# Patient Record
Sex: FEMALE | Race: BLACK OR AFRICAN AMERICAN | NOT HISPANIC OR LATINO | Employment: UNEMPLOYED | URBAN - METROPOLITAN AREA
[De-identification: names, ages, dates, MRNs, and addresses within clinical notes are randomized per-mention and may not be internally consistent; named-entity substitution may affect disease eponyms.]

---

## 2023-09-11 ENCOUNTER — TELEPHONE (OUTPATIENT)
Dept: OBGYN CLINIC | Facility: CLINIC | Age: 23
End: 2023-09-11

## 2023-09-13 ENCOUNTER — OFFICE VISIT (OUTPATIENT)
Dept: OBGYN CLINIC | Facility: CLINIC | Age: 23
End: 2023-09-13

## 2023-09-13 VITALS
HEIGHT: 62 IN | WEIGHT: 137.4 LBS | BODY MASS INDEX: 25.28 KG/M2 | HEART RATE: 86 BPM | SYSTOLIC BLOOD PRESSURE: 116 MMHG | DIASTOLIC BLOOD PRESSURE: 75 MMHG

## 2023-09-13 DIAGNOSIS — R39.9 UTI SYMPTOMS: Primary | ICD-10-CM

## 2023-09-13 LAB
SL AMB  POCT GLUCOSE, UA: NORMAL
SL AMB LEUKOCYTE ESTERASE,UA: NORMAL
SL AMB POCT BILIRUBIN,UA: NORMAL
SL AMB POCT BLOOD,UA: NORMAL
SL AMB POCT CLARITY,UA: CLEAR
SL AMB POCT COLOR,UA: YELLOW
SL AMB POCT KETONES,UA: NORMAL
SL AMB POCT NITRITE,UA: NORMAL
SL AMB POCT PH,UA: 6
SL AMB POCT SPECIFIC GRAVITY,UA: 1.02
SL AMB POCT URINE PROTEIN: NORMAL
SL AMB POCT UROBILINOGEN: 0.2

## 2023-09-13 PROCEDURE — 81002 URINALYSIS NONAUTO W/O SCOPE: CPT | Performed by: NURSE PRACTITIONER

## 2023-09-13 PROCEDURE — 99213 OFFICE O/P EST LOW 20 MIN: CPT | Performed by: NURSE PRACTITIONER

## 2023-09-13 NOTE — PROGRESS NOTES
PROBLEM GYNECOLOGICAL VISIT    Johnny Eavns is a 23 y.o. female who presents today with complaint of UTI symptoms. Her general medical history has been reviewed and she reports it as follows:    Past Medical History:   Diagnosis Date   • Asthma    • Depression      Past Surgical History:   Procedure Laterality Date   • [place]  DELIVERY ONLY N/A 2021    Procedure:  SECTION (); Surgeon: Isaias Borja MD;  Location: [facility];  Service: Obstetrics     OB History        1    Para   1    Term   0       1    AB   0    Living   1       SAB   0    IAB   0    Ectopic   0    Multiple   0    Live Births   1               Social History     Tobacco Use   • Smoking status: Never   • Smokeless tobacco: Never   Vaping Use   • Vaping Use: Never used   Substance Use Topics   • Alcohol use: Not Currently   • Drug use: Yes     Types: Marijuana     Social History     Substance and Sexual Activity   Sexual Activity Yes   • Partners: Male   • Birth control/protection: I.U.D. No current outpatient medications    History of Present Illness:   Reports she was having symptoms of urinary frequency and discomfort with urination last week but symptoms seem to have resolved spontaneously over the past 2-3 days. Review of Systems:  Review of Systems   Constitutional: Negative. Gastrointestinal: Negative. Genitourinary: Negative. Physical Exam:  /75   Pulse 86   Ht 5' 2" (1.575 m)   Wt 62.3 kg (137 lb 6.4 oz)   LMP 2023 (Exact Date)   BMI 25.13 kg/m²   Physical Exam  Constitutional:       General: She is not in acute distress. Neurological:      Mental Status: She is alert. Skin:     General: Skin is warm and dry. Vitals reviewed. Point of Care Testing:   -urine dipstick: neg leuks, neg nitrites, neg blood    Assessment:   1. Normal urinalysis. Plan:   1. Return to office as previously scheduled.

## 2023-09-13 NOTE — PATIENT INSTRUCTIONS
Thank you for your confidence in our team.   We appreciate you and welcome your feedback. If you receive a survey from us, please take a few moments to let us know how we are doing.    Sincerely,  Barron Banerjee

## 2023-09-17 ENCOUNTER — HOSPITAL ENCOUNTER (EMERGENCY)
Facility: HOSPITAL | Age: 23
Discharge: HOME | End: 2023-09-17
Attending: EMERGENCY MEDICINE
Payer: COMMERCIAL

## 2023-09-17 VITALS
DIASTOLIC BLOOD PRESSURE: 55 MMHG | TEMPERATURE: 97.9 F | SYSTOLIC BLOOD PRESSURE: 111 MMHG | HEART RATE: 81 BPM | BODY MASS INDEX: 24.68 KG/M2 | OXYGEN SATURATION: 100 % | WEIGHT: 134.92 LBS | RESPIRATION RATE: 16 BRPM

## 2023-09-17 DIAGNOSIS — N39.0 UTI (URINARY TRACT INFECTION): Primary | ICD-10-CM

## 2023-09-17 LAB
BACTERIA UR QL AUTO: ABNORMAL /HPF
BILIRUB UR QL STRIP: NEGATIVE
CLARITY UR: CLEAR
COLOR UR: YELLOW
EXT PREGNANCY TEST URINE: NEGATIVE
EXT. CONTROL: NORMAL
GLUCOSE UR STRIP-MCNC: NEGATIVE MG/DL
HGB UR QL STRIP.AUTO: ABNORMAL
KETONES UR STRIP-MCNC: NEGATIVE MG/DL
LEUKOCYTE ESTERASE UR QL STRIP: ABNORMAL
NITRITE UR QL STRIP: NEGATIVE
NON-SQ EPI CELLS URNS QL MICRO: ABNORMAL /HPF
PH UR STRIP.AUTO: 5 [PH] (ref 4.5–8)
PROT UR STRIP-MCNC: ABNORMAL MG/DL
RBC #/AREA URNS AUTO: ABNORMAL /HPF
SP GR UR STRIP.AUTO: >=1.03 (ref 1–1.03)
TRANS CELLS #/AREA URNS HPF: PRESENT /[HPF]
UROBILINOGEN UR QL STRIP.AUTO: 0.2 E.U./DL
WBC #/AREA URNS AUTO: ABNORMAL /HPF

## 2023-09-17 PROCEDURE — 81001 URINALYSIS AUTO W/SCOPE: CPT

## 2023-09-17 PROCEDURE — 87086 URINE CULTURE/COLONY COUNT: CPT

## 2023-09-17 PROCEDURE — 87591 N.GONORRHOEAE DNA AMP PROB: CPT | Performed by: PHYSICIAN ASSISTANT

## 2023-09-17 PROCEDURE — 87491 CHLMYD TRACH DNA AMP PROBE: CPT | Performed by: PHYSICIAN ASSISTANT

## 2023-09-17 PROCEDURE — 81025 URINE PREGNANCY TEST: CPT | Performed by: PHYSICIAN ASSISTANT

## 2023-09-17 PROCEDURE — 99284 EMERGENCY DEPT VISIT MOD MDM: CPT | Performed by: PHYSICIAN ASSISTANT

## 2023-09-17 PROCEDURE — 99283 EMERGENCY DEPT VISIT LOW MDM: CPT

## 2023-09-17 PROCEDURE — 87480 CANDIDA DNA DIR PROBE: CPT | Performed by: PHYSICIAN ASSISTANT

## 2023-09-17 PROCEDURE — 87510 GARDNER VAG DNA DIR PROBE: CPT | Performed by: PHYSICIAN ASSISTANT

## 2023-09-17 PROCEDURE — 87660 TRICHOMONAS VAGIN DIR PROBE: CPT | Performed by: PHYSICIAN ASSISTANT

## 2023-09-17 RX ORDER — CEPHALEXIN 500 MG/1
500 CAPSULE ORAL EVERY 12 HOURS SCHEDULED
Qty: 14 CAPSULE | Refills: 0 | Status: SHIPPED | OUTPATIENT
Start: 2023-09-17 | End: 2023-09-24

## 2023-09-17 NOTE — ED PROVIDER NOTES
History  Chief Complaint   Patient presents with   • Possible UTI     Pt reporting increased frequency, burning and pressure. Pt reports symptoms began last evening. Hx UTI. Reports was tested for UTI earlier in month and was negative but symptoms have persisted intermittently      Patient is a 22 y/o female with a PMH of asthma and depression who presents for evaluation of urinary symptoms. She states that for the last 3 weeks she's been having intermittent urinary complaints of dysuria, urgency, frequent small amounts of urination. She states that she had 1-2 episodes of a small amount of hematuria. No current hematuria. She states she will have symptoms for 3-4 days and then they will improve for a short time and then come back again. She has some suprapubic pain as well. She tried OTC azo at one point which provided some relief for a short time. Had some spotting/pink discharge once during ovulation but no other abnormal vaginal discharge/bleeding. She states she was seen by her OBGYN last week (23) and they checked her urine but she didn't have a UTI. She states they did not perform a pelvic exam or have any STD testing done at that time. She states she believes she's having symptoms because she recently became sexually active again about 3-4 weeks ago. She states her  had been incarcerated for 10 months and then got out in Aug 2023. She states no new sexual partners. No hx of kidney stones or infections. Denies fever, chills, nausea, vomiting, diarrhea, CP, SOB, back pain. LMP 23. None       Past Medical History:   Diagnosis Date   • Asthma    • Depression        Past Surgical History:   Procedure Laterality Date   • [place]  DELIVERY ONLY N/A 2021    Procedure:  SECTION ();   Surgeon: José Luis Mcfarland MD;  [facility]: [facility];  Service: Obstetrics       Family History   Problem Relation Age of Onset   • Diabetes Mother    • Fibromyalgia Mother    • Hypertension Mother    • Anemia Mother    • Hypertension Father    • Asthma Sister    • Schizophrenia Brother    • Cancer Maternal Grandmother    • Breast cancer Neg Hx    • Colon cancer Neg Hx    • Ovarian cancer Neg Hx      I have reviewed and agree with the history as documented. E-Cigarette/Vaping   • E-Cigarette Use Never User      E-Cigarette/Vaping Substances   • THC  2020     Social History     Tobacco Use   • Smoking status: Never   • Smokeless tobacco: Never   Vaping Use   • Vaping Use: Never used   Substance Use Topics   • Alcohol use: Not Currently   • Drug use: Not Currently     Types: Marijuana       Review of Systems   Constitutional: Negative for chills and fever. HENT: Negative for ear pain and sore throat. Eyes: Negative for visual disturbance. Respiratory: Negative for cough and shortness of breath. Cardiovascular: Negative for chest pain and palpitations. Gastrointestinal: Positive for abdominal pain. Negative for diarrhea, nausea and vomiting. Genitourinary: Positive for dysuria, frequency, hematuria, urgency, vaginal bleeding and vaginal pain. Negative for decreased urine volume and flank pain. Musculoskeletal: Negative for arthralgias and back pain. Skin: Negative for color change and rash. Neurological: Negative for syncope, weakness, numbness and headaches. All other systems reviewed and are negative. Physical Exam  Physical Exam  Vitals and nursing note reviewed. Exam conducted with a chaperone present. Constitutional:       General: She is not in acute distress. Appearance: Normal appearance. She is well-developed. She is not ill-appearing, toxic-appearing or diaphoretic. HENT:      Head: Normocephalic and atraumatic. Right Ear: External ear normal.      Left Ear: External ear normal.      Nose: Nose normal.   Eyes:      Conjunctiva/sclera: Conjunctivae normal.   Cardiovascular:      Rate and Rhythm: Normal rate and regular rhythm.       Heart sounds: Normal heart sounds. No murmur heard. Pulmonary:      Effort: Pulmonary effort is normal. No respiratory distress. Breath sounds: Normal breath sounds. No stridor. No wheezing, rhonchi or rales. Abdominal:      General: Abdomen is flat. Bowel sounds are normal. There is no distension. Palpations: Abdomen is soft. Tenderness: There is abdominal tenderness in the suprapubic area. There is no right CVA tenderness, left CVA tenderness or guarding. Genitourinary:     Comments: External genitalia normal without rash or lesions. Speculum exam- cervix visualized without lesions or friability. No significant vaginal discharge. No bleeding. No CMT   Musculoskeletal:         General: No swelling. Normal range of motion. Cervical back: Normal range of motion. Skin:     General: Skin is warm and dry. Capillary Refill: Capillary refill takes less than 2 seconds. Neurological:      Mental Status: She is alert. Psychiatric:         Mood and Affect: Mood normal.         Vital Signs  ED Triage Vitals [09/17/23 1509]   Temperature Pulse Respirations Blood Pressure SpO2   97.9 °F (36.6 °C) 81 16 111/55 100 %      Temp Source Heart Rate Source Patient Position - Orthostatic VS BP Location FiO2 (%)   Oral Monitor Sitting Right arm --      Pain Score       --           Vitals:    09/17/23 1509   BP: 111/55   Pulse: 81   Patient Position - Orthostatic VS: Sitting         Visual Acuity      ED Medications  Medications - No data to display    Diagnostic Studies  Results Reviewed     Procedure Component Value Units Date/Time    VAGINOSIS DNA PROBE (AFFIRM) [[id number]] Collected: 09/17/23 1541    Lab Status:  In process Specimen: Genital from Vaginal Updated: 09/17/23 1600    Urine Microscopic [[id number]]  (Abnormal) Collected: 09/17/23 1524    Lab Status: Final result Specimen: Urine, Clean Catch Updated: 09/17/23 1553     RBC, UA 20-30 /hpf      WBC, UA Innumerable /hpf      Epithelial Cells Occasional /hpf      Bacteria, UA Occasional /hpf      Transitional Epithelial Cells Present    Urine culture [[id number]] Collected: 09/17/23 1524    Lab Status: In process Specimen: Urine, Clean Catch Updated: 09/17/23 1553    Chlamydia/GC amplified DNA by PCR [[id number]] Collected: 09/17/23 1541    Lab Status: In process Specimen: Cervix Updated: 09/17/23 1548    POCT pregnancy, urine [[id number]]  (Normal) Resulted: 09/17/23 1526    Lab Status: Final result Updated: 09/17/23 1526     EXT Preg Test, Ur Negative     Control Valid    Urine Macroscopic, POC [[id number]]  (Abnormal) Collected: 09/17/23 1524    Lab Status: Final result Specimen: Urine Updated: 09/17/23 1525     Color, UA Yellow     Clarity, UA Clear     pH, UA 5.0     Leukocytes, UA Small     Nitrite, UA Negative     Protein, UA Trace mg/dl      Glucose, UA Negative mg/dl      Ketones, UA Negative mg/dl      Urobilinogen, UA 0.2 E.U./dl      Bilirubin, UA Negative     Occult Blood, UA Trace     Specific Gravity, UA >=1.030    Narrative:      CLINITEK RESULT                 No orders to display              Procedures  Procedures         ED Course         CRAFFT    Flowsheet Row Most Recent Value   CRAFFT Initial Screen: During the past 12 months, did you:    1. Drink any alcohol (more than a few sips)? No Filed at: 09/17/2023 1527   2. Smoke any marijuana or hashish No Filed at: 09/17/2023 1527   3. Use anything else to get high? ("anything else" includes illegal drugs, over the counter and prescription drugs, and things that you sniff or 'gifford')? No Filed at: 09/17/2023 1527                                          Medical Decision Making  Differential diagnosis including but not limited to: UTI, urethritis, STD, vulvovaginal candidiasis, urinary retention; doubt acute intraabdominal surgical process. Plan- will send urine preg, UA, GC/Chlamydia, and vaginosis probe  Urine preg negative. PVR only 14. No sign of retention.  No flank or CVA tenderness, no fever/chills, nausea, vomiting to indicate kidney stone or pyelonephritis. Pelvic exam unremarkable- Explained that STD testing (GC/chlamydia and vaginosis probe) will take 3-4 days and she will be contacted with positive results. If positive results, will need further treatment. UA with leukocytes, blood and protein. Micro with innumerable WBC, 20-30 rbc. Consistent with UTI. Will treat for UTI with Keflex. Discussed follow up with PCP and OBGYN in 1-2 days. Will also give contact information for urology to set up outpatient appointment if needed. Discussed strict return precautions if symptoms worsen or new symptoms arise. Patient states understanding and agrees with plan. She is well appearing, non toxic and in NAD at time of discharge. UTI (urinary tract infection): acute illness or injury  Amount and/or Complexity of Data Reviewed  Labs: ordered. Decision-making details documented in ED Course. Risk  Prescription drug management. Disposition  Final diagnoses:   UTI (urinary tract infection)     Time reflects when diagnosis was documented in both MDM as applicable and the Disposition within this note     Time User Action Codes Description Comment    9/17/2023  4:43 PM John Reyes Add [N39.0] UTI (urinary tract infection)       ED Disposition     ED Disposition   Discharge    Condition   Stable    Date/Time   Sun Sep 17, 2023  4:43 PM    Comment   Svetlana Bloodgood discharge to home/self care.                Follow-up Information     Follow up With Specialties Details Why Contact Info Additional Information    [facility] Schedule an appointment as soon as possible for a visit in 1 day  Binh Figueroa  [address]  [phone]       Follow up with your OBGYN in 1-2 days         [facility] Schedule an appointment as soon as possible for a visit  As needed Summer Hernandez [address] [place] [phone] [address] [facility], [address] [facility], [place], [place], [address]   [address] [facility] Emergency [facility] Emergency Medicine  If symptoms worsen [address] [phone]  [address] [facility] Emergency Department, [address] [facility], [place], [place], [address]          Discharge Medication List as of 9/17/2023  4:43 PM      START taking these medications    Details   cephalexin (KEFLEX) 500 mg capsule Take 1 capsule (500 mg total) by mouth every 12 (twelve) hours for 7 days, Starting Sun 9/17/2023, Until Sun 9/24/2023, Print             No discharge procedures on file.     PDMP Review     None          ED Provider  Electronically Signed by           Pooja Alvarez PA-C  09/17/23 2946

## 2023-09-18 LAB
C TRACH DNA SPEC QL NAA+PROBE: NEGATIVE
N GONORRHOEA DNA SPEC QL NAA+PROBE: NEGATIVE

## 2023-09-19 LAB — BACTERIA UR CULT: NORMAL

## 2023-09-20 LAB
CANDIDA RRNA VAG QL PROBE: NEGATIVE
G VAGINALIS RRNA GENITAL QL PROBE: NEGATIVE
T VAGINALIS RRNA GENITAL QL PROBE: NEGATIVE

## 2023-10-11 ENCOUNTER — TELEPHONE (OUTPATIENT)
Dept: FAMILY MEDICINE CLINIC | Facility: CLINIC | Age: 23
End: 2023-10-11

## 2023-10-11 NOTE — TELEPHONE ENCOUNTER
Spoke with pt who confirmed that she is no longer a pt at this practice. Pt stated that she moved out of the area and recently moved back and she has not yet met her new PCP.

## 2023-11-02 ENCOUNTER — TELEPHONE (OUTPATIENT)
Dept: PSYCHIATRY | Facility: CLINIC | Age: 23
End: 2023-11-02

## 2023-11-02 NOTE — TELEPHONE ENCOUNTER
Behavioral Health Outpatient Intake Questions    Referred By   : Self     Please advise interviewee that they need to answer all questions truthfully to allow for best care, and any misrepresentations of information may affect their ability to be seen at this clinic   => Was this discussed? Yes     If Minor Child (under age 25)    Who is/are the legal guardian(s) of the child? Is there a custody agreement? No     If "YES"- Custody orders must be obtained prior to scheduling the first appointment  In addition, Consent to Treatment must be signed by all legal guardians prior to scheduling the first appointment    If "NO"- Consent to Treatment must be signed by all legal guardians prior to scheduling the first appointment    Behavioral Health Outpatient Intake History -     Presenting Problem (in patient's own words): Having marital problems looking to get coping skills, trying to prevent marriage to fall. Are there any communication barriers for this patient? No                                               If yes, please describe barriers:   If there is a unique situation, please refer to [address] for final determination. Are you taking any psychiatric medications? No     If "YES" -What are they     If "YES" -Who prescribes? Has the Patient previously received outpatient Talk Therapy or Medication Management from [facility]  No        If "YES"- When, Where and with Whom? Rubina 11/9/23 at 2:00 pm        If "NO" -Has Patient received these services elsewhere? If "YES" -When, Where, and with Whom? Has the Patient abused alcohol or other substances in the last 6 months ? No  No concerns of substance abuse are reported. If "YES" -What substance, How much, How often? If illegal substance: Refer to [facility] (for [facility]) or [facility].    If Alcohol in excess of 10 drinks per week:  Refer to [facility] (for [facility]) or [facility] History-     Is this treatment court ordered? No   If "yes "send to : Talk Therapy : Send to [address] [place] [address] for final determination   Med Management: Send to Dr Kandy Howard for final determination     Has the Patient been convicted of a felony? No   If "Yes" send to -When, What? Talk Therapy : Send to [address] [place] [address] for final determination   Med Management: Send to Dr Kandy Howard for final determination     ACCEPTED as a patient No  If "Yes" Appointment Date:     Referred Elsewhere? No  If “Yes” - (Where? Ex: [facility], [facility], [address] [place] [address], etc.)       Name of [address] [place] [address] ID# [id number]  Insurance Phone #  If ins is primary or secondary? Primary  If patient is a minor, parents information such as Name, D. O.B of guarantor.

## 2023-11-09 ENCOUNTER — SOCIAL WORK (OUTPATIENT)
Dept: BEHAVIORAL/MENTAL HEALTH CLINIC | Facility: CLINIC | Age: 23
End: 2023-11-09
Payer: COMMERCIAL

## 2023-11-09 DIAGNOSIS — F33.40 RECURRENT MAJOR DEPRESSIVE DISORDER, IN REMISSION (HCC): ICD-10-CM

## 2023-11-09 DIAGNOSIS — F93.8: Primary | ICD-10-CM

## 2023-11-09 PROCEDURE — 90791 PSYCH DIAGNOSTIC EVALUATION: CPT | Performed by: COUNSELOR
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
## 2023-11-21 ENCOUNTER — SOCIAL WORK (OUTPATIENT)
Dept: BEHAVIORAL/MENTAL HEALTH CLINIC | Facility: CLINIC | Age: 23
End: 2023-11-21
Payer: COMMERCIAL

## 2023-11-21 DIAGNOSIS — F33.40 RECURRENT MAJOR DEPRESSIVE DISORDER, IN REMISSION (HCC): ICD-10-CM

## 2023-11-21 DIAGNOSIS — F93.8: Primary | ICD-10-CM

## 2023-11-21 PROCEDURE — 90847 FAMILY PSYTX W/PT 50 MIN: CPT | Performed by: COUNSELOR

## 2023-11-21 NOTE — BH TREATMENT PLAN
Outpatient Behavioral Health Psychotherapy Treatment Plan    Jigna Nogueira  2000     Date of Initial Psychotherapy Assessment: 11/21/23   Date of Current Treatment Plan: 11/21/23  Treatment Plan Target Date: 05/21/24  Treatment Plan Expiration Date: TBD    Diagnosis:   1. Relationship problems specific to childhood and adolescence        2. Recurrent major depressive disorder, in remission ([address] )              Area(s) of Need: They report to experience misunderstandings, lack of respect and difficulties with communication. Long Term Goal 1 (in the client's own words): Justine Ford state, "We want to be better individually and have a good marriage."    Stage of Change: Preparation    Target Date for completion: 05/21/24     Anticipated therapeutic modalities: Engagement Strategies, Client-centered Therapy, Cognitive Behavioral Therapy, Cognitive Processing Therapy, Family Therapy, Mindfulness-based Strategies, Motivational Interviewing, Music Therapy Techniques, Solution-Focused Therapy, Supportive Psychotherapy, Psycho-Education, and Couples Counseling. People identified to complete this goal: Jigna Nogueira, Kristina Johnson and Azeem Leavitt, MS, LPC, NCC      Objective 1: (identify the means of measuring success in meeting the objective): Explore relationship issues and identifying origins      Objective 2: (identify the means of measuring success in meeting the objective): Be able to express anger without yelling and using foul language    Objective 3: (identify the means of measuring success in meeting the objective): Learning and applying active listening skill       We am currently under the care of a [facility] psychiatric provider: no    My [facility] psychiatric provider is: N/A    We am currently taking psychiatric medications: No    we feel that we will be ready for discharge from mental health care when we reach the following (measurable goal/objective):  Aristeo Molina states, "When we are taking action to our goals. "    For children and adults who have a legal guardian:   Has there been any change to custody orders and/or guardianship status? NA. If yes, attach updated documentation. I have created my Crisis Plan and have been offered a copy of this plan    [address]: Diagnosis and Treatment Plan explained to Rui Olmedo acknowledges an understanding of their diagnosis. Trujillo Flavia agrees to this treatment plan.     I have been offered a copy of this Treatment Plan. yes

## 2023-11-21 NOTE — PSYCH
Behavioral Health Psychotherapy Progress Note    Psychotherapy Provided: Individual Psychotherapy     1. Relationship problems specific to childhood and adolescence        2. Recurrent major depressive disorder, in remission [facility]            Goals addressed in session: Goal 1     DATA: Nicole Oliveira and Bethel reports to have been experiencing intense arguments since we last met. Clinician and the couple identified their goals and objectives and created their treatment plan. We discussed time frames, as well as the purpose for the treatment plan. We were also able to identify areas in which they've felt their relationship needed work in. They were able to identify the lack of communication, Nicole Oliveira reports that they as a couple are able to verbalize their emotions when needed, Criselda Montano was able to verbalize however, the need to be able to listen in order to  better understand each other. We identified active listening as a goal.    During this session, this clinician used the following therapeutic modalities: Engagement Strategies, Client-centered Therapy, Cognitive Behavioral Therapy, Cognitive Processing Therapy, Family Therapy, Mindfulness-based Strategies, Motivational Interviewing, Music Therapy Techniques, Solution-Focused Therapy, Supportive Psychotherapy, and Couples Counseling. Substance Abuse was not addressed during this session. If the client is diagnosed with a co-occurring substance use disorder, please indicate any changes in the frequency or amount of use: Nicole Oliveira reports to smoke cannabis on occasion. Bethel denies substance abuse. Stage of change for addressing substance use diagnoses: Contemplation    ASSESSMENT:  Karen Guerin presents with a Euthymic/ normal mood. her affect is Normal range and intensity, which is congruent, with her mood and the content of the session. The client has made progress on their goals.     Jerome Gordon denies suicidal/homicidal ideations or self-injurious behaviors. Jorge Barbosa presents with a none risk of suicide, none risk of self-harm, and none risk of harm to others. For any risk assessment that surpasses a "low" rating, a safety plan must be developed. A safety plan was indicated: no  If yes, describe in detail safety plan not indicated. PLAN: Between sessions, Jorge Barbosa will review and discuss treatment plan. At the next session, the therapist will use Engagement Strategies, Client-centered Therapy, Cognitive Behavioral Therapy, Cognitive Processing Therapy, Mindfulness-based Strategies, Motivational Interviewing, Solution-Focused Therapy, and Supportive Psychotherapy to address their difficulties with communicating. Behavioral Health Treatment Plan and Discharge Planning: Jorge Barbosa is aware of and agrees to continue to work on their treatment plan. They have identified and are working toward their discharge goals.  yes    Visit start and stop times:    11/21/23  Start Time: 1400  Stop Time: 1453  Total Visit Time: 53 minutes

## 2023-12-06 ENCOUNTER — SOCIAL WORK (OUTPATIENT)
Dept: BEHAVIORAL/MENTAL HEALTH CLINIC | Facility: CLINIC | Age: 23
End: 2023-12-06
Payer: COMMERCIAL

## 2023-12-06 DIAGNOSIS — F33.40 RECURRENT MAJOR DEPRESSIVE DISORDER, IN REMISSION (HCC): ICD-10-CM

## 2023-12-06 DIAGNOSIS — F93.8: Primary | ICD-10-CM

## 2023-12-06 PROCEDURE — 90834 PSYTX W PT 45 MINUTES: CPT | Performed by: COUNSELOR

## 2023-12-06 NOTE — PSYCH
Behavioral Health Psychotherapy Progress Note    Psychotherapy Provided: Individual Psychotherapy     1. Relationship problems specific to childhood and adolescence        2. Recurrent major depressive disorder, in remission [facility]            Goals addressed in session: Goal 1     DATA: Today clinician met with Carin Stockton on her own. Clinician took this moment to have an individual session to further discuss the difficulties she's had within the relationship. Carin Stockton shared she and her partner had just gotten into an argument and he didn't want to attend the session. Clinician discussed how this recent interaction could have been a great opportunity to further discussed and identify their barriers, as per perspective. We discussed communication skills; as she reports they lack empathy for each other at times. She shares feeling unheard and misunderstood. Carin Stockton indicates of feeling numb, due to how much her partner feels he needs to win the argument without understanding her. We explored pointers that will help with improving her communication with her partner. We discussed utilizing "I" statements, utilizing respect, and learning when to approach. Clinician and Carin Stockton also completed her Crisis Plan. During this session, this clinician used the following therapeutic modalities: Engagement Strategies, Mindfulness-based Strategies, Motivational Interviewing, Solution-Focused Therapy, and Supportive Psychotherapy    Substance Abuse was not addressed during this session. If the client is diagnosed with a co-occurring substance use disorder, please indicate any changes in the frequency or amount of use: Carin Stockton denies substance abuse. Stage of change for addressing substance use diagnoses: No substance use/Not applicable    ASSESSMENT:  Yesy Clark presents with a Euthymic/ normal mood. her affect is Normal range and intensity, which is congruent, with her mood and the content of the session.  The client has not made progress on their goals. Nicole Oliveira denies suicidal/homicidal ideations or self-injurious behaviors. Karen Guerin presents with a none risk of suicide, none risk of self-harm, and none risk of harm to others. For any risk assessment that surpasses a "low" rating, a safety plan must be developed. A safety plan was indicated: no  If yes, describe in detail safety plan not indicated. PLAN: Between sessions, Karen Guerin will review the "Soft Start" communication skills handout. At the next session, the therapist will use Engagement Strategies, Client-centered Therapy, Cognitive Behavioral Therapy, Cognitive Processing Therapy, Mindfulness-based Strategies, Motivational Interviewing, Solution-Focused Therapy, Supportive Psychotherapy, and couples counseling. to address marital issues. Behavioral Health Treatment Plan and Discharge Planning: Karen Guerin is aware of and agrees to continue to work on their treatment plan. They have identified and are working toward their discharge goals.  yes    Visit start and stop times:    12/06/23  Start Time: 1400  Stop Time: 1452  Total Visit Time: 52 minutes

## 2023-12-06 NOTE — BH CRISIS PLAN
Client Name: Vani Pro       Client YOB: 2000  : 2000    Treatment Team (include name and contact information):     Psychotherapist: Reginaldo Branham MS, Eliezer PANDEY    Psychiatrist: N/A   Release of information completed: no    : N/A   Release of information completed: no    Other (Specify Role): N/A    Release of information completed: no    Other (Specify Role): N/A   Release of information completed: no    Healthcare Provider  No primary care provider on file. No primary provider on file. Type of Plan   * Child plans (children 15 yo and younger) must be completed and signed by the child's legal guardian   * Plans for all individuals 15 yo and above must be signed by the client. Plan Type: adolescent/adult (14 and over) Initial      My Personal Strengths are (in the client's own words): Rebecca Balbuena states, "I am very caring and I try to understands others perspectives; even when I'm wrong. I am a problem solver, I will always try to find a solution. I am a hard worker. "    The stressors and triggers that may put me at risk are:  My  can be a stressor at time, family conflict, family issues, family problems, financial problems, job stress, relationship problems, and stress at work    Coping skills I can use to keep myself calm and safe: Take a shower, Listen to music, Call a friend or family member, Rena Stalling and [place]/meditate    Coping skills/supports I can use to maintain abstinence from substance use:   Not Applicable    The people that provide me with help and support: (Include name, contact, and how they can help)   Support person #1:Jasmeet - Dad    * Phone number: in cell phone    * How can they help me? To talk about random things    Support person #2: Tali Amaya - Mother-in-Law    * Phone number: in cell phone    * How can they help me? Just talk about things at times.      Support person #3: Mila Morton - Sister in [facility]    * Phone number: in cell phone    * How can they help me? She's a great distraction to talk about other things in life. In the past, the following has helped me in times of crisis:    Being in a quiet space, Being with other people, Calling a friend, Calling a family member, Praying or meditating, Listening to music, Watching television or a movie, and Other: cooking      If it is an emergency and you need immediate help, call     If there is a possibility of danger to yourself or others, call the following crisis hotline resources:     Adult Crisis Numbers  Suicide Prevention Hotline - Dial   [facility]: [address]: [address]: [address]: [address]: [address]: [address]: [address]: [phone] (daytime). [phone] (after hours, weekends, holidays)     Child/Adolescent Crisis Numbers   [facility]: [address]: [phone]   [facility]: [phone]   [facility]: [phone]    Please note: Some [facility] do not have a separate number for Child/Adolescent specific crisis. If your county is not listed under Child/Adolescent, please call the adult number for your county     National Talk to Text Line   All Ages - [phone]    In the event your feelings become unmanageable, and you cannot reach your support system, you will call 911 immediately or go to the nearest hospital emergency room.

## 2023-12-28 ENCOUNTER — SOCIAL WORK (OUTPATIENT)
Dept: BEHAVIORAL/MENTAL HEALTH CLINIC | Facility: CLINIC | Age: 23
End: 2023-12-28
Payer: COMMERCIAL

## 2023-12-28 DIAGNOSIS — F33.40 RECURRENT MAJOR DEPRESSIVE DISORDER, IN REMISSION (HCC): ICD-10-CM

## 2023-12-28 DIAGNOSIS — F93.8: Primary | ICD-10-CM

## 2023-12-28 PROCEDURE — 90834 PSYTX W PT 45 MINUTES: CPT | Performed by: COUNSELOR

## 2023-12-28 NOTE — PSYCH
Behavioral Health Psychotherapy Progress Note    Psychotherapy Provided: Individual Psychotherapy     1. Relationship problems specific to childhood and adolescence        2. Recurrent major depressive disorder, in remission ([facility])            Goals addressed in session: Goal 1     DATA: Today we discussed how Xavier have been experiencing a cycle of arguments. Clinician provided them with time and space to verbalize how they've been going through their cycle. Clinician validated both sides, and highlighted possible alternative to their perspectives. Clinician explored and identified certain barriers they've had in their engagement, and discussed two  forms in which each can invest in their marriage. We explored what love was after they quoted the bible. Clinician read the verse that corresponded to what they mentioned and we further explored what that would look like within their marriage.   During this session, this clinician used the following therapeutic modalities: Engagement Strategies, Client-centered Therapy, Cognitive Behavioral Therapy, Cognitive Processing Therapy, Mindfulness-based Strategies, Motivational Interviewing, Solution-Focused Therapy, Supportive Psychotherapy, and Biblical Counseling    Substance Abuse was not addressed during this session. If the client is diagnosed with a co-occurring substance use disorder, please indicate any changes in the frequency or amount of use: Kamilah denies substance abuse. Stage of change for addressing substance use diagnoses: No substance use/Not applicable     ASSESSMENT:  Kamilah Ortega presents with a Euthymic/ normal mood.      her affect is Normal range and intensity, which is congruent, with her mood and the content of the session. The client has not made progress on their goals.     Kamilah denies suicidal/homicidal ideations or self-injurious behaviors.  Kamilah Ortega presents with a none risk of suicide, none risk of self-harm, and none  "risk of harm to others.     For any risk assessment that surpasses a \"low\" rating, a safety plan must be developed.     A safety plan was indicated: no  If yes, describe in detail safety plan not indicated.    PLAN: Between sessions, Kamilah Ortega and Bethel Ortega will search for what the bible says about marriage. At the next session, the therapist will use Engagement Strategies, Client-centered Therapy, Cognitive Behavioral Therapy, Cognitive Processing Therapy, Mindfulness-based Strategies, Motivational Interviewing, Solution-Focused Therapy, Supportive Psychotherapy, and Biblical Counseling  to address their anger.    Behavioral Health Treatment Plan and Discharge Planning: Kamilah Jordan is aware of and agrees to continue to work on their treatment plan. They have identified and are working toward their discharge goals. yes    Visit start and stop times:    12/28/23  Start Time: 1401  Stop Time: 1453  Total Visit Time: 52 minutes  "

## 2024-01-02 ENCOUNTER — HOSPITAL ENCOUNTER (EMERGENCY)
Facility: HOSPITAL | Age: 24
Discharge: HOME | End: 2024-01-02
Attending: EMERGENCY MEDICINE
Payer: COMMERCIAL

## 2024-01-02 ENCOUNTER — APPOINTMENT (EMERGENCY)
Dept: ULTRASOUND IMAGING | Facility: HOSPITAL | Age: 24
End: 2024-01-02
Payer: COMMERCIAL

## 2024-01-02 VITALS
WEIGHT: 133.16 LBS | BODY MASS INDEX: 24.35 KG/M2 | OXYGEN SATURATION: 100 % | RESPIRATION RATE: 16 BRPM | DIASTOLIC BLOOD PRESSURE: 55 MMHG | SYSTOLIC BLOOD PRESSURE: 127 MMHG | HEART RATE: 88 BPM | TEMPERATURE: 97.5 F

## 2024-01-02 DIAGNOSIS — S16.1XXA NECK STRAIN, INITIAL ENCOUNTER: ICD-10-CM

## 2024-01-02 DIAGNOSIS — O21.9 NAUSEA AND VOMITING DURING PREGNANCY: ICD-10-CM

## 2024-01-02 DIAGNOSIS — Z3A.01 5 WEEKS GESTATION OF PREGNANCY: Primary | ICD-10-CM

## 2024-01-02 LAB
ABO GROUP BLD: NORMAL
ALBUMIN SERPL BCP-MCNC: 4.1 G/DL (ref 3.5–5)
ALP SERPL-CCNC: 35 U/L (ref 34–104)
ALT SERPL W P-5'-P-CCNC: 11 U/L (ref 7–52)
ANION GAP SERPL CALCULATED.3IONS-SCNC: 4 MMOL/L
AST SERPL W P-5'-P-CCNC: 13 U/L (ref 13–39)
B-HCG SERPL-ACNC: ABNORMAL MIU/ML (ref 0–5)
BACTERIA UR QL AUTO: ABNORMAL /HPF
BASOPHILS # BLD AUTO: 0.02 THOUSANDS/ÂΜL (ref 0–0.1)
BASOPHILS NFR BLD AUTO: 0 % (ref 0–1)
BILIRUB SERPL-MCNC: 1.21 MG/DL (ref 0.2–1)
BILIRUB UR QL STRIP: NEGATIVE
BUN SERPL-MCNC: 11 MG/DL (ref 5–25)
CALCIUM SERPL-MCNC: 9 MG/DL (ref 8.4–10.2)
CHLORIDE SERPL-SCNC: 105 MMOL/L (ref 96–108)
CLARITY UR: CLEAR
CO2 SERPL-SCNC: 24 MMOL/L (ref 21–32)
COLOR UR: YELLOW
CREAT SERPL-MCNC: 0.69 MG/DL (ref 0.6–1.3)
EOSINOPHIL # BLD AUTO: 0.32 THOUSAND/ÂΜL (ref 0–0.61)
EOSINOPHIL NFR BLD AUTO: 7 % (ref 0–6)
ERYTHROCYTE [DISTWIDTH] IN BLOOD BY AUTOMATED COUNT: 13.1 % (ref 11.6–15.1)
EXT PREGNANCY TEST URINE: POSITIVE
EXT. CONTROL: ABNORMAL
FLUAV RNA RESP QL NAA+PROBE: NEGATIVE
FLUBV RNA RESP QL NAA+PROBE: NEGATIVE
GFR SERPL CREATININE-BSD FRML MDRD: 124 ML/MIN/1.73SQ M
GLUCOSE SERPL-MCNC: 78 MG/DL (ref 65–140)
GLUCOSE UR STRIP-MCNC: NEGATIVE MG/DL
HCT VFR BLD AUTO: 35.8 % (ref 34.8–46.1)
HGB BLD-MCNC: 11.4 G/DL (ref 11.5–15.4)
HGB UR QL STRIP.AUTO: NEGATIVE
IMM GRANULOCYTES # BLD AUTO: 0.01 THOUSAND/UL (ref 0–0.2)
IMM GRANULOCYTES NFR BLD AUTO: 0 % (ref 0–2)
KETONES UR STRIP-MCNC: NEGATIVE MG/DL
LEUKOCYTE ESTERASE UR QL STRIP: NEGATIVE
LYMPHOCYTES # BLD AUTO: 1.71 THOUSANDS/ÂΜL (ref 0.6–4.47)
LYMPHOCYTES NFR BLD AUTO: 35 % (ref 14–44)
MCH RBC QN AUTO: 27.3 PG (ref 26.8–34.3)
MCHC RBC AUTO-ENTMCNC: 31.8 G/DL (ref 31.4–37.4)
MCV RBC AUTO: 86 FL (ref 82–98)
MONOCYTES # BLD AUTO: 0.4 THOUSAND/ÂΜL (ref 0.17–1.22)
MONOCYTES NFR BLD AUTO: 8 % (ref 4–12)
MUCOUS THREADS UR QL AUTO: ABNORMAL
NEUTROPHILS # BLD AUTO: 2.47 THOUSANDS/ÂΜL (ref 1.85–7.62)
NEUTS SEG NFR BLD AUTO: 50 % (ref 43–75)
NITRITE UR QL STRIP: NEGATIVE
NON-SQ EPI CELLS URNS QL MICRO: ABNORMAL /HPF
NRBC BLD AUTO-RTO: 0 /100 WBCS
PH UR STRIP.AUTO: 7.5 [PH] (ref 4.5–8)
PLATELET # BLD AUTO: 167 THOUSANDS/UL (ref 149–390)
PMV BLD AUTO: 11.1 FL (ref 8.9–12.7)
POTASSIUM SERPL-SCNC: 3.7 MMOL/L (ref 3.5–5.3)
PROT SERPL-MCNC: 7 G/DL (ref 6.4–8.4)
PROT UR STRIP-MCNC: ABNORMAL MG/DL
RBC # BLD AUTO: 4.17 MILLION/UL (ref 3.81–5.12)
RBC #/AREA URNS AUTO: ABNORMAL /HPF
RH BLD: POSITIVE
RSV RNA RESP QL NAA+PROBE: NEGATIVE
SARS-COV-2 RNA RESP QL NAA+PROBE: NEGATIVE
SODIUM SERPL-SCNC: 133 MMOL/L (ref 135–147)
SP GR UR STRIP.AUTO: 1.02 (ref 1–1.03)
UROBILINOGEN UR QL STRIP.AUTO: 0.2 E.U./DL
WBC # BLD AUTO: 4.93 THOUSAND/UL (ref 4.31–10.16)
WBC #/AREA URNS AUTO: ABNORMAL /HPF

## 2024-01-02 PROCEDURE — 85025 COMPLETE CBC W/AUTO DIFF WBC: CPT | Performed by: EMERGENCY MEDICINE

## 2024-01-02 PROCEDURE — 96361 HYDRATE IV INFUSION ADD-ON: CPT

## 2024-01-02 PROCEDURE — 0241U HB NFCT DS VIR RESP RNA 4 TRGT: CPT

## 2024-01-02 PROCEDURE — 84702 CHORIONIC GONADOTROPIN TEST: CPT | Performed by: EMERGENCY MEDICINE

## 2024-01-02 PROCEDURE — 81001 URINALYSIS AUTO W/SCOPE: CPT

## 2024-01-02 PROCEDURE — 81025 URINE PREGNANCY TEST: CPT | Performed by: EMERGENCY MEDICINE

## 2024-01-02 PROCEDURE — 99284 EMERGENCY DEPT VISIT MOD MDM: CPT

## 2024-01-02 PROCEDURE — 86901 BLOOD TYPING SEROLOGIC RH(D): CPT | Performed by: EMERGENCY MEDICINE

## 2024-01-02 PROCEDURE — 36415 COLL VENOUS BLD VENIPUNCTURE: CPT

## 2024-01-02 PROCEDURE — 76801 OB US < 14 WKS SINGLE FETUS: CPT

## 2024-01-02 PROCEDURE — 99285 EMERGENCY DEPT VISIT HI MDM: CPT | Performed by: EMERGENCY MEDICINE

## 2024-01-02 PROCEDURE — 96374 THER/PROPH/DIAG INJ IV PUSH: CPT

## 2024-01-02 PROCEDURE — 80053 COMPREHEN METABOLIC PANEL: CPT | Performed by: EMERGENCY MEDICINE

## 2024-01-02 PROCEDURE — 86900 BLOOD TYPING SEROLOGIC ABO: CPT | Performed by: EMERGENCY MEDICINE

## 2024-01-02 RX ORDER — ONDANSETRON 4 MG/1
4 TABLET, FILM COATED ORAL EVERY 6 HOURS
Qty: 60 TABLET | Refills: 0 | Status: SHIPPED | OUTPATIENT
Start: 2024-01-02 | End: 2024-01-15

## 2024-01-02 RX ORDER — ONDANSETRON 2 MG/ML
4 INJECTION INTRAMUSCULAR; INTRAVENOUS ONCE
Status: COMPLETED | OUTPATIENT
Start: 2024-01-02 | End: 2024-01-02

## 2024-01-02 RX ORDER — LIDOCAINE 50 MG/G
1 PATCH TOPICAL DAILY
Qty: 7 PATCH | Refills: 0 | Status: SHIPPED | OUTPATIENT
Start: 2024-01-02 | End: 2024-01-15

## 2024-01-02 RX ORDER — LIDOCAINE 50 MG/G
1 PATCH TOPICAL ONCE
Status: DISCONTINUED | OUTPATIENT
Start: 2024-01-02 | End: 2024-01-02 | Stop reason: HOSPADM

## 2024-01-02 RX ADMIN — LIDOCAINE 1 PATCH: 700 PATCH TOPICAL at 08:57

## 2024-01-02 RX ADMIN — ONDANSETRON 4 MG: 2 INJECTION INTRAMUSCULAR; INTRAVENOUS at 08:56

## 2024-01-02 RX ADMIN — SODIUM CHLORIDE 1000 ML: 0.9 INJECTION, SOLUTION INTRAVENOUS at 07:31

## 2024-01-02 NOTE — DISCHARGE INSTRUCTIONS
Try to remain hydrated as best as possible. Electrolyte supplementation is also important if solid intake is reduced - liquid IV and pedialyte are great options.  Use tylenol, heat application, massage, stretching, lidoderm patches for neck pain relief as needed.  Use zofran as prescribed as needed for nausea.  Follow-up as scheduled with OB 1/15.  Return to ER if increased bleeding, lightheadedness, loss of consciousness, inability to drink liquids, intractable nausea/vomiting.

## 2024-01-02 NOTE — Clinical Note
Kamilah Ortega was seen and treated in our emergency department on 1/2/2024.    No restrictions            Diagnosis: normal pregnancy with nausea/vomiting, neck strain    Kamilah  may return to work on return date.    She may return on this date: 01/03/2024         If you have any questions or concerns, please don't hesitate to call.      Melanie Powers PA-C    ______________________________           _______________          _______________  Hospital Representative                              Date                                Time

## 2024-01-02 NOTE — ED PROVIDER NOTES
"History  Chief Complaint   Patient presents with    Fall     Pt reports a fall 4 days ago down a flight of steps, hit posterior head. Denies thinners. Pt reports neck pain since the fall.    Vaginal Bleeding - Pregnant     Pt reports currently 5 weeks pregnant, first OB appt 1/15. Pt had one episode yesterday where she wiped and noticed a streak of \"black blood that looked like mucous.\" Pt also concerned because when she has morning sickness, she feels lightheaded and has \"white specks\" in her vision.      Kamilah is a 23-year-old  female 5 weeks pregnant with past medical history of asthma presenting to the emergency department today with complaint of vaginal bleeding and neck pain 2 days status post fall down 4 steps.  The patient reports a mechanical fall down 4 steps 2 days ago involving a head strike, and denies loss of consciousness and use of blood thinners.  Patient has been taking Tylenol with no relief of the neck pain.  Patient additionally reports noticing \"black mucus\" vaginal discharge yesterday, and has had intermittent pelvic cramping during the past couple weeks.  Patient is scheduled to have her first OB appointment on January 15, and reports not yet having an ultrasound to confirm an intrauterine pregnancy.  Patient reports having significant nausea and vomiting, associated with some lightheadedness and white spots in her vision after vomiting episodes and significant physical activity.  Patient denies chest pain, shortness of breath, palpitations, syncopal episodes, bloody emesis, dysuria, unilateral weakness, unilateral leg swelling.  Patient had an uncomplicated  delivery with her first child, who is healthy and well.  Patient does not know her blood type.  Last menstrual period was 5 weeks ago.  Patient has no known drug allergies, which she is to food products including fish soy and peanut.  Patient denies alcohol consumption, tobacco use, vaping, and drug " "use.      Fall  Associated symptoms: abdominal pain (Pelvic cramping, intermittent), headaches, nausea, neck pain and vomiting    Associated symptoms: no back pain and no chest pain        None       Past Medical History:   Diagnosis Date    Asthma     Depression        Past Surgical History:   Procedure Laterality Date    DE  DELIVERY ONLY N/A 2021    Procedure:  SECTION ();  Surgeon: Sari SAPP MD;  Location: [facility];  Service: Obstetrics       Family History   Problem Relation Age of Onset    Diabetes Mother     Fibromyalgia Mother     Hypertension Mother     Anemia Mother     Hypertension Father     Asthma Sister     Schizophrenia Brother     Cancer Maternal Grandmother     Breast cancer Neg Hx     Colon cancer Neg Hx     Ovarian cancer Neg Hx      I have reviewed and agree with the history as documented.    E-Cigarette/Vaping    E-Cigarette Use Never User      E-Cigarette/Vaping Substances    Nicotine No     THC No 2020    CBD No     Flavoring No     Other No     Unknown No      Social History     Tobacco Use    Smoking status: Never    Smokeless tobacco: Never   Vaping Use    Vaping status: Never Used   Substance Use Topics    Alcohol use: Not Currently    Drug use: Not Currently     Types: Marijuana       Review of Systems   Constitutional:  Positive for fatigue. Negative for activity change, appetite change, chills, diaphoresis and fever.   HENT:  Positive for congestion and rhinorrhea. Negative for trouble swallowing.    Eyes:  Positive for visual disturbance (\"White spots in my vision after I vomit\").   Respiratory:  Negative for chest tightness and shortness of breath.    Cardiovascular:  Negative for chest pain and palpitations.   Gastrointestinal:  Positive for abdominal pain (Pelvic cramping, intermittent), nausea and vomiting. Negative for blood in stool, constipation and diarrhea.   Genitourinary:  Positive for frequency and vaginal bleeding. Negative for " difficulty urinating, dysuria, urgency and vaginal pain.   Musculoskeletal:  Positive for neck pain and neck stiffness. Negative for back pain.   Neurological:  Positive for light-headedness and headaches. Negative for dizziness, syncope, speech difficulty, weakness and numbness.   Psychiatric/Behavioral:  Negative for confusion.        Physical Exam  Physical Exam  Constitutional:       General: She is not in acute distress.     Appearance: Normal appearance. She is normal weight. She is not ill-appearing, toxic-appearing or diaphoretic.   HENT:      Head: Normocephalic and atraumatic.      Nose: Nose normal.      Mouth/Throat:      Mouth: Mucous membranes are moist.   Eyes:      Extraocular Movements: Extraocular movements intact.      Conjunctiva/sclera: Conjunctivae normal.      Pupils: Pupils are equal, round, and reactive to light.   Cardiovascular:      Rate and Rhythm: Normal rate and regular rhythm.      Pulses: Normal pulses.      Heart sounds: Normal heart sounds. No murmur heard.     No friction rub. No gallop.   Pulmonary:      Effort: Pulmonary effort is normal.      Breath sounds: Normal breath sounds. No wheezing, rhonchi or rales.   Abdominal:      General: Abdomen is flat. Bowel sounds are normal.      Palpations: Abdomen is soft.      Tenderness: There is no abdominal tenderness. There is no guarding or rebound.   Musculoskeletal:         General: No swelling or tenderness.      Cervical back: Muscular tenderness (Bilateral trapezius) present. Normal range of motion.      Right lower leg: No edema.      Left lower leg: No edema.      Comments: No midline tenderness down cervical thoracic lumbar sacral spine.  Tenderness of the trapezius muscles bilaterally.     Skin:     General: Skin is warm and dry.      Coloration: Skin is not pale.   Neurological:      General: No focal deficit present.      Mental Status: She is alert and oriented to person, place, and time.      Cranial Nerves: No cranial  nerve deficit (CN II-XII intact).      Sensory: No sensory deficit.      Motor: No weakness.      Comments: Strength 5/5 upper and lower extremities bilaterally.  Sensation to light touch intact upper and lower extremities bilaterally.         Vital Signs  ED Triage Vitals [01/02/24 0556]   Temperature Pulse Respirations Blood Pressure SpO2   97.5 °F (36.4 °C) 88 16 127/55 100 %      Temp Source Heart Rate Source Patient Position - Orthostatic VS BP Location FiO2 (%)   Oral Monitor Sitting Right arm --      Pain Score       --           Vitals:    01/02/24 0556   BP: 127/55   Pulse: 88   Patient Position - Orthostatic VS: Sitting         Visual Acuity  Visual Acuity      Flowsheet Row Most Recent Value   L Pupil Size (mm) 3   R Pupil Size (mm) 3            ED Medications  Medications   lidocaine (LIDODERM) 5 % patch 1 patch (1 patch Topical Medication Applied 1/2/24 0857)   sodium chloride 0.9 % bolus 1,000 mL (0 mL Intravenous Stopped 1/2/24 0856)   ondansetron (ZOFRAN) injection 4 mg (4 mg Intravenous Given 1/2/24 0856)       Diagnostic Studies  Results Reviewed       Procedure Component Value Units Date/Time    hCG, quantitative [[id number]]  (Abnormal) Collected: 01/02/24 0731    Lab Status: Final result Specimen: Blood from Arm, Right Updated: 01/02/24 1026     HCG, Quant 13,137 mIU/mL     Narrative:       Expected Ranges:    HCG results between 5 and 25 mIU/mL may be indicative of early pregnancy but should be interpreted in light of the total clinical presentation.    HCG can rise to detectable levels in jj and post menopausal women (0-11.6 mIU/mL).     Approximate               Approximate HCG  Gestation age          Concentration ( mIU/mL)  _____________          ______________________   Weeks                      HCG values  0.2-1                       5-50  1-2                           2-3                         100-5000  3-4                         500-93773  4-5                          1000-43413  5-6                         [id number]  6-8                         [id number]  8-12                        [id number]      FLU/RSV/COVID - if FLU/RSV clinically relevant [[id number]]  (Normal) Collected: 01/02/24 0854    Lab Status: Final result Specimen: Nares from Nasopharyngeal Swab Updated: 01/02/24 0945     SARS-CoV-2 Negative     INFLUENZA A PCR Negative     INFLUENZA B PCR Negative     RSV PCR Negative    Narrative:      FOR PEDIATRIC PATIENTS - copy/paste COVID Guidelines URL to browser: [email or web address]    SARS-CoV-2 assay is a Nucleic Acid Amplification assay intended for the  qualitative detection of nucleic acid from SARS-CoV-2 in nasopharyngeal  swabs. Results are for the presumptive identification of SARS-CoV-2 RNA.    Positive results are indicative of infection with SARS-CoV-2, the virus  causing COVID-19, but do not rule out bacterial infection or co-infection  with other viruses. Laboratories within the [place] and its  territories are required to report all positive results to the appropriate  public health authorities. Negative results do not preclude SARS-CoV-2  infection and should not be used as the sole basis for treatment or other  patient management decisions. Negative results must be combined with  clinical observations, patient history, and epidemiological information.  This test has not been FDA cleared or approved.    This test has been authorized by FDA under an Emergency Use Authorization  (EUA). This test is only authorized for the duration of time the  declaration that circumstances exist justifying the authorization of the  emergency use of an in vitro diagnostic tests for detection of SARS-CoV-2  virus and/or diagnosis of COVID-19 infection under section 564(b)(1) of  the Act, 21 U.S.C. 360bbb-3(b)(1), unless the authorization is terminated  or revoked sooner. The test has been validated but  independent review by FDA  and CLIA is pending.    Test performed using [employer] GeneXpert: This RT-PCR assay targets N2,  a region unique to SARS-CoV-2. A conserved region in the E-gene was chosen  for pan-Sarbecovirus detection which includes SARS-CoV-2.    According to CMS-2020-01-R, this platform meets the definition of high-throughput technology.    Urine Microscopic [[id number]]  (Abnormal) Collected: 01/02/24 0846    Lab Status: Final result Specimen: Urine, Clean Catch Updated: 01/02/24 0908     RBC, UA 1-2 /hpf      WBC, UA 1-2 /hpf      Epithelial Cells Occasional /hpf      Bacteria, UA Occasional /hpf      MUCUS THREADS Moderate    POCT pregnancy, urine [[id number]]  (Abnormal) Resulted: 01/02/24 0859    Lab Status: Final result Specimen: Urine Updated: 01/02/24 0859     EXT Preg Test, Ur Positive     Control Valid    Urine Macroscopic, POC [[id number]]  (Abnormal) Collected: 01/02/24 0846    Lab Status: Final result Specimen: Urine Updated: 01/02/24 0847     Color, UA Yellow     Clarity, UA Clear     pH, UA 7.5     Leukocytes, UA Negative     Nitrite, UA Negative     Protein, UA 30 (1+) mg/dl      Glucose, UA Negative mg/dl      Ketones, UA Negative mg/dl      Urobilinogen, UA 0.2 E.U./dl      Bilirubin, UA Negative     Occult Blood, UA Negative     Specific Gravity, UA 1.025    Narrative:      CLINITEK RESULT    Comprehensive metabolic panel [[id number]]  (Abnormal) Collected: 01/02/24 0731    Lab Status: Final result Specimen: Blood from Arm, Right Updated: 01/02/24 0801     Sodium 133 mmol/L      Potassium 3.7 mmol/L      Chloride 105 mmol/L      CO2 24 mmol/L      ANION GAP 4 mmol/L      BUN 11 mg/dL      Creatinine 0.69 mg/dL      Glucose 78 mg/dL      Calcium 9.0 mg/dL      AST 13 U/L      ALT 11 U/L      Alkaline Phosphatase 35 U/L      Total Protein 7.0 g/dL      Albumin 4.1 g/dL      Total Bilirubin 1.21 mg/dL      eGFR 124 ml/min/1.73sq m     Narrative:      National Kidney Disease Foundation  guidelines for Chronic Kidney Disease (CKD):     Stage 1 with normal or high GFR (GFR > 90 mL/min/1.73 square meters)    Stage 2 Mild CKD (GFR = 60-89 mL/min/1.73 square meters)    Stage 3A Moderate CKD (GFR = 45-59 mL/min/1.73 square meters)    Stage 3B Moderate CKD (GFR = 30-44 mL/min/1.73 square meters)    Stage 4 Severe CKD (GFR = 15-29 mL/min/1.73 square meters)    Stage 5 End Stage CKD (GFR <15 mL/min/1.73 square meters)  Note: GFR calculation is accurate only with a steady state creatinine    CBC and differential [[id number]]  (Abnormal) Collected: 01/02/24 0731    Lab Status: Final result Specimen: Blood from Arm, Right Updated: 01/02/24 0746     WBC 4.93 Thousand/uL      RBC 4.17 Million/uL      Hemoglobin 11.4 g/dL      Hematocrit 35.8 %      MCV 86 fL      MCH 27.3 pg      MCHC 31.8 g/dL      RDW 13.1 %      MPV 11.1 fL      Platelets 167 Thousands/uL      nRBC 0 /100 WBCs      Neutrophils Relative 50 %      Immat GRANS % 0 %      Lymphocytes Relative 35 %      Monocytes Relative 8 %      Eosinophils Relative 7 %      Basophils Relative 0 %      Neutrophils Absolute 2.47 Thousands/µL      Immature Grans Absolute 0.01 Thousand/uL      Lymphocytes Absolute 1.71 Thousands/µL      Monocytes Absolute 0.40 Thousand/µL      Eosinophils Absolute 0.32 Thousand/µL      Basophils Absolute 0.02 Thousands/µL                    US OB < 14 weeks with transvaginal   Final Result by Juraez Laws MD (01/02 1036)      Intrauterine gestational sac corresponding to a 5-week 1 day pregnancy with yolk sac present. Fetal pole not currently identified. Follow-up recommended.      Workstation performed: [id number]                  ED Course  ED Course as of 01/02/24 1112   Tue Jan 02, 2024   0845 ABO/Rh   1019 Patient is O+. Will not need to provide rhogam.   1044 IUP confirmed on US, estimated 5 weeks 1 day, cervix closed, no free fluid present.        SBIRT 20yo+      Flowsheet Row Most Recent Value   Initial Alcohol  Screen: US AUDIT-C     1. How often do you have a drink containing alcohol? 0 Filed at: 2024   2. How many drinks containing alcohol do you have on a typical day you are drinking?  0 Filed at: 2024   3b. FEMALE Any Age, or MALE 65+: How often do you have 4 or more drinks on one occassion? 0 Filed at: 2024   Audit-C Score 0 Filed at: 2024   TIFFANIE: How many times in the past year have you...    Used an illegal drug or used a prescription medication for non-medical reasons? Never Filed at: 2024            Medical Decision Making  INITIAL EVALUATION: Patient presents alert and coherent, NAD, VS normal, 2 days status post fall down 4 steps.  + Head strike, - loss of consciousness, - blood thinners.  Neuroexam normal. No midline spine tenderness. No physical exam findings suggestive of significant trauma.  Bilateral trapezius muscle tightness and tenderness.  Additional complaints of vaginal bleeding and significant nausea/vomiting, 5 weeks since last menstrual period, no ultrasound yet at this point, unknown blood type.  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  Nursing placed orders for urinalysis dip, CBC, BMP, and IV NS 1000cc bolus.  Differential diagnosis includes ectopic pregnancy, molar pregnancy, normal pregnancy nausea/vomiting, Rh- status, spontaneous . Will proceed with workup to include quantitative hCG, transvaginal ultrasound, ABO Rh, COVID/flu/RSV.  Will provide a Lidoderm patch and Zofran for symptomatic relief.    CLINICAL SUMMARY: Ultrasound confirmed intrauterine pregnancy, cervix closed, no free fluid in pelvis, estimated gestational age 5 weeks and 1 day. Confirmed patient blood type O+, RhoGAM not indicated.  Patient felt significant nausea relief after Zofran administration.  BMP and CBC unremarkable.  Viral panel negative.  Beta hCG level normal for gestational age.  Patient has been hemodynamically stable during ER  visit.  Patient safe to discharge home with OB follow-up as scheduled on January 15.  Patient discharged home in stable condition, with prescriptions for Zofran and Lidoderm patch.  An informed of indications to return to the emergency department.    Amount and/or Complexity of Data Reviewed  Labs: ordered. Decision-making details documented in ED Course.  Radiology: ordered.    Risk  Prescription drug management.           Disposition  Final diagnoses:   5 weeks gestation of pregnancy   Neck strain, initial encounter   Nausea and vomiting during pregnancy     Time reflects when diagnosis was documented in both MDM as applicable and the Disposition within this note       Time User Action Codes Description Comment    1/2/2024 10:50 AM Melanie Powers [Z3A.01] 5 weeks gestation of pregnancy     1/2/2024 10:56 AM Melanie Powers [S16.1XXA] Neck strain, initial encounter     1/2/2024 10:59 AM Melanie Powers [O21.9] Nausea and vomiting during pregnancy           ED Disposition       ED Disposition   Discharge    Condition   Stable    Date/Time   Tue Jan 2, 2024 1050    Comment   Kamilah Ortega discharge to home/self care.                   Follow-up Information    None         Patient's Medications   Discharge Prescriptions    LIDOCAINE (LIDODERM) 5 %    Apply 1 patch topically over 12 hours daily for 7 days Remove & Discard patch within 12 hours or as directed by MD       Start Date: 1/2/2024  End Date: 1/9/2024       Order Dose: 1 patch       Quantity: 7 patch    Refills: 0    ONDANSETRON (ZOFRAN) 4 MG TABLET    Take 1 tablet (4 mg total) by mouth every 6 (six) hours for 15 days       Start Date: 1/2/2024  End Date: 1/17/2024       Order Dose: 4 mg       Quantity: 60 tablet    Refills: 0       No discharge procedures on file.    PDMP Review       None            ED Provider  Electronically Signed by             Melanie Powers PA-C  01/02/24 7762

## 2024-01-02 NOTE — ED ATTENDING ATTESTATION
"1/2/2024  I, Lexie Tyler DO, saw and evaluated the patient. I have discussed the patient with the resident/non-physician practitioner and agree with the resident's/non-physician practitioner's findings, Plan of Care, and MDM as documented in the resident's/non-physician practitioner's note, except where noted. All available labs and Radiology studies were reviewed.  I was present for key portions of any procedure(s) performed by the resident/non-physician practitioner and I was immediately available to provide assistance.       At this point I agree with the current assessment done in the Emergency Department.  I have conducted an independent evaluation of this patient a history and physical is as follows:23y F, approx 5wk gestation here w/ mult c/o.  Mechanical fall down 4 steps, landed on her back/hit her head, no LOC.  C/o diffuse neck pain, HAs.  Additionally reporting n/v assoc w/ her pregnancy along w/ some \"bleeding\" described as dark/black mucus when she wipes.  Reports intermittent cramping. Exam WNWD nad, mmm, neck no midline tenderness, diffuse paraspinal muscle/trap spasm, tenderness,  supple, resp non-labored, cv regular rate, abd nd, ext no cce, skin dry, neuro non-focal, normal mood.      ED Course     Labs Reviewed   CBC AND DIFFERENTIAL - Abnormal       Result Value Ref Range Status    WBC 4.93  4.31 - 10.16 Thousand/uL Final    RBC 4.17  3.81 - 5.12 Million/uL Final    Hemoglobin 11.4 (*) 11.5 - 15.4 g/dL Final    Hematocrit 35.8  34.8 - 46.1 % Final    MCV 86  82 - 98 fL Final    MCH 27.3  26.8 - 34.3 pg Final    MCHC 31.8  31.4 - 37.4 g/dL Final    RDW 13.1  11.6 - 15.1 % Final    MPV 11.1  8.9 - 12.7 fL Final    Platelets 167  149 - 390 Thousands/uL Final    nRBC 0  /100 WBCs Final    Neutrophils Relative 50  43 - 75 % Final    Immat GRANS % 0  0 - 2 % Final    Lymphocytes Relative 35  14 - 44 % Final    Monocytes Relative 8  4 - 12 % Final    Eosinophils Relative 7 (*) 0 - 6 % Final    " Basophils Relative 0  0 - 1 % Final    Neutrophils Absolute 2.47  1.85 - 7.62 Thousands/µL Final    Immature Grans Absolute 0.01  0.00 - 0.20 Thousand/uL Final    Lymphocytes Absolute 1.71  0.60 - 4.47 Thousands/µL Final    Monocytes Absolute 0.40  0.17 - 1.22 Thousand/µL Final    Eosinophils Absolute 0.32  0.00 - 0.61 Thousand/µL Final    Basophils Absolute 0.02  0.00 - 0.10 Thousands/µL Final   COMPREHENSIVE METABOLIC PANEL - Abnormal    Sodium 133 (*) 135 - 147 mmol/L Final    Potassium 3.7  3.5 - 5.3 mmol/L Final    Chloride 105  96 - 108 mmol/L Final    CO2 24  21 - 32 mmol/L Final    ANION GAP 4  mmol/L Final    BUN 11  5 - 25 mg/dL Final    Creatinine 0.69  0.60 - 1.30 mg/dL Final    Comment: Standardized to IDMS reference method    Glucose 78  65 - 140 mg/dL Final    Comment: If the patient is fasting, the ADA then defines impaired fasting glucose as > 100 mg/dL and diabetes as > or equal to 123 mg/dL.    Calcium 9.0  8.4 - 10.2 mg/dL Final    AST 13  13 - 39 U/L Final    ALT 11  7 - 52 U/L Final    Comment: Specimen collection should occur prior to Sulfasalazine administration due to the potential for falsely depressed results.     Alkaline Phosphatase 35  34 - 104 U/L Final    Total Protein 7.0  6.4 - 8.4 g/dL Final    Albumin 4.1  3.5 - 5.0 g/dL Final    Total Bilirubin 1.21 (*) 0.20 - 1.00 mg/dL Final    Comment: Use of this assay is not recommended for patients undergoing treatment with eltrombopag due to the potential for falsely elevated results.  N-acetyl-p-benzoquinone imine (metabolite of Acetaminophen) will generate erroneously low results in samples for patients that have taken an overdose of Acetaminophen.    eGFR 124  ml/min/1.73sq m Final    Narrative:     National Kidney Disease Foundation guidelines for Chronic Kidney Disease (CKD):     Stage 1 with normal or high GFR (GFR > 90 mL/min/1.73 square meters)    Stage 2 Mild CKD (GFR = 60-89 mL/min/1.73 square meters)    Stage 3A Moderate CKD  (GFR = 45-59 mL/min/1.73 square meters)    Stage 3B Moderate CKD (GFR = 30-44 mL/min/1.73 square meters)    Stage 4 Severe CKD (GFR = 15-29 mL/min/1.73 square meters)    Stage 5 End Stage CKD (GFR <15 mL/min/1.73 square meters)  Note: GFR calculation is accurate only with a steady state creatinine   HCG, QUANTITATIVE - Abnormal    HCG, Quant 13,137 (*) 0 - 5 mIU/mL Final    Narrative:      Expected Ranges:    HCG results between 5 and 25 mIU/mL may be indicative of early pregnancy but should be interpreted in light of the total clinical presentation.    HCG can rise to detectable levels in jj and post menopausal women (0-11.6 mIU/mL).     Approximate               Approximate HCG  Gestation age          Concentration ( mIU/mL)  _____________          ______________________   Weeks                      HCG values  0.2-1                       5-50  1-2                           2-3                         100-5000  3-4                         500-69325  4-5                         1000-70181  5-6                         [id number]  6-8                         [id number]  8-12                        [id number]     URINE MICROSCOPIC - Abnormal    RBC, UA 1-2  None Seen, 1-2 /hpf Final    WBC, UA 1-2  None Seen, 1-2 /hpf Final    Epithelial Cells Occasional  None Seen, Occasional /hpf Final    Bacteria, UA Occasional  None Seen, Occasional /hpf Final    MUCUS THREADS Moderate (*) None Seen Final   POCT PREGNANCY, URINE - Abnormal    EXT Preg Test, Ur Positive (*)  Final    Control Valid   Final   URINE MACROSCOPIC, POC - Abnormal    Color, UA Yellow   Final    Clarity, UA Clear   Final    pH, UA 7.5  4.5 - 8.0 Final    Leukocytes, UA Negative  Negative Final    Nitrite, UA Negative  Negative Final    Protein, UA 30 (1+) (*) Negative mg/dl Final    Glucose, UA Negative  Negative mg/dl Final    Ketones, UA Negative  Negative mg/dl Final    Urobilinogen, UA 0.2  0.2, 1.0 E.U./dl E.U./dl Final    Bilirubin, UA  Negative  Negative Final    Occult Blood, UA Negative  Negative Final    Specific Gravity, UA 1.025  1.003 - 1.030 Final    Narrative:     CLINITEK RESULT   COVID19, INFLUENZA A/B, RSV PCR, [facility] - Normal    SARS-CoV-2 Negative  Negative Final    Comment:      INFLUENZA A PCR Negative  Negative Final    Comment:      INFLUENZA B PCR Negative  Negative Final    Comment:      RSV PCR Negative  Negative Final    Comment:      Narrative:     FOR PEDIATRIC PATIENTS - copy/paste COVID Guidelines URL to browser: [email or web address]    SARS-CoV-2 assay is a Nucleic Acid Amplification assay intended for the  qualitative detection of nucleic acid from SARS-CoV-2 in nasopharyngeal  swabs. Results are for the presumptive identification of SARS-CoV-2 RNA.    Positive results are indicative of infection with SARS-CoV-2, the virus  causing COVID-19, but do not rule out bacterial infection or co-infection  with other viruses. Laboratories within the [place] and its  territories are required to report all positive results to the appropriate  public health authorities. Negative results do not preclude SARS-CoV-2  infection and should not be used as the sole basis for treatment or other  patient management decisions. Negative results must be combined with  clinical observations, patient history, and epidemiological information.  This test has not been FDA cleared or approved.    This test has been authorized by FDA under an Emergency Use Authorization  (EUA). This test is only authorized for the duration of time the  declaration that circumstances exist justifying the authorization of the  emergency use of an in vitro diagnostic tests for detection of SARS-CoV-2  virus and/or diagnosis of COVID-19 infection under section 564(b)(1) of  the Act, 21 U.S.C. 360bbb-3(b)(1), unless the authorization is terminated  or revoked sooner. The test has been validated but independent  review by FDA  and CLIA is pending.    Test performed using [employer] GeneXpert: This RT-PCR assay targets N2,  a region unique to SARS-CoV-2. A conserved region in the E-gene was chosen  for pan-Sarbecovirus detection which includes SARS-CoV-2.    According to CMS-2020-01-R, this platform meets the definition of high-throughput technology.   ABO/RH    ABO Grouping O   Final    Rh Factor Positive   Final     US OB < 14 weeks with transvaginal   Final Result      Intrauterine gestational sac corresponding to a 5-week 1 day pregnancy with yolk sac present. Fetal pole not currently identified. Follow-up recommended.      Workstation performed: [id number]               Critical Care Time  Procedures    1. 5 weeks gestation of pregnancy  ondansetron (ZOFRAN) 4 mg tablet      2. Neck strain, initial encounter  lidocaine (Lidoderm) 5 %      3. Nausea and vomiting during pregnancy          Time reflects when diagnosis was documented in both MDM as applicable and the Disposition within this note       Time User Action Codes Description Comment    1/2/2024 10:50 AM Melanie Powers [Z3A.01] 5 weeks gestation of pregnancy     1/2/2024 10:56 AM Melanie Powers [S16.1XXA] Neck strain, initial encounter     1/2/2024 10:59 AM Melanie Powers [O21.9] Nausea and vomiting during pregnancy           ED Disposition       ED Disposition   Discharge    Condition   Stable    Date/Time   Tue Jan 2, 2024 10:50 AM    Comment   Kamilah Ortega discharge to home/self care.                   Follow-up Information    None

## 2024-01-15 ENCOUNTER — ULTRASOUND (OUTPATIENT)
Dept: OBGYN CLINIC | Facility: CLINIC | Age: 24
End: 2024-01-15

## 2024-01-15 VITALS
WEIGHT: 136 LBS | DIASTOLIC BLOOD PRESSURE: 68 MMHG | BODY MASS INDEX: 24.87 KG/M2 | SYSTOLIC BLOOD PRESSURE: 110 MMHG | HEART RATE: 83 BPM

## 2024-01-15 DIAGNOSIS — O21.9 NAUSEA AND VOMITING DURING PREGNANCY: ICD-10-CM

## 2024-01-15 DIAGNOSIS — Z32.01 POSITIVE PREGNANCY TEST: Primary | ICD-10-CM

## 2024-01-15 PROCEDURE — 99213 OFFICE O/P EST LOW 20 MIN: CPT | Performed by: NURSE PRACTITIONER

## 2024-01-15 PROCEDURE — 76817 TRANSVAGINAL US OBSTETRIC: CPT | Performed by: NURSE PRACTITIONER

## 2024-01-15 RX ORDER — ONDANSETRON 4 MG/1
4 TABLET, FILM COATED ORAL EVERY 8 HOURS PRN
Qty: 60 TABLET | Refills: 3 | Status: SHIPPED | OUTPATIENT
Start: 2024-01-15

## 2024-01-15 RX ORDER — VITAMIN A ACETATE, .BETA.-CAROTENE, ASCORBIC ACID, CHOLECALCIFEROL, .ALPHA.-TOCOPHEROL ACETATE, DL-, THIAMINE MONONITRATE, RIBOFLAVIN, NIACINAMIDE, PYRIDOXINE HYDROCHLORIDE, FOLIC ACID, CYANOCOBALAMIN, CALCIUM CARBONATE, FERROUS FUMARATE, ZINC OXIDE, CUPRIC OXIDE 9.9; 120; 920; 200; 400; 2; 12; 27; 1; 20; 10; 3; 1.84; 3080; 25 MG/1; MG/1; [IU]/1; MG/1; [IU]/1; MG/1; UG/1; MG/1; MG/1; MG/1; MG/1; MG/1; MG/1; [IU]/1; MG/1
1 TABLET, FILM COATED ORAL DAILY
Qty: 90 TABLET | Refills: 4 | Status: SHIPPED | OUTPATIENT
Start: 2024-01-15

## 2024-01-15 NOTE — PATIENT INSTRUCTIONS
Thank you for your confidence in our team.   We appreciate you and welcome your feedback.   If you receive a survey from us, please take a few moments to let us know how we are doing.   Sincerely,  AL Staples       WARNING SIGNS DURING PREGNANCY  Call our office at [phone] for any of the followin. Vaginal bleeding  2. Sharp abdominal pain that does not go away  3. Fever (more than 100.4 and is not relieved by Tylenol)  4. Persistent vomiting lasting greater than 24 hours  5. Chest pain   6. Pain or burning when you urinate  7. Severe headache that doesn't resolve with Tylenol  8. Blurred vision or seeing spots in your vision  9. Sudden swelling of your face or hands  10. Redness, swelling or pain in a leg  11. A sudden weight gain in just a few days  12. Decrease in your baby's movement (after 28 weeks or the 6th month of pregnancy)  13. A loss of watery fluid from your vagina - can be a gush, a trickle or continuous wetness  14. After 20 weeks of pregnancy, rhythmic cramping (greater than 4 per hour) or menstrual like low/pelvic pain

## 2024-01-15 NOTE — PROGRESS NOTES
Kamilah Ortega is a  who presents today for viability/dating ultrasound.  Patient's last menstrual period was 2023.  She reports positive pregnancy test at home on 2023 and then in the the ER on 2024.  She had a pelvic ultrasound performed in the ER on 2024 as well which noted IUP with gestational sac and yolk sac but no fetal pole identified.  She denies vaginal bleeding or abdominal/pelvic pain.  Reports n/v.    /68   Pulse 83   Wt 61.7 kg (136 lb)   LMP 2023 (Approximate) Comment: US estimates 5 weeks 1 day gestation  BMI 24.87 kg/m²   Abdomen soft and non-tender.    Transvaginal Pelvic Ultrasound:  # of fetuses: 1  Intrauterine: yes  Average CRL: 1.27cm (7w3d)  EDC based on CRL: 2024  Fetal cardiac activity: present  FHR: 161  Additional Findings: + yolk sac    The gestational age by LMP is </= 8w 6d and demonstrates 5 or fewer days difference from the gestational age by CRL, therefore the final NOAH will be based on the LMP.    Final NOAH: 2024 by LMP.    Return in 2 weeks for OB intake.  Rx prenatal vitamins sent to pharmacy.    Current Outpatient Medications   Medication Instructions    ondansetron (ZOFRAN) 4 mg, Oral, Every 8 hours PRN    Prenatal Vit-Fe Fumarate-FA (Prenatal Plus Vitamin/Mineral) 27-1 MG TABS 1 tablet, Oral, Daily       AL Staples  [facility] [address]  Dept: [phone]  Dept Fax: [phone]  Loc Appt: [phone]  Loc: [phone]  Loc Fax: [phone]

## 2024-01-25 ENCOUNTER — SOCIAL WORK (OUTPATIENT)
Dept: BEHAVIORAL/MENTAL HEALTH CLINIC | Facility: CLINIC | Age: 24
End: 2024-01-25
Payer: COMMERCIAL

## 2024-01-25 DIAGNOSIS — F33.40 RECURRENT MAJOR DEPRESSIVE DISORDER, IN REMISSION (HCC): ICD-10-CM

## 2024-01-25 DIAGNOSIS — F93.8: Primary | ICD-10-CM

## 2024-01-25 PROCEDURE — 90847 FAMILY PSYTX W/PT 50 MIN: CPT | Performed by: COUNSELOR

## 2024-01-25 NOTE — PSYCH
Behavioral Health Psychotherapy Progress Note    Psychotherapy Provided: Family Therapy    1. Relationship problems specific to childhood and adolescence        2. Recurrent major depressive disorder, in remission (HCC)            Goals addressed in session: Goal 1     DATA: Xavier reports to have been progressing as a couple. They disclosed they've been arguing less, and have been utilizing the checking in skills we discussed in a previous session. Clinician probed and we were able to identify the aspects that each couple is doing to improve their relationship. Clinician commended each one and encouraged them both to identify an activity they can partake in to celebrate their accomplishment. Today we discussed if they've been able to take time and explore what the bible says about marriage, they reported to not have  time together however Kamilah reported to have made an effort to find at least a verse. Clinician provided the couple with a couples wellness assessment and discussed how these interventions would help with identify certain areas within the marriage that needs working on and also identify their strengths. We discussed their Gnosticism beliefs, respecting individuality and identified their levels of commitment.    During this session, this clinician used the following therapeutic modalities: Engagement Strategies, Client-centered Therapy, Mindfulness-based Strategies, Motivational Interviewing, Solution-Focused Therapy, Supportive Psychotherapy, and couples counseling.    Substance Abuse was not addressed during this session. If the client is diagnosed with a co-occurring substance use disorder, please indicate any changes in the frequency or amount of use: Hillary denies substance abuse. Stage of change for addressing substance use diagnoses: No substance use/Not applicable    ASSESSMENT:  Kamilah Ortega presents with a Euthymic/ normal and Depressed mood.     her affect is  "Normal range and intensity and Constricted, which is congruent, with her mood and the content of the session. The client has made progress on their goals.    Xavier denies suicidal/homicidal ideations or self-injurious behaviors.  Kamilah Ortega presents with a none risk of suicide, none risk of self-harm, and none risk of harm to others.    For any risk assessment that surpasses a \"low\" rating, a safety plan must be developed.    A safety plan was indicated: no  If yes, describe in detail safety plan not indicated.    PLAN: Between sessions, Kamilah Ortega will celebrate as a couple the progress they've made in reducing their arguments. At the next session, the therapist will use Engagement Strategies, Client-centered Therapy, Cognitive Behavioral Therapy, Cognitive Processing Therapy, Mindfulness-based Strategies, Motivational Interviewing, Solution-Focused Therapy, Supportive Psychotherapy, and Couples Counseling  to address their differences as a couple.    Behavioral Health Treatment Plan and Discharge Planning: Kamilah Ortega is aware of and agrees to continue to work on their treatment plan. They have identified and are working toward their discharge goals. yes    Visit start and stop times:    01/25/24  Start Time: 1401  Stop Time: 1453  Total Visit Time: 52 minutes  "

## 2024-01-29 ENCOUNTER — INITIAL PRENATAL (OUTPATIENT)
Dept: OBGYN CLINIC | Facility: CLINIC | Age: 24
End: 2024-01-29

## 2024-01-29 ENCOUNTER — PATIENT OUTREACH (OUTPATIENT)
Dept: OBGYN CLINIC | Facility: CLINIC | Age: 24
End: 2024-01-29

## 2024-01-29 VITALS
SYSTOLIC BLOOD PRESSURE: 106 MMHG | DIASTOLIC BLOOD PRESSURE: 71 MMHG | BODY MASS INDEX: 25.37 KG/M2 | HEART RATE: 83 BPM | WEIGHT: 134.4 LBS | HEIGHT: 61 IN

## 2024-01-29 DIAGNOSIS — Z31.430 ENCOUNTER OF FEMALE FOR TESTING FOR GENETIC DISEASE CARRIER STATUS FOR PROCREATIVE MANAGEMENT: ICD-10-CM

## 2024-01-29 DIAGNOSIS — Z34.91 PRENATAL CARE IN FIRST TRIMESTER: Primary | ICD-10-CM

## 2024-01-29 DIAGNOSIS — Z3A.09 9 WEEKS GESTATION OF PREGNANCY: ICD-10-CM

## 2024-01-29 PROCEDURE — 99211 OFF/OP EST MAY X REQ PHY/QHP: CPT

## 2024-01-29 PROCEDURE — G9920 SCRNING PERF AND NEGATIVE: HCPCS

## 2024-01-29 NOTE — LETTER
1/29/2024      This letter is to confirm that Kamilah Ortega is pregnant and is under our care.  Her Estimated Date of Delivery: 8/31/24.    If you have any questions or concerns, please contact our office.  Thank you,    AL Remy

## 2024-01-29 NOTE — PROGRESS NOTES
"OBSTETRIC INTAKE VISIT    Kamilah Ortega presents today for initial OB visit and intake at 9w2d. LMP - 23. History obtained from patient and she reports it as follows:    Past Medical History:   Diagnosis Date    Asthma     Depression      Past Surgical History:   Procedure Laterality Date    MT  DELIVERY ONLY N/A 2021    Procedure:  SECTION ();  Surgeon: Sari SAPP MD;  [facility]: [facility];  Service: Obstetrics     OB History    Para Term  AB Living   2 1 0 1 0 1   SAB IAB Ectopic Multiple Live Births   0 0 0 0 1      # Outcome Date GA Lbr Aroldo/2nd Weight Sex Delivery Anes PTL Lv   2 Current            1  21 36w6d  3140 g (6 lb 14.8 oz) F CS-LTranv Spinal Y MARISA     Social History     Tobacco Use    Smoking status: Never    Smokeless tobacco: Never   Vaping Use    Vaping status: Never Used   Substance Use Topics    Alcohol use: Not Currently    Drug use: Not Currently     Types: Marijuana       Current Outpatient Medications   Medication Instructions    ondansetron (ZOFRAN) 4 mg, Oral, Every 8 hours PRN    Prenatal Vit-Fe Fumarate-FA (Prenatal Plus Vitamin/Mineral) 27-1 MG TABS 1 tablet, Oral, Daily       Allergies   Allergen Reactions    Fish-Derived Products - Food Allergy     Isoflavones (Soy)     Peanut Oil - Food Allergy        Vitals: /71 (BP Location: Left arm, Patient Position: Sitting, Cuff Size: Standard)   Pulse 83   Ht 5' 1\" (1.549 m)   Wt 61 kg (134 lb 6.4 oz)   LMP 2023 Comment: US estimates 5 weeks 1 day gestation  Breastfeeding No   BMI 25.39 kg/m²     Review of Systems:  Denies vaginal bleeding or leaking fluid.  Denies abdominal/pelvic pain or contractions.    Plan:  1. OB labwork ordered today to include Prenatal Panel, HCV antibody, Hgb fractionation cascade, Prenatal Carrier Screen Panel.  Other labs include Glucose 1H PG.  Advised patient to have drawn within the next few days.  2. Will help pt schedule " ultrasound with MFM.  3. Return 2/17/24 for H&P prenatal visit.  4. Referrals placed for WIC, , and [facility].  5. Given vaccines: Flu given.  6. Patient's depression screening was assessed with a PHQ-2 score of 0. Their PHQ-9 score was 0. Clinically patient does not have depression. No treatment is required.   in to see patient.  7. Reviewed the following educational topics with patient:   -routine prenatal visit/ultrasound/labwork schedule   -hospital for delivery and office phone/answering service contact information   -nutritional demands of pregnancy, healthy dietary habits   -listeria, toxoplasmosis, seafood precautions   -weight gain expectations (based on pre-pregnant BMI)   -exercise, rest, and sexual activity during pregnancy   -abstinence from alcohol, tobacco, and illegal drugs   -common discomforts of pregnancy and appropriate management   -OTC medications safe to use in pregnancy   -genetic screening options   -vaccines in pregnancy   -symptoms to report to OB provider    -signs of PTL and pre-eclampsia    -vaginal bleeding/leaking of fluid    -severe n/v-unable to tolerate ANY food/fluids for more than 24 hours

## 2024-01-29 NOTE — PATIENT INSTRUCTIONS
WARNING SIGNS DURING PREGNANCY  Call our office at [phone] for any of the followin. Vaginal bleeding  2. Sharp abdominal pain that does not go away  3. Fever (more than 100.4 and is not relieved by Tylenol)  4. Persistent vomiting lasting greater than 24 hours  5. Chest pain   6. Pain or burning when you urinate  7. Severe headache that doesn't resolve with Tylenol  8. Blurred vision or seeing spots in your vision  9. Sudden swelling of your face or hands  10. Redness, swelling or pain in a leg  11. A sudden weight gain in just a few days  12. Decrease in your baby's movement (after 28 weeks or the 6th month of pregnancy)  13. A loss of watery fluid from your vagina - can be a gush, a trickle or continuous wetness  14. After 20 weeks of pregnancy, rhythmic cramping (greater than 4 per hour) or menstrual like low/pelvic pain

## 2024-01-29 NOTE — LETTER
"    [facility] REFERRAL      Date: 1/29/2024    Patient name: Kamilah Ortega    YOB: 2000    Estimated Date of Delivery: 8/31/24      /71 (BP Location: Left arm, Patient Position: Sitting, Cuff Size: Standard)   Pulse 83   Ht 5' 1\" (1.549 m)   Wt 61 kg (134 lb 6.4 oz)   LMP 11/25/2023 Comment: US estimates 5 weeks 1 day gestation  Breastfeeding No   BMI 25.39 kg/m²         Thank you,  AL Remy            [facility] locations:   1. [facility]       [address] [place] [address]       Phone: [phone]       Fax: [phone]     2. [facility]       [address] [address]       Phone: [phone]       Fax: [phone]       "

## 2024-01-29 NOTE — PROGRESS NOTES
JOAQUIN BALDERRAMA was referred by AL Daley to see pt for a PN SW assessment in the first trimester. Pt is currently , her NOAH is 2024, she is 9w2dGA. The pt is english speaking, she is here today with her 1yo daughter and her  for her nurse intake.     Pt reports this pregnancy is planned and welcomed. She lives with her  and their 1yo daughter. 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 Pt has MA and plans to apply for WIC. Pt has her own vehicle for medical appointments. Pt has no concerns with obtaining baby supplies.     Pt denies any CYS, legal, IPV, MH or D&A history or concerns. JOAQUIN BALDERRAMA encouraged the pt to outreach as needed but will otherwise close this referral at this time.

## 2024-02-08 ENCOUNTER — SOCIAL WORK (OUTPATIENT)
Dept: BEHAVIORAL/MENTAL HEALTH CLINIC | Facility: CLINIC | Age: 24
End: 2024-02-08
Payer: COMMERCIAL

## 2024-02-08 ENCOUNTER — TELEPHONE (OUTPATIENT)
Dept: FAMILY MEDICINE CLINIC | Facility: CLINIC | Age: 24
End: 2024-02-08

## 2024-02-08 DIAGNOSIS — F93.8: Primary | ICD-10-CM

## 2024-02-08 DIAGNOSIS — O21.9 NAUSEA AND VOMITING DURING PREGNANCY: ICD-10-CM

## 2024-02-08 PROCEDURE — 90847 FAMILY PSYTX W/PT 50 MIN: CPT | Performed by: COUNSELOR

## 2024-02-08 NOTE — PSYCH
"Behavioral Health Psychotherapy Progress Note    Psychotherapy Provided: Individual Psychotherapy     1. Relationship problems specific to childhood and adolescence            Goals addressed in session: Goal 1     DATA: Shruthi reports how they were able to celebrate their accomplishments. Kamilah however highlighted how the celebration was short lived due to an argument they've gotten into. We explored and processed the experience and what has been the triggers. Clinician actively listened and challenged each couple to share their needs explore how they can go about communicating better. We discussed gender roles, and expectations as well. We also continue exploring their communication styles and specifically identified their opinion on how they share openly with each other.     During this session, this clinician used the following therapeutic modalities: Engagement Strategies, Client-centered Therapy, Cognitive Behavioral Therapy, Cognitive Processing Therapy, Mindfulness-based Strategies, Motivational Interviewing, Solution-Focused Therapy, Supportive Psychotherapy, and Couples Counseling.    Substance Abuse was not addressed during this session. If the client is diagnosed with a co-occurring substance use disorder, please indicate any changes in the frequency or amount of use: Xavier denies substance abuse. Stage of change for addressing substance use diagnoses: No substance use/Not applicable    ASSESSMENT:  Kamilah Ortega presents with a Euthymic/ normal mood.     her affect is Normal range and intensity, which is congruent, with her mood and the content of the session. The client has made progress on their goals.    Xavier denies suicidal/homicidal ideations or self-injurious behaviors.  Kamilah Ortega presents with a none risk of suicide, none risk of self-harm, and none risk of harm to others.    For any risk assessment that surpasses a \"low\" rating, a safety plan must " be developed.    A safety plan was indicated: no  If yes, describe in detail safety plan not indicated.    PLAN: Between sessions, Kamilah Ortega will continue being mindful of their love language. At the next session, the therapist will use Engagement Strategies, Client-centered Therapy, Cognitive Behavioral Therapy, Cognitive Processing Therapy, Mindfulness-based Strategies, Motivational Interviewing, Solution-Focused Therapy, and Supportive Psychotherapy to address the barriers within their relationship.    Behavioral Health Treatment Plan and Discharge Planning: Kamilah Ortega is aware of and agrees to continue to work on their treatment plan. They have identified and are working toward their discharge goals. yes    Visit start and stop times:    02/08/24  Start Time: 1413  Stop Time: 1458  Total Visit Time: 45 minutes

## 2024-02-12 ENCOUNTER — APPOINTMENT (OUTPATIENT)
Dept: LAB | Facility: HOSPITAL | Age: 24
End: 2024-02-12
Payer: COMMERCIAL

## 2024-02-12 DIAGNOSIS — Z34.91 PRENATAL CARE IN FIRST TRIMESTER: ICD-10-CM

## 2024-02-12 DIAGNOSIS — Z31.430 ENCOUNTER OF FEMALE FOR TESTING FOR GENETIC DISEASE CARRIER STATUS FOR PROCREATIVE MANAGEMENT: ICD-10-CM

## 2024-02-12 DIAGNOSIS — Z3A.09 9 WEEKS GESTATION OF PREGNANCY: ICD-10-CM

## 2024-02-12 LAB
ABO GROUP BLD: NORMAL
BASOPHILS # BLD AUTO: 0.01 THOUSANDS/ÂΜL (ref 0–0.1)
BASOPHILS NFR BLD AUTO: 0 % (ref 0–1)
BILIRUB UR QL STRIP: NEGATIVE
BLD GP AB SCN SERPL QL: NEGATIVE
CLARITY UR: CLEAR
COLOR UR: ABNORMAL
EOSINOPHIL # BLD AUTO: 0.35 THOUSAND/ÂΜL (ref 0–0.61)
EOSINOPHIL NFR BLD AUTO: 5 % (ref 0–6)
ERYTHROCYTE [DISTWIDTH] IN BLOOD BY AUTOMATED COUNT: 13.2 % (ref 11.6–15.1)
GLUCOSE 1H P 50 G GLC PO SERPL-MCNC: 74 MG/DL (ref 40–134)
GLUCOSE UR STRIP-MCNC: NEGATIVE MG/DL
HBV SURFACE AB SER-ACNC: 3.38 MIU/ML
HBV SURFACE AG SER QL: NORMAL
HCT VFR BLD AUTO: 35.4 % (ref 34.8–46.1)
HCV AB SER QL: NORMAL
HGB BLD-MCNC: 11.3 G/DL (ref 11.5–15.4)
HGB UR QL STRIP.AUTO: NEGATIVE
HIV 1+2 AB+HIV1 P24 AG SERPL QL IA: NORMAL
HIV 2 AB SERPL QL IA: NORMAL
HIV1 AB SERPL QL IA: NORMAL
HIV1 P24 AG SERPL QL IA: NORMAL
IMM GRANULOCYTES # BLD AUTO: 0.02 THOUSAND/UL (ref 0–0.2)
IMM GRANULOCYTES NFR BLD AUTO: 0 % (ref 0–2)
KETONES UR STRIP-MCNC: NEGATIVE MG/DL
LEUKOCYTE ESTERASE UR QL STRIP: NEGATIVE
LYMPHOCYTES # BLD AUTO: 1.72 THOUSANDS/ÂΜL (ref 0.6–4.47)
LYMPHOCYTES NFR BLD AUTO: 23 % (ref 14–44)
MCH RBC QN AUTO: 27.8 PG (ref 26.8–34.3)
MCHC RBC AUTO-ENTMCNC: 31.9 G/DL (ref 31.4–37.4)
MCV RBC AUTO: 87 FL (ref 82–98)
MONOCYTES # BLD AUTO: 0.35 THOUSAND/ÂΜL (ref 0.17–1.22)
MONOCYTES NFR BLD AUTO: 5 % (ref 4–12)
NEUTROPHILS # BLD AUTO: 4.97 THOUSANDS/ÂΜL (ref 1.85–7.62)
NEUTS SEG NFR BLD AUTO: 67 % (ref 43–75)
NITRITE UR QL STRIP: NEGATIVE
NRBC BLD AUTO-RTO: 0 /100 WBCS
PH UR STRIP.AUTO: 6 [PH]
PLATELET # BLD AUTO: 175 THOUSANDS/UL (ref 149–390)
PMV BLD AUTO: 10.8 FL (ref 8.9–12.7)
PROT UR STRIP-MCNC: NEGATIVE MG/DL
RBC # BLD AUTO: 4.07 MILLION/UL (ref 3.81–5.12)
RH BLD: POSITIVE
RUBV IGG SERPL IA-ACNC: 47.3 IU/ML
SP GR UR STRIP.AUTO: 1.02 (ref 1–1.04)
SPECIMEN EXPIRATION DATE: NORMAL
TREPONEMA PALLIDUM IGG+IGM AB [PRESENCE] IN SERUM OR PLASMA BY IMMUNOASSAY: NORMAL
UROBILINOGEN UA: NEGATIVE MG/DL
WBC # BLD AUTO: 7.42 THOUSAND/UL (ref 4.31–10.16)

## 2024-02-12 PROCEDURE — 86803 HEPATITIS C AB TEST: CPT

## 2024-02-12 PROCEDURE — 87389 HIV-1 AG W/HIV-1&-2 AB AG IA: CPT

## 2024-02-12 PROCEDURE — 85025 COMPLETE CBC W/AUTO DIFF WBC: CPT

## 2024-02-12 PROCEDURE — 82950 GLUCOSE TEST: CPT

## 2024-02-12 PROCEDURE — 87340 HEPATITIS B SURFACE AG IA: CPT

## 2024-02-12 PROCEDURE — 86706 HEP B SURFACE ANTIBODY: CPT

## 2024-02-12 PROCEDURE — 86762 RUBELLA ANTIBODY: CPT

## 2024-02-12 PROCEDURE — 36415 COLL VENOUS BLD VENIPUNCTURE: CPT

## 2024-02-12 PROCEDURE — 86780 TREPONEMA PALLIDUM: CPT

## 2024-02-12 PROCEDURE — 86900 BLOOD TYPING SEROLOGIC ABO: CPT

## 2024-02-12 PROCEDURE — 86901 BLOOD TYPING SEROLOGIC RH(D): CPT

## 2024-02-12 PROCEDURE — 87086 URINE CULTURE/COLONY COUNT: CPT

## 2024-02-12 PROCEDURE — 83020 HEMOGLOBIN ELECTROPHORESIS: CPT

## 2024-02-12 PROCEDURE — 86850 RBC ANTIBODY SCREEN: CPT

## 2024-02-13 DIAGNOSIS — O21.9 NAUSEA AND VOMITING DURING PREGNANCY: ICD-10-CM

## 2024-02-13 PROBLEM — Z78.9 NOT IMMUNE TO HEPATITIS B VIRUS: Status: ACTIVE | Noted: 2024-02-13

## 2024-02-13 RX ORDER — ONDANSETRON 4 MG/1
4 TABLET, FILM COATED ORAL EVERY 8 HOURS PRN
Qty: 60 TABLET | Refills: 0 | Status: CANCELLED | OUTPATIENT
Start: 2024-02-13

## 2024-02-14 ENCOUNTER — TELEPHONE (OUTPATIENT)
Dept: OBGYN CLINIC | Facility: CLINIC | Age: 24
End: 2024-02-14

## 2024-02-14 LAB — BACTERIA UR CULT: NORMAL

## 2024-02-14 NOTE — TELEPHONE ENCOUNTER
Patient called because she is experiencing vaginal Mucus and discharge when using the bathroom.patient describes it as milky white discharge and she says this began today. Spoke to rajan rogers and she says that this is normal because it may be because of her menses cycly that she no longer has because she is 11wks and 5 days ob. Told patient this information and told her that I will put this in her chart and to address this issue when she comes to her next visit, patient verbalized understanding.

## 2024-02-15 LAB
HGB A MFR BLD: 2.5 % (ref 1.8–3.2)
HGB A MFR BLD: 97.5 % (ref 96.4–98.8)
HGB F MFR BLD: 0 % (ref 0–2)
HGB FRACT BLD-IMP: NORMAL
HGB S MFR BLD: 0 %

## 2024-02-16 LAB
CITATION REF LAB TEST: NORMAL
CLINICAL INFO: NORMAL
ETHNIC BACKGROUND STATED: NORMAL
GENE DIS ANL CARRIER INTERP-IMP: NORMAL
GENE MUT TESTED BLD/T: NORMAL
LAB DIRECTOR NAME PROVIDER: NORMAL
REASON FOR REFERRAL (NARRATIVE): NORMAL
RECOMMENDATION PATIENT DOC-IMP: NORMAL
REF LAB TEST METHOD: NORMAL
SERVICE CMNT-IMP: NORMAL
SMN1 GENE MUT ANL BLD/T: NORMAL
SPECIMEN SOURCE: NORMAL

## 2024-02-17 ENCOUNTER — INITIAL PRENATAL (OUTPATIENT)
Dept: OBGYN CLINIC | Facility: CLINIC | Age: 24
End: 2024-02-17

## 2024-02-17 VITALS
HEIGHT: 61 IN | BODY MASS INDEX: 25.57 KG/M2 | WEIGHT: 135.4 LBS | SYSTOLIC BLOOD PRESSURE: 115 MMHG | DIASTOLIC BLOOD PRESSURE: 69 MMHG | HEART RATE: 96 BPM

## 2024-02-17 DIAGNOSIS — J45.909 ASTHMA DURING PREGNANCY: ICD-10-CM

## 2024-02-17 DIAGNOSIS — Z3A.12 12 WEEKS GESTATION OF PREGNANCY: ICD-10-CM

## 2024-02-17 DIAGNOSIS — O99.519 ASTHMA DURING PREGNANCY: ICD-10-CM

## 2024-02-17 DIAGNOSIS — Z34.91 PRENATAL CARE IN FIRST TRIMESTER: Primary | ICD-10-CM

## 2024-02-17 PROBLEM — O34.219 HISTORY OF CESAREAN DELIVERY, CURRENTLY PREGNANT: Status: ACTIVE | Noted: 2024-02-17

## 2024-02-17 PROBLEM — Z83.3 FAMILY HISTORY OF DIABETES MELLITUS IN FIRST DEGREE RELATIVE: Status: ACTIVE | Noted: 2024-02-17

## 2024-02-17 PROCEDURE — G0145 SCR C/V CYTO,THINLAYER,RESCR: HCPCS | Performed by: NURSE PRACTITIONER

## 2024-02-17 PROCEDURE — 87491 CHLMYD TRACH DNA AMP PROBE: CPT | Performed by: NURSE PRACTITIONER

## 2024-02-17 PROCEDURE — 99213 OFFICE O/P EST LOW 20 MIN: CPT | Performed by: NURSE PRACTITIONER

## 2024-02-17 PROCEDURE — 87591 N.GONORRHOEAE DNA AMP PROB: CPT | Performed by: NURSE PRACTITIONER

## 2024-02-17 RX ORDER — ALBUTEROL SULFATE 90 UG/1
2 AEROSOL, METERED RESPIRATORY (INHALATION) EVERY 6 HOURS PRN
Qty: 8 G | Refills: 3 | Status: SHIPPED | OUTPATIENT
Start: 2024-02-17

## 2024-02-17 NOTE — PROGRESS NOTES
Kamilah Ortega presents today for OB H&P visit at 12w0d.  GD=795/69  Wt=61.4 kg (135 lb 6.4 oz); Body mass index is 25.58 kg/m².; TWG=1.089 kg (2 lb 6.4 oz)  Fetal Heart Rate: 156  Abdomen: soft, non-tender.  She reports increased vaginal discharge but denies odor or itching/irritation.  On exam, normal vaginal discharge is noted.  No vaginal erythema.  Denies vaginal bleeding or leaking of fluid.  Pap/GC/CT collected today.  Scheduled for ultrasound 2/21/24.  Reviewed common discomforts of pregnancy in first trimester and warning signs.  Advised to continue medications and return in 4 weeks.      Current Outpatient Medications   Medication Instructions    albuterol (ProAir HFA) 90 mcg/act inhaler 2 puffs, Inhalation, Every 6 hours PRN    ondansetron (ZOFRAN) 4 mg, Oral, Every 8 hours PRN    Prenatal Vit-Fe Fumarate-FA (Prenatal Plus Vitamin/Mineral) 27-1 MG TABS 1 tablet, Oral, Daily       Laboratory workup: initial OB labs (done 2/12/24)    Genetic Screening: scheduled w/MFM on 2/21/24    Vaccinations: influenza (given 10/1/23); Tdap (will offer after 27 weeks); RSV (not indicated outside RSV season); COVID-19 (rec'd x2 doses in 2021 - will bring documentation)    Postpartum contraception: desires Mirena IUD    Fetal Ultrasounds:  1/15/24 (7w3d) EDC confirmed      G2 Problems (from 01/15/24 to present)       Problem Noted Resolved    History of c/s x1 2/17/2024 by AL Staples No    Overview Signed 2/17/2024 11:04 AM by AL Staples     2021 Op note documents LTV uterine incision; indication breech presentation  Desires TOLAC         Family hx DM 2/17/2024 by AL Staples No    Overview Signed 2/17/2024 11:05 AM by AL Staples     Pt's mother w/DM  Needs early GDM screen - done WNL         Asthma during pregnancy 2/17/2024 by AL Staples No    Overview Signed 2/17/2024 11:08 AM by AL Staples     Albuterol prn         Not immune to HBV  2024 by AL Staples No    Overview Addendum 2024 11:09 AM by AL Staples     Referred to PCP                 Past Medical History:   Diagnosis Date    Asthma     Depression      Past Surgical History:   Procedure Laterality Date    [place]  DELIVERY ONLY N/A 2021    Procedure:  SECTION ();  Surgeon: Sari SAPP MD;  Location: [facility];  Service: Obstetrics     OB History          2    Para   1    Term   0       1    AB   0    Living   1         SAB   0    IAB   0    Ectopic   0    Multiple   0    Live Births   1               Social History     Tobacco Use    Smoking status: Never    Smokeless tobacco: Never   Vaping Use    Vaping status: Never Used   Substance Use Topics    Alcohol use: Not Currently    Drug use: Not Currently     Types: Marijuana

## 2024-02-17 NOTE — PATIENT INSTRUCTIONS
Thank you for your confidence in our team.   We appreciate you and welcome your feedback.   If you receive a survey from us, please take a few moments to let us know how we are doing.   Sincerely,  AL Staples       WARNING SIGNS DURING PREGNANCY  Call our office at [phone] for any of the followin. Vaginal bleeding  2. Sharp abdominal pain that does not go away  3. Fever (more than 100.4 and is not relieved by Tylenol)  4. Persistent vomiting lasting greater than 24 hours  5. Chest pain   6. Pain or burning when you urinate  7. Severe headache that doesn't resolve with Tylenol  8. Blurred vision or seeing spots in your vision  9. Sudden swelling of your face or hands  10. Redness, swelling or pain in a leg  11. A sudden weight gain in just a few days  12. Decrease in your baby's movement (after 28 weeks or the 6th month of pregnancy)  13. A loss of watery fluid from your vagina - can be a gush, a trickle or continuous wetness  14. After 20 weeks of pregnancy, rhythmic cramping (greater than 4 per hour) or menstrual like low/pelvic pain

## 2024-02-20 LAB
C TRACH DNA SPEC QL NAA+PROBE: NEGATIVE
N GONORRHOEA DNA SPEC QL NAA+PROBE: NEGATIVE

## 2024-02-21 ENCOUNTER — ROUTINE PRENATAL (OUTPATIENT)
Age: 24
End: 2024-02-21
Payer: COMMERCIAL

## 2024-02-21 VITALS
BODY MASS INDEX: 25.75 KG/M2 | HEART RATE: 87 BPM | HEIGHT: 61 IN | DIASTOLIC BLOOD PRESSURE: 62 MMHG | WEIGHT: 136.4 LBS | SYSTOLIC BLOOD PRESSURE: 104 MMHG

## 2024-02-21 DIAGNOSIS — Z36.82 ENCOUNTER FOR (NT) NUCHAL TRANSLUCENCY SCAN: ICD-10-CM

## 2024-02-21 DIAGNOSIS — Z3A.12 12 WEEKS GESTATION OF PREGNANCY: ICD-10-CM

## 2024-02-21 DIAGNOSIS — J45.909 ASTHMA DURING PREGNANCY: ICD-10-CM

## 2024-02-21 DIAGNOSIS — O09.899 HISTORY OF PRETERM DELIVERY, CURRENTLY PREGNANT: Primary | ICD-10-CM

## 2024-02-21 DIAGNOSIS — O34.219 HISTORY OF CESAREAN DELIVERY, ANTEPARTUM: ICD-10-CM

## 2024-02-21 DIAGNOSIS — O99.519 ASTHMA DURING PREGNANCY: ICD-10-CM

## 2024-02-21 DIAGNOSIS — Z3A.09 9 WEEKS GESTATION OF PREGNANCY: ICD-10-CM

## 2024-02-21 DIAGNOSIS — Z33.1 PREGNANT STATE, INCIDENTAL: ICD-10-CM

## 2024-02-21 DIAGNOSIS — Z34.91 PRENATAL CARE IN FIRST TRIMESTER: ICD-10-CM

## 2024-02-21 DIAGNOSIS — Z36.0 ENCOUNTER FOR ANTENATAL SCREENING FOR CHROMOSOMAL ANOMALIES: ICD-10-CM

## 2024-02-21 PROCEDURE — 99244 OFF/OP CNSLTJ NEW/EST MOD 40: CPT | Performed by: OBSTETRICS & GYNECOLOGY

## 2024-02-21 PROCEDURE — 76813 OB US NUCHAL MEAS 1 GEST: CPT | Performed by: OBSTETRICS & GYNECOLOGY

## 2024-02-21 PROCEDURE — 36415 COLL VENOUS BLD VENIPUNCTURE: CPT | Performed by: OBSTETRICS & GYNECOLOGY

## 2024-02-21 PROCEDURE — 76801 OB US < 14 WKS SINGLE FETUS: CPT | Performed by: OBSTETRICS & GYNECOLOGY

## 2024-02-21 RX ORDER — ASPIRIN 81 MG/1
162 TABLET ORAL DAILY
Qty: 180 TABLET | Refills: 3 | Status: SHIPPED | OUTPATIENT
Start: 2024-02-21

## 2024-02-21 NOTE — LETTER
"2024    AL Staples  [address] [address]    Patient: Kamilah Ortega   YOB: 2000   Date of Visit: 2024   Gestational age 12w4d   Nature of this communication: Routine - normal NT scan       Dear Rhoda Mack,    This patient was seen recently in our  office.  The content of my evaluation today is in the ultrasound report under \"OB Procedures\" tab.     Please don't hesitate to contact our office with any concerns or questions.     Sincerely,      Kandi Cadena MD  Attending Physician, Maternal-Fetal Medicine  [facility]      "

## 2024-02-21 NOTE — PROGRESS NOTES
Patient chose to have [facility] [id number] Non-Invasive Prenatal Screen [id number] PLUS w/ SCA, WITH fetal sex.  Patient choose billed through insurance.     Patient given brochure and is aware [facility] will contact patient's insurance and coordinate coverage.  Provided [facility] contact information. General inquiries [phone], Cost estimates [phone] and [facility] Billing [phone]. Website [email or web address].     Blood collection tubes labeled with patient identifiers (name, medical record number, and date of birth).     Filled out [facility] order form. Patient chose to have blood drawn in our Maternal Fetal Medicine office. Blood work drawn by Ana Maria Blankenship   If patient had blood work in office: Blood drawn from right arm. Needle used with a butterfly needle.   If patient chose to have blood work drawn at a [facility] lab we requested patient notify MFM (via phone call or [email or web address] message) when blood collected so office can follow up on results.     Copy of lab order scanned to Epic media.     Maternal Fetal Medicine will have results in approximately 5-7 business days and will call patient or notify via [email or web address].  Patient aware viewing lab result online will reveal fetal sex if ordered.    Patient verbalized understanding of all instructions and no questions at this time.

## 2024-02-21 NOTE — PROGRESS NOTES
"[facility]: Kamilah Ortega was seen today for nuchal translucency ultrasound.  See ultrasound report under \"OB Procedures\" tab.   Please don't hesitate to contact our office with any concerns or questions.  -Kandi Cadena MD      "

## 2024-02-22 ENCOUNTER — SOCIAL WORK (OUTPATIENT)
Dept: BEHAVIORAL/MENTAL HEALTH CLINIC | Facility: CLINIC | Age: 24
End: 2024-02-22
Payer: COMMERCIAL

## 2024-02-22 DIAGNOSIS — F93.8: Primary | ICD-10-CM

## 2024-02-22 DIAGNOSIS — F33.40 RECURRENT MAJOR DEPRESSIVE DISORDER, IN REMISSION (HCC): ICD-10-CM

## 2024-02-22 LAB
LAB AP GYN PRIMARY INTERPRETATION: NORMAL
LAB AP LMP: NORMAL
Lab: NORMAL

## 2024-02-22 PROCEDURE — 90847 FAMILY PSYTX W/PT 50 MIN: CPT | Performed by: COUNSELOR

## 2024-02-22 NOTE — PSYCH
Behavioral Health Psychotherapy Progress Note    Psychotherapy Provided: Family Therapy    1. Relationship problems specific to childhood and adolescence        2. Recurrent major depressive disorder, in remission (HCC)            Goals addressed in session: Goal 1     DATA: Xavier shared the progress they've had within their relationship. Clinician probed and discussed their highs and lows. Kamilah shared a small argument they had, and how they were able to resolve the issue right after. Clinician commended Kamilah and Bethel for looking to resolve the issue and talk things out the same day. They shared they have not had an incident in which they've screamed at each other. Clinician commended their progress and their efforts in immediately looking to resolve. We continue discussed the communication barriers they've had. Today we focused on identifying their levels of conflict resolution and exploring if they feel their partner understands them. We explored forms in which they can both recognize their partner's communication styles and identify forms in which they can check in with each other without responding in a defensive form. We also explore the origins that may have caused for their response to constantly be self-defense    During this session, this clinician used the following therapeutic modalities: Engagement Strategies, Client-centered Therapy, Cognitive Behavioral Therapy, Cognitive Processing Therapy, Mindfulness-based Strategies, Motivational Interviewing, Solution-Focused Therapy, Supportive Psychotherapy, and Marriage Counseling    Substance Abuse was not addressed during this session. If the client is diagnosed with a co-occurring substance use disorder, please indicate any changes in the frequency or amount of use: Xavier denies substance abuse. Stage of change for addressing substance use diagnoses: No substance use/Not applicable    ASSESSMENT: Elana Ortega  "presents with a Euthymic/ normal mood.     her affect is Normal range and intensity, which is congruent, with her mood and the content of the session. The client has made progress on their goals.    Xavier both deny suicidal/homicidal ideations or self-injurious behaviors. Elana Ortega presents with a none risk of suicide, none risk of self-harm, and none risk of harm to others.    For any risk assessment that surpasses a \"low\" rating, a safety plan must be developed.    A safety plan was indicated: no  If yes, describe in detail safety plan not indicated.    PLAN: Between sessions, Elana Ortega will continue working on identifying moments in which they need to check in instead of react impulsively towards each other. At the next session, the therapist will use Engagement Strategies, Client-centered Therapy, Cognitive Behavioral Therapy, Cognitive Processing Therapy, Mindfulness-based Strategies, Motivational Interviewing, Solution-Focused Therapy, Supportive Psychotherapy, and Marriage Counseling  to address their progress with conflict resolution.    Behavioral Health Treatment Plan and Discharge Planning: Elana Ortega is aware of and agrees to continue to work on their treatment plan. They have identified and are working toward their discharge goals. yes    Visit start and stop times:    02/22/24  Start Time: 1408  Stop Time: 1458  Total Visit Time: 50 minutes  "

## 2024-02-27 LAB
CFDNA.FET/CFDNA.TOTAL SFR FETUS: NORMAL %
CFDNA.FET/CFDNA.TOTAL SFR FETUS: NORMAL %
CITATION REF LAB TEST: NORMAL
FET 13+18+21+X+Y ANEUP PLAS.CFDNA: NEGATIVE
FET CHR 21 TS PLAS.CFDNA QL: NEGATIVE
FET CHR 21 TS PLAS.CFDNA QL: NEGATIVE
FET MS X RISK WBC.DNA+CFDNA QL: NOT DETECTED
FET SEX PLAS.CFDNA DOSAGE CFDNA: NORMAL
FET TS 13 RISK PLAS.CFDNA QL: NEGATIVE
FET X + Y ANEUP RISK PLAS.CFDNA SEQ-IMP: NOT DETECTED
GESTATION: NORMAL
GESTATIONAL AGE > 9:: YES
LAB DIRECTOR NAME PROVIDER: NORMAL
LABORATORY COMMENT REPORT: NORMAL
LIMITATIONS OF THE TEST: NORMAL
NEGATIVE PREDICTIVE VALUE: NORMAL
PERFORMANCE CHARACTERISTICS: NORMAL
POSITIVE PREDICTIVE VALUE: NORMAL
REF LAB TEST METHOD: NORMAL
SL AMB NOTE:: NORMAL
TEST PERFORMANCE INFO SPEC: NORMAL

## 2024-02-28 LAB
CITATION REF LAB TEST: NORMAL
CLINICAL INFO: NORMAL
ETHNIC BACKGROUND STATED: NORMAL
FMR1 GENE CGG RPT BLD/T QL: NORMAL
GENE DIS ANL CARRIER INTERP-IMP: NORMAL
INDICATION: NORMAL
LAB DIRECTOR NAME PROVIDER: NORMAL
RECOMMENDATION PATIENT DOC-IMP: NORMAL
REF LAB TEST METHOD: NORMAL
SERVICE CMNT-IMP: NORMAL
SPECIMEN SOURCE: NORMAL

## 2024-03-07 ENCOUNTER — SOCIAL WORK (OUTPATIENT)
Dept: BEHAVIORAL/MENTAL HEALTH CLINIC | Facility: CLINIC | Age: 24
End: 2024-03-07

## 2024-03-07 DIAGNOSIS — F93.8: Primary | ICD-10-CM

## 2024-03-07 DIAGNOSIS — F33.40 RECURRENT MAJOR DEPRESSIVE DISORDER, IN REMISSION (HCC): ICD-10-CM

## 2024-03-07 NOTE — PSYCH
"Behavioral Health Psychotherapy Progress Note    Psychotherapy Provided: Individual Psychotherapy     1. Relationship problems specific to childhood and adolescence        2. Recurrent major depressive disorder, in remission (HCC)            Goals addressed in session: Goal 1     DATA: Kamilah came into the session on her own. Kamilah shared her husbands job has him working till 5pm. We discussed how she's been managing her pregnancy. She shared that overall she's doing well. We explored her primary trigger, she was able to identify how work has been her overall problem. Clinician actively listened to Kamilah and provided her with time and space to process her work experience. Clinician validated her experience and explored how she can improve how she manages conflicts. We identifies de-escalation and conflict resolutions skills. During this session, this clinician used the following therapeutic modalities: Engagement Strategies, Client-centered Therapy, Cognitive Behavioral Therapy, Cognitive Processing Therapy, Mindfulness-based Strategies, Motivational Interviewing, Solution-Focused Therapy, and Supportive Psychotherapy    Substance Abuse was not addressed during this session. If the client is diagnosed with a co-occurring substance use disorder, please indicate any changes in the frequency or amount of use: Kamilah denies substance abuse. Stage of change for addressing substance use diagnoses: No substance use/Not applicable    ASSESSMENT:  Kamilah Ortega presents with a Euthymic/ normal mood.     her affect is Normal range and intensity, which is congruent, with her mood and the content of the session. The client has made progress on their goals.    Kamilah denies suicidal/homicidal ideations or self-injurious behaviors.  Kamilah Ortega presents with a none risk of suicide, none risk of self-harm, and none risk of harm to others.    For any risk assessment that surpasses a \"low\" rating, a safety plan must be " developed.    A safety plan was indicated: no  If yes, describe in detail safety plan not indicated.    PLAN: Between sessions, Kamilah Ortega will utilize skills discussed to prepare herself for work, recite bible affirmations to help with the negativity. At the next session, the therapist will use Engagement Strategies, Client-centered Therapy, Cognitive Behavioral Therapy, Cognitive Processing Therapy, Mindfulness-based Strategies, Motivational Interviewing, Solution-Focused Therapy, and Supportive Psychotherapy to address her marriage and manage their triggers.    Behavioral Health Treatment Plan and Discharge Planning: Kamilah Ortega is aware of and agrees to continue to work on their treatment plan. They have identified and are working toward their discharge goals. yes    Visit start and stop times:    03/07/24  Start Time: 1400  Stop Time: 1455  Total Visit Time: 55 minutes

## 2024-03-15 LAB
CFTR FULL MUT ANL BLD/T SEQ: NORMAL
CITATION REF LAB TEST: NORMAL
CLINICAL INFO: NORMAL
ETHNIC BACKGROUND STATED: NORMAL
GENE DIS ANL CARRIER INTERP-IMP: NORMAL
INDICATION: NORMAL
LAB DIRECTOR NAME PROVIDER: NORMAL
RECOMMENDATION PATIENT DOC-IMP: NORMAL
REF LAB TEST METHOD: NORMAL
SERVICE CMNT-IMP: NORMAL
SPECIMEN SOURCE: NORMAL

## 2024-03-18 ENCOUNTER — ROUTINE PRENATAL (OUTPATIENT)
Dept: OBGYN CLINIC | Facility: CLINIC | Age: 24
End: 2024-03-18

## 2024-03-18 VITALS
WEIGHT: 136.6 LBS | SYSTOLIC BLOOD PRESSURE: 111 MMHG | HEIGHT: 61 IN | BODY MASS INDEX: 25.79 KG/M2 | DIASTOLIC BLOOD PRESSURE: 72 MMHG | HEART RATE: 86 BPM

## 2024-03-18 DIAGNOSIS — Z34.92 PRENATAL CARE IN SECOND TRIMESTER: Primary | ICD-10-CM

## 2024-03-18 DIAGNOSIS — Z3A.16 16 WEEKS GESTATION OF PREGNANCY: ICD-10-CM

## 2024-03-18 PROCEDURE — 99213 OFFICE O/P EST LOW 20 MIN: CPT | Performed by: NURSE PRACTITIONER

## 2024-03-18 NOTE — PATIENT INSTRUCTIONS
Thank you for your confidence in our team.   We appreciate you and welcome your feedback.   If you receive a survey from us, please take a few moments to let us know how we are doing.   Sincerely,  AL Staples       WARNING SIGNS DURING PREGNANCY  Call our office at [phone] for any of the followin. Vaginal bleeding  2. Sharp abdominal pain that does not go away  3. Fever (more than 100.4 and is not relieved by Tylenol)  4. Persistent vomiting lasting greater than 24 hours  5. Chest pain   6. Pain or burning when you urinate  7. Severe headache that doesn't resolve with Tylenol  8. Blurred vision or seeing spots in your vision  9. Sudden swelling of your face or hands  10. Redness, swelling or pain in a leg  11. A sudden weight gain in just a few days  12. Decrease in your baby's movement (after 28 weeks or the 6th month of pregnancy)  13. A loss of watery fluid from your vagina - can be a gush, a trickle or continuous wetness  14. After 20 weeks of pregnancy, rhythmic cramping (greater than 4 per hour) or menstrual like low/pelvic pain

## 2024-03-18 NOTE — PROGRESS NOTES
Kamilah Ortega presents today for routine OB visit at 16w2d.  Blood Pressure: 111/72  Wt=62 kg (136 lb 9.6 oz); Body mass index is 25.81 kg/m².; TWG=1.633 kg (3 lb 9.6 oz)  Fetal Heart Rate: 148  Abdomen: gravid, soft, non-tender.  She reports n/v mostly resolved now.  Denies uterine contractions or persistent cramping.  Denies vaginal bleeding or leaking of fluid.  Scheduled for ultrasound 4/5/24.  Reviewed common discomforts of pregnancy in second trimester and warning signs.  Advised to continue medications and return in 4 weeks.      Current Outpatient Medications   Medication Instructions    albuterol (ProAir HFA) 90 mcg/act inhaler 2 puffs, Inhalation, Every 6 hours PRN    aspirin (ECOTRIN LOW STRENGTH) 162 mg, Oral, Daily    ondansetron (ZOFRAN) 4 mg, Oral, Every 8 hours PRN    Prenatal Vit-Fe Fumarate-FA (Prenatal Plus Vitamin/Mineral) 27-1 MG TABS 1 tablet, Oral, Daily       Laboratory workup: initial OB labs (done 2/12/24)    Genetic Screening: NIPS negative, MSAFP ordered 3/18/24    Vaccinations: influenza (given 10/1/23); Tdap (will offer after 27 weeks); RSV (not indicated outside RSV season); COVID-19 (rec'd x2 doses in 2021 - will bring documentation)    Postpartum contraception: desires Mirena IUD    Fetal Ultrasounds:  1/15/24 (7w3d) EDC confirmed  2/21/24 (12w4d) NT WNL      G2 Problems (from 01/15/24 to present)       Problem Noted Resolved    Hx PTD @36w 2/21/2024 by AL Huffman No    Overview Signed 3/18/2024  4:46 PM by AL Staples     PROM @36w6d  Needs MFM consultation - done  Serial CL measurements         History of c/s x1 2/17/2024 by AL Staples No    Overview Signed 2/17/2024 11:04 AM by AL Staples     2021 Op note documents LTV uterine incision; indication breech presentation  Desires TOLAC         Family hx DM 2/17/2024 by AL Staples No    Overview Signed 2/17/2024 11:05 AM by AL Staples     Pt's mother  w/DM  Needs early GDM screen - done WNL         Asthma during pregnancy 2/17/2024 by AL Staples No    Overview Signed 2/17/2024 11:08 AM by AL Staples     Albuterol prn         Not immune to HBV 2/13/2024 by AL Staples No    Overview Addendum 2/17/2024 11:09 AM by AL Staples     Referred to PCP

## 2024-03-21 ENCOUNTER — TELEPHONE (OUTPATIENT)
Dept: PSYCHIATRY | Facility: CLINIC | Age: 24
End: 2024-03-21

## 2024-03-21 NOTE — TELEPHONE ENCOUNTER
Patient is calling regarding cancelling an appointment.    Date/Time: 3/21 at 2pm    Reason: No transportation    Patient was rescheduled: YES [x] NO []  If yes, when was Patient reschedule for: 5/7/24    Patient requesting call back to reschedule: YES [] NO [x]

## 2024-04-05 ENCOUNTER — ROUTINE PRENATAL (OUTPATIENT)
Dept: PERINATAL CARE | Facility: CLINIC | Age: 24
End: 2024-04-05
Payer: COMMERCIAL

## 2024-04-05 VITALS
SYSTOLIC BLOOD PRESSURE: 110 MMHG | DIASTOLIC BLOOD PRESSURE: 70 MMHG | WEIGHT: 141.2 LBS | HEART RATE: 90 BPM | HEIGHT: 61 IN | BODY MASS INDEX: 26.66 KG/M2

## 2024-04-05 DIAGNOSIS — O09.899 HISTORY OF PRETERM DELIVERY, CURRENTLY PREGNANT: ICD-10-CM

## 2024-04-05 DIAGNOSIS — Z3A.18 18 WEEKS GESTATION OF PREGNANCY: Primary | ICD-10-CM

## 2024-04-05 DIAGNOSIS — Z36.86 ENCOUNTER FOR ANTENATAL SCREENING FOR CERVICAL LENGTH: ICD-10-CM

## 2024-04-05 PROCEDURE — 76815 OB US LIMITED FETUS(S): CPT | Performed by: OBSTETRICS & GYNECOLOGY

## 2024-04-05 PROCEDURE — 76817 TRANSVAGINAL US OBSTETRIC: CPT | Performed by: OBSTETRICS & GYNECOLOGY

## 2024-04-05 PROCEDURE — 99213 OFFICE O/P EST LOW 20 MIN: CPT | Performed by: OBSTETRICS & GYNECOLOGY

## 2024-04-05 NOTE — PROGRESS NOTES
"[facility]: Kamilah Ortega was seen today for serial cervical length screening ultrasound.  See ultrasound report under \"OB Procedures\" tab.   The time spent on this established patient on the encounter date included 5 minutes previsit service time reviewing records and precharting, 12 minutes face-to-face service time counseling regarding results and coordinating care, and  8 minutes charting, totalling 25 minutes.  Please don't hesitate to contact our office with any concerns or questions.  -Kandi Cadena MD    "

## 2024-04-11 PROBLEM — Z3A.20 20 WEEKS GESTATION OF PREGNANCY: Status: ACTIVE | Noted: 2024-02-17

## 2024-04-15 NOTE — PROGRESS NOTES
OB/GYN Prenatal Visit    ASSESSMENT / PLAN:  1. 20 weeks gestation of pregnancy  Assessment & Plan:  Patient doing well with no OB complaints  Encouraged to obtain AFP, ordered previously  Planning for postplacental Mirena IUD  Patient will attend ultrasound appointment on 24  Return to clinic in 4 weeks      2. Prenatal care in second trimester    3. Not immune to HBV  Assessment & Plan:  Counseled patient that she may obtain 3 dose series at local pharmacy or PCP        SUBJECTIVE:  Kamilah Ortega is a 23 y.o.  at 20w4d here for prenatal visit.  She has no obstetric complaints and denies pelvic pain, cramping/contractions, vaginal bleeding, loss of fluid, and is feeling fetal movement    OBJECTIVE:  Vitals:    24 1425   BP: 108/64   Pulse: 90     FHT: 149 bpm  Fundal height: 21 cm    Physical Exam:    General: Well appearing, no distress  Respiratory: Unlabored breathing  Cardiovascular: Regular rate.  Abdomen: Soft, gravid, nontender  Fundal Height: Appropriate for gestational age.  Extremities: Warm and well perfused.  Non tender.      Pam Holloway MD  2024  2:37 PM

## 2024-04-15 NOTE — ASSESSMENT & PLAN NOTE
Patient doing well with no OB complaints  Encouraged to obtain AFP, ordered previously  Planning for postplacental Mirena IUD  Patient will attend ultrasound appointment on 4/23/24  Return to clinic in 4 weeks

## 2024-04-17 ENCOUNTER — ROUTINE PRENATAL (OUTPATIENT)
Dept: OBGYN CLINIC | Facility: CLINIC | Age: 24
End: 2024-04-17

## 2024-04-17 VITALS
BODY MASS INDEX: 26.98 KG/M2 | HEART RATE: 90 BPM | WEIGHT: 142.8 LBS | SYSTOLIC BLOOD PRESSURE: 108 MMHG | DIASTOLIC BLOOD PRESSURE: 64 MMHG

## 2024-04-17 DIAGNOSIS — Z3A.20 20 WEEKS GESTATION OF PREGNANCY: Primary | ICD-10-CM

## 2024-04-17 DIAGNOSIS — Z34.92 PRENATAL CARE IN SECOND TRIMESTER: ICD-10-CM

## 2024-04-17 DIAGNOSIS — Z78.9 NOT IMMUNE TO HEPATITIS B VIRUS: ICD-10-CM

## 2024-04-17 PROCEDURE — 99213 OFFICE O/P EST LOW 20 MIN: CPT | Performed by: OBSTETRICS & GYNECOLOGY

## 2024-04-23 ENCOUNTER — ROUTINE PRENATAL (OUTPATIENT)
Age: 24
End: 2024-04-23
Payer: COMMERCIAL

## 2024-04-23 VITALS
WEIGHT: 142.6 LBS | HEIGHT: 61 IN | BODY MASS INDEX: 26.92 KG/M2 | SYSTOLIC BLOOD PRESSURE: 108 MMHG | DIASTOLIC BLOOD PRESSURE: 60 MMHG | HEART RATE: 96 BPM

## 2024-04-23 DIAGNOSIS — Z36.3 ENCOUNTER FOR ANTENATAL SCREENING FOR MALFORMATIONS: ICD-10-CM

## 2024-04-23 DIAGNOSIS — O34.219 HISTORY OF CESAREAN DELIVERY, CURRENTLY PREGNANT: ICD-10-CM

## 2024-04-23 DIAGNOSIS — O09.899 HISTORY OF PRETERM DELIVERY, CURRENTLY PREGNANT: Primary | ICD-10-CM

## 2024-04-23 DIAGNOSIS — Z36.86 ENCOUNTER FOR ANTENATAL SCREENING FOR CERVICAL LENGTH: ICD-10-CM

## 2024-04-23 DIAGNOSIS — Z3A.21 21 WEEKS GESTATION OF PREGNANCY: ICD-10-CM

## 2024-04-23 PROCEDURE — 76805 OB US >/= 14 WKS SNGL FETUS: CPT | Performed by: OBSTETRICS & GYNECOLOGY

## 2024-04-23 PROCEDURE — 76817 TRANSVAGINAL US OBSTETRIC: CPT | Performed by: OBSTETRICS & GYNECOLOGY

## 2024-04-23 PROCEDURE — 99213 OFFICE O/P EST LOW 20 MIN: CPT | Performed by: OBSTETRICS & GYNECOLOGY

## 2024-04-23 NOTE — PROGRESS NOTES
Ultrasound Probe Disinfection    A transvaginal ultrasound was performed.   Prior to use, disinfection was performed with High Level Disinfection Process (Intiguaon).  Probe serial number S2: [id number] was used.      Gloria Wiley  04/23/24  2:33 PM

## 2024-04-23 NOTE — PROGRESS NOTES
"[facility]: Kamilah Ortega was seen today for anatomic survey and cervical length screening ultrasound.  See ultrasound report under \"OB Procedures\" tab.   The time spent on this established patient on the encounter date included 5 minutes previsit service time reviewing records and precharting, 5 minutes face-to-face service time counseling regarding results and coordinating care, and  5 minutes charting, totalling 15 minutes.  Please don't hesitate to contact our office with any concerns or questions.  -Kandi Cadena MD      "

## 2024-04-27 ENCOUNTER — NURSE TRIAGE (OUTPATIENT)
Dept: OTHER | Facility: OTHER | Age: 24
End: 2024-04-27

## 2024-04-27 NOTE — TELEPHONE ENCOUNTER
"Reason for Disposition  • Mild abdominal pain    Answer Assessment - Initial Assessment Questions  1. LOCATION: \"Where does it hurt?\"       Near belly button     2. RADIATION: \"Does the pain shoot anywhere else?\" (e.g., chest, back)      Denies    3. ONSET: \"When did the pain begin?\" (Minutes, hours or days ago)       This past Friday     4. ONSET: \"Gradual or sudden onset?\"      Gradual     5. PATTERN: \"Does the pain come and go, or has it been constant since it started?\"       Comes and goes    6. SEVERITY: \"How bad is the pain?\" \"What does it keep you from doing?\"  (e.g., Scale 1-10; mild, moderate, or severe)    - MILD (1-3): doesn't interfere with normal activities, abdomen soft and not tender to touch     - MODERATE (4-7): interferes with normal activities or awakens from sleep, tender to touch     - SEVERE (8-10): excruciating pain, doubled over, unable to do any normal activities      2/10    7. RECURRENT SYMPTOM: \"Have you ever had this type of stomach pain before?\" If Yes, ask: \"When was the last time?\" and \"What happened that time?\"         8. CAUSE: \"What do you think is causing the stomach pain?      Unknown     9. RELIEVING/AGGRAVATING FACTORS: \"What makes it better or worse?\" (e.g., antacids, bowel movement, movement)      Bending and moving makes the pain worse     10. FETAL MOVEMENT: \"Has the baby's movement decreased or changed significantly from normal?\"        Denies    11. OTHER SYMPTOMS: \"Has there been any vaginal bleeding, fever, vomiting, diarrhea, or urine problems?\"        Denies    12. NOAH: \"What date are you expecting to deliver?\"        8/3/24    Had bowel movement today. Patient has been active today moving and packing. Has not been drinking a lot of fluids.   Advised patient to rest, drink fluids and try tylenol. Continue with kick counts. If no improvement or symptoms worsen to call back. Patient verbalized understanding.    Protocols used: Pregnancy - Abdominal Pain Greater Than 20 " Weeks EGA-ADULT-AH

## 2024-04-27 NOTE — TELEPHONE ENCOUNTER
"Regardinw pregnant / pain near belly button  ----- Message from Smitha Trujillo sent at 2024  4:36 PM EDT -----  \" I am 22w pregnant and I am having small pinches of pain right where my belly button is. \"    "

## 2024-05-07 ENCOUNTER — SOCIAL WORK (OUTPATIENT)
Dept: BEHAVIORAL/MENTAL HEALTH CLINIC | Facility: CLINIC | Age: 24
End: 2024-05-07

## 2024-05-07 ENCOUNTER — ROUTINE PRENATAL (OUTPATIENT)
Age: 24
End: 2024-05-07
Payer: COMMERCIAL

## 2024-05-07 VITALS
WEIGHT: 145 LBS | BODY MASS INDEX: 27.38 KG/M2 | SYSTOLIC BLOOD PRESSURE: 116 MMHG | HEIGHT: 61 IN | HEART RATE: 103 BPM | DIASTOLIC BLOOD PRESSURE: 70 MMHG

## 2024-05-07 DIAGNOSIS — F93.8: Primary | ICD-10-CM

## 2024-05-07 DIAGNOSIS — F33.40 RECURRENT MAJOR DEPRESSIVE DISORDER, IN REMISSION (HCC): ICD-10-CM

## 2024-05-07 DIAGNOSIS — Z3A.23 23 WEEKS GESTATION OF PREGNANCY: ICD-10-CM

## 2024-05-07 DIAGNOSIS — O09.899 HISTORY OF PRETERM DELIVERY, CURRENTLY PREGNANT: Primary | ICD-10-CM

## 2024-05-07 DIAGNOSIS — O34.219 HISTORY OF CESAREAN DELIVERY, CURRENTLY PREGNANT: ICD-10-CM

## 2024-05-07 PROCEDURE — 99213 OFFICE O/P EST LOW 20 MIN: CPT | Performed by: OBSTETRICS & GYNECOLOGY

## 2024-05-07 PROCEDURE — 76817 TRANSVAGINAL US OBSTETRIC: CPT | Performed by: OBSTETRICS & GYNECOLOGY

## 2024-05-07 PROCEDURE — 76816 OB US FOLLOW-UP PER FETUS: CPT | Performed by: OBSTETRICS & GYNECOLOGY

## 2024-05-07 NOTE — PROGRESS NOTES
Ultrasound Probe Disinfection    A transvaginal ultrasound was performed.   Prior to use, disinfection was performed with High Level Disinfection Process (Tritonon).  Probe serial number S2: [id number] was used.      Jeanette Rubin  05/07/24  2:01 PM

## 2024-05-07 NOTE — LETTER
May 7, 2024     AL Staples  [address] [address]    Patient: Kamilah Ortega   YOB: 2000   Date of Visit: 2024       Dear Dr. Mack:    Thank you for referring Kamilah Ortega to me for evaluation. Below are my notes for this consultation.    If you have questions, please do not hesitate to call me. I look forward to following your patient along with you.         Sincerely,        Saskia Do MD        CC: No Recipients    Saskia Do MD  2024  2:54 PM  Sign when Signing Visit  Kamilah Ortega  is 23w3d.  She reports she was told that she had a hernia since when she was younger in the lower left groin area.  She reports that at times she is feeling a bulge but not currently.  At times when it is bulging it is more uncomfortable but not to the point where she feels she needs pain medication.  She does report that this pain sometimes radiates to her left hip. She reports fetal movements and does not report any vaginal bleeding or signs of labor.  She is here today for an ultrasound for cervical length and missed anatomy.    Problem list:  Prior 36 weeks 6-day  delivery  History of a  section indicated secondary to breech presentation and desires a TOLAC    Ultrasound findings:  The ultrasound today shows a normal cervical length and the prior missed anatomy was reviewed and appears normal.  This completes the anatomical survey.    Pregnancy ultrasound has limitations and is unable to detect all forms of fetal congenital abnormalities.      Counseling:  She is not sure type of hernia she has whether this is inguinal or femoral.  Currently she is not feeling significant discomfort or a bulge.  We discussed that hernias are defects in the fascia that can cause a loop of bowel to get stuck in the defect and then over time it swells and the blood supply to this loop of bowel is impacted and can cause the loop of bowel to  infarct or burst.  This can cause symptoms of an acute abdomen which can cause her to have intense pain that she would want pain medication for and would want to come to the emergency room for.  It also can be associated with nausea and vomiting.  If she does have an inguinal or femoral hernia that reduces itself then this does not require surgery during pregnancy but may be an option after pregnancy.  Previously Dr. Gary had recommended a maternity belt to give support to this area which she will look into.     The pain in her left hip may or may not be related.  If this is worsening over time she may want to discuss with her OB providers about getting a physical therapy referral.  She may also discuss with them about possible work limitations.  She is not interested in work cessation.     Follow up recommended:   Recommend a follow-up ultrasound for growth which is previously scheduled at 32 weeks.     Pre visit time reviewing her records   5 minutes  Face to face time 8 minutes  Post visit time on documentation of note, updating her problem list, adding orders and prescriptions 10  minutes.  Procedures that were completed today were charged separately.   The level of decision making was low level complexity.    Saskia Do MD

## 2024-05-07 NOTE — PROGRESS NOTES
Kamilah Ortega  is 23w3d.  She reports she was told that she had a hernia since when she was younger in the lower left groin area.  She reports that at times she is feeling a bulge but not currently.  At times when it is bulging it is more uncomfortable but not to the point where she feels she needs pain medication.  She does report that this pain sometimes radiates to her left hip. She reports fetal movements and does not report any vaginal bleeding or signs of labor.  She is here today for an ultrasound for cervical length and missed anatomy.    Problem list:  Prior 36 weeks 6-day  delivery  History of a  section indicated secondary to breech presentation and desires a TOLAC    Ultrasound findings:  The ultrasound today shows a normal cervical length and the prior missed anatomy was reviewed and appears normal.  This completes the anatomical survey.    Pregnancy ultrasound has limitations and is unable to detect all forms of fetal congenital abnormalities.      Counseling:  She is not sure type of hernia she has whether this is inguinal or femoral.  Currently she is not feeling significant discomfort or a bulge.  We discussed that hernias are defects in the fascia that can cause a loop of bowel to get stuck in the defect and then over time it swells and the blood supply to this loop of bowel is impacted and can cause the loop of bowel to infarct or burst.  This can cause symptoms of an acute abdomen which can cause her to have intense pain that she would want pain medication for and would want to come to the emergency room for.  It also can be associated with nausea and vomiting.  If she does have an inguinal or femoral hernia that reduces itself then this does not require surgery during pregnancy but may be an option after pregnancy.  Previously Dr. Gary had recommended a maternity belt to give support to this area which she will look into.     The pain in her left hip may or may not be related.   If this is worsening over time she may want to discuss with her OB providers about getting a physical therapy referral.  She may also discuss with them about possible work limitations.  She is not interested in work cessation.     Follow up recommended:   Recommend a follow-up ultrasound for growth which is previously scheduled at 32 weeks.     Pre visit time reviewing her records   5 minutes  Face to face time 8 minutes  Post visit time on documentation of note, updating her problem list, adding orders and prescriptions 10  minutes.  Procedures that were completed today were charged separately.   The level of decision making was low level complexity.    Saskia Do MD

## 2024-05-07 NOTE — BH TREATMENT PLAN
"Outpatient Behavioral Health Psychotherapy Treatment Plan    Kamilah Ortega  2000     Date of Initial Psychotherapy Assessment: 11/21/23   Date of Current Treatment Plan: 05/07/24  Treatment Plan Target Date: 11/7/24  Treatment Plan Expiration Date: TBD    Diagnosis:   1. Relationship problems specific to childhood and adolescence        2. Recurrent major depressive disorder, in remission (HCC)              Area(s) of Need: They report to experience misunderstandings, lack of respect and difficulties with communication.     Long Term Goal 1 (in the client's own words): Xavier state, \"We want to be better individually and have a good marriage.\"  5/7/24: There has been progress individually although we need to work on our anger, but there is room for improvement as a couple. We need to invest more as a  couple.     Stage of Change: Action    Target Date for completion: 11/7/24     Anticipated therapeutic modalities: Engagement Strategies, Client-centered Therapy, Cognitive Behavioral Therapy, Cognitive Processing Therapy, Family Therapy, Mindfulness-based Strategies, Motivational Interviewing, Music Therapy Techniques, Solution-Focused Therapy, Supportive Psychotherapy, Psycho-Education, and Couples Counseling.     People identified to complete this goal: Bethel Damon and Rubina Stock, MS, LPC, NCC      Objective 1: (identify the means of measuring success in meeting the objective): Explore relationship issues and identifying origins      Objective 2: (identify the means of measuring success in meeting the objective): Be able to express anger without yelling and using foul language    Objective 3: (identify the means of measuring success in meeting the objective): Learning and applying active listening skill       We am currently under the care of a [facility] psychiatric provider: no    My [facility] psychiatric provider is: N/A    We am currently taking psychiatric " "medications: No    we feel that we will be ready for discharge from mental health care when we reach the following (measurable goal/objective): Xavier states, \"When we are taking action to our goals. \"    For children and adults who have a legal guardian:   Has there been any change to custody orders and/or guardianship status? NA. If yes, attach updated documentation.    I have created my Crisis Plan and have been offered a copy of this plan    Behavioral Health Treatment Plan [facility]: Diagnosis and Treatment Plan explained to Kamilah Ortega acknowledges an understanding of their diagnosis. Kamilah Ortega agrees to this treatment plan.    I have been offered a copy of this Treatment Plan. yes        "

## 2024-05-07 NOTE — PSYCH
Behavioral Health Psychotherapy Progress Note    Psychotherapy Provided: Individual Psychotherapy     1. Relationship problems specific to childhood and adolescence        2. Recurrent major depressive disorder, in remission (HCC)            Goals addressed in session: Goal 1     DATA: Kamilah shared her relationship has been tumultuous over a month ago, she shared that they appeared to have given up. She shared the lack of respect has been the primary cause for her not wanting to continue living with Bethel. Clinician validated her feelings and discussed how her behavior helped to remind Bethel her need for him to be more consistent. She shared how communicating her needs and concerns and proving that she is willing to move out on her own has made recognize his need for change. Kamilah has recognized the change Bethel has made, and was able to list the changes she's seen. She however reports at times she's prepared for the worst from him. We discussed how her fear of him prepared for things to get worse and remember the negatives has conditioned her to respond negatively. We discussed the importance of highlighting and reminding herself of the positive. Clinician also provided a list of affirmations she can read to help with conditioning her mindset on more positive. We discussed and reviewed their goals and objectives and updated her treatment plan. During this session, this clinician used the following therapeutic modalities: Engagement Strategies, Client-centered Therapy, Cognitive Behavioral Therapy, Cognitive Processing Therapy, Mindfulness-based Strategies, Motivational Interviewing, Solution-Focused Therapy, and Supportive Psychotherapy    Substance Abuse was not addressed during this session. If the client is diagnosed with a co-occurring substance use disorder, please indicate any changes in the frequency or amount of use: Kamilah denies substance abuse. Stage of change for addressing substance use diagnoses:  "No substance use/Not applicable    ASSESSMENT:  Kamilah Ortega presents with a Euthymic/ normal mood.     her affect is Normal range and intensity, which is congruent, with her mood and the content of the session. The client has made progress on their goals.    Kamilah denies any current suicidal/homicidal ideations or self-injurious behaviors. She reports however in the past month to have experienced morbid thoughts. She reports her children have been her preventative factor, and was able to utilize her support system(mom) to help with prevention. Kamilah Ortega presents with a minimal risk of suicide, none risk of self-harm, and none risk of harm to others.    For any risk assessment that surpasses a \"low\" rating, a safety plan must be developed.    A safety plan was indicated: no  If yes, describe in detail safety plan not indicated.    PLAN: Between sessions, Kamilah Ortega will utilize the bible affirmations to help with regulating her mood. At the next session, the therapist will use Engagement Strategies, Client-centered Therapy, Cognitive Behavioral Therapy, Cognitive Processing Therapy, Mindfulness-based Strategies, Motivational Interviewing, Solution-Focused Therapy, and Supportive Psychotherapy to address their mood and anger and the marriage.    Behavioral Health Treatment Plan and Discharge Planning: Kamilah Ortega is aware of and agrees to continue to work on their treatment plan. They have identified and are working toward their discharge goals. yes    Visit start and stop times:    05/07/24  Start Time: 1452  Stop Time: 1552  Total Visit Time: 60 minutes  "

## 2024-05-13 ENCOUNTER — ROUTINE PRENATAL (OUTPATIENT)
Dept: OBGYN CLINIC | Facility: CLINIC | Age: 24
End: 2024-05-13

## 2024-05-13 VITALS
HEART RATE: 98 BPM | WEIGHT: 148 LBS | BODY MASS INDEX: 27.94 KG/M2 | SYSTOLIC BLOOD PRESSURE: 109 MMHG | DIASTOLIC BLOOD PRESSURE: 69 MMHG | HEIGHT: 61 IN

## 2024-05-13 DIAGNOSIS — Z3A.24 24 WEEKS GESTATION OF PREGNANCY: Primary | ICD-10-CM

## 2024-05-13 DIAGNOSIS — Z34.92 PRENATAL CARE IN SECOND TRIMESTER: ICD-10-CM

## 2024-05-13 PROCEDURE — 99213 OFFICE O/P EST LOW 20 MIN: CPT | Performed by: NURSE PRACTITIONER

## 2024-05-13 NOTE — LETTER
2024    To Kamilah Ortega  : 2000      To whom it may concern,    Kamilah Ortega is under our care for her pregnancy.  We recommend that all pregnant women:   1. Have a well-ventilated workspace.   2. Wear low-heeled shoes.   3. Work no more than 40 hours per week.   4. Have a 15-minute break every 2 hours and at least 30 minutes for a meal.   5. Lift no more than 20 pounds without assistance.   6. Have ready access to bathrooms and water.   7. Should not be climbing ladders.    She may continue to work until her due date (Estimated Date of Delivery: 24) unless medical complications arise sooner.  We anticipate she may return to work in 6-8 weeks after delivery.    Please contact our office with any questions or concerns.    Sincerely,  AL Staples

## 2024-05-13 NOTE — PATIENT INSTRUCTIONS
Thank you for your confidence in our team.   We appreciate you and welcome your feedback.   If you receive a survey from us, please take a few moments to let us know how we are doing.   Sincerely,  AL Staples       WARNING SIGNS DURING PREGNANCY  Call our office at [phone] for any of the followin. Vaginal bleeding  2. Sharp abdominal pain that does not go away  3. Fever (more than 100.4 and is not relieved by Tylenol)  4. Persistent vomiting lasting greater than 24 hours  5. Chest pain   6. Pain or burning when you urinate  7. Severe headache that doesn't resolve with Tylenol  8. Blurred vision or seeing spots in your vision  9. Sudden swelling of your face or hands  10. Redness, swelling or pain in a leg  11. A sudden weight gain in just a few days  12. Decrease in your baby's movement (after 28 weeks or the 6th month of pregnancy)  13. A loss of watery fluid from your vagina - can be a gush, a trickle or continuous wetness  14. After 20 weeks of pregnancy, rhythmic cramping (greater than 4 per hour) or menstrual like low/pelvic pain    normal...

## 2024-05-13 NOTE — PROGRESS NOTES
Kamilah Ortega presents today for routine OB visit at 24w2d.  Blood Pressure: 109/69  Wt=67.1 kg (148 lb); Body mass index is 27.96 kg/m².; TWG=6.804 kg (15 lb)  Fetal Heart Rate: 149; Fundal Height (cm): 25 cm  Abdomen: gravid, soft, non-tender.  She reports discomfort from L inguinal vs femoral hernia off/on - especially when lifting heavy objects at work.  Advised use of maternity belt which she has yet to obtain  Given activity restriction letter for work restricting lifting no more than 20 lbs without assistance.  Also c/o L hip pain off/on without radiation.  Offered physical therapy but she declines.  Denies uterine contractions or persistent cramping.  Denies vaginal bleeding or leaking of fluid.  Reports fetal movement.  Scheduled for ultrasound 7/9/24.  Reviewed common discomforts of pregnancy in second trimester and warning signs.  Advised to continue medications and return in 4 weeks.      Current Outpatient Medications   Medication Instructions    albuterol (ProAir HFA) 90 mcg/act inhaler 2 puffs, Inhalation, Every 6 hours PRN    Prenatal Vit-Fe Fumarate-FA (Prenatal Plus Vitamin/Mineral) 27-1 MG TABS 1 tablet, Oral, Daily       Laboratory workup: initial OB labs (done 2/12/24)    Genetic Screening: NIPS negative, MSAFP not done    Vaccinations: influenza (given 10/1/23); Tdap (will offer after 27 weeks); RSV (not indicated outside RSV season); COVID-19 (rec'd x2 doses in 2021 - will bring documentation)    Postpartum contraception: desires Mirena IUD    Fetal Ultrasounds:  1/15/24 (7w3d) EDC confirmed  2/21/24 (12w4d) NT WNL  4/5/24 (18w6d) CL=3.86cm  4/23/24 (21w3d) no previa, CL=3.67cm, tiffany WNL w/missed views  5/7/24 (23w3d) CL=4.19cm, tiffany WNL & complete      G2 Problems (from 01/15/24 to present)       Problem Noted Resolved    Hx PTD @36w 2/21/2024 by AL Huffman No    Overview Addendum 5/13/2024  3:39 PM by AL Staples     PROM @36w6d  Needs M consultation -  done  Serial CL measurements - done WNL         History of c/s x1 2/17/2024 by AL Staples No    Overview Signed 2/17/2024 11:04 AM by AL Staples     2021 Op note documents LTV uterine incision; indication breech presentation  Desires TOLAC         Family hx DM 2/17/2024 by AL Staples No    Overview Signed 2/17/2024 11:05 AM by AL Staples     Pt's mother w/DM  Needs early GDM screen - done WNL         Asthma during pregnancy 2/17/2024 by AL Staples No    Overview Signed 2/17/2024 11:08 AM by AL Staples     Albuterol prn         Not immune to HBV 2/13/2024 by AL Staples No    Overview Addendum 5/13/2024  3:39 PM by AL Staples     Needs HBV vaccine booster

## 2024-05-19 ENCOUNTER — OFFICE VISIT (OUTPATIENT)
Dept: URGENT CARE | Facility: MEDICAL CENTER | Age: 24
End: 2024-05-19
Payer: COMMERCIAL

## 2024-05-19 VITALS
DIASTOLIC BLOOD PRESSURE: 57 MMHG | HEART RATE: 96 BPM | TEMPERATURE: 98.9 F | SYSTOLIC BLOOD PRESSURE: 112 MMHG | RESPIRATION RATE: 18 BRPM | OXYGEN SATURATION: 100 %

## 2024-05-19 DIAGNOSIS — M76.891 RIGHT HIP TENDINITIS: Primary | ICD-10-CM

## 2024-05-19 PROCEDURE — 99213 OFFICE O/P EST LOW 20 MIN: CPT | Performed by: FAMILY MEDICINE

## 2024-05-19 NOTE — PATIENT INSTRUCTIONS
Gait is antalgic.  Otherwise patient exhibits limited range of motion secondary to pain.  Recommended patient follow through with her obstetrician's recommendation of undergoing physical therapy.  Also recommended patient attempt to try ice for 10 to 15 minutes.  If no improvement switch to heating pads for 10 to 15 minutes as needed.  Letter for work given.    Tendinitis   WHAT YOU NEED TO KNOW:   Tendinitis is painful inflammation or breakdown of your tendons. It may also be called tendinopathy. Tendinitis often occurs in the knee, shoulder, ankle, hip, or elbow.  DISCHARGE INSTRUCTIONS:   Return to the emergency department if:   You have increased redness over the joint, or swelling in the joint.    You suddenly cannot move your joint.    Call your doctor if:   You have increased pain even after you take medicine.    You have questions or concerns about your condition or care.    Medicines:   Acetaminophen  decreases pain and fever. It is available without a doctor's order. Ask how much to take and how often to take it. Follow directions. Read the labels of all other medicines you are using to see if they also contain acetaminophen, or ask your doctor or pharmacist. Acetaminophen can cause liver damage if not taken correctly.    NSAIDs , such as ibuprofen, help decrease swelling, pain, and fever. This medicine is available with or without a doctor's order. NSAIDs can cause stomach bleeding or kidney problems in certain people. If you take blood thinner medicine, always ask your healthcare provider if NSAIDs are safe for you. Always read the medicine label and follow directions.    Take your medicine as directed.  Contact your healthcare provider if you think your medicine is not helping or if you have side effects. Tell your provider if you are allergic to any medicine. Keep a list of the medicines, vitamins, and herbs you take. Include the amounts, and when and why you take them. Bring the list or the pill  bottles to follow-up visits. Carry your medicine list with you in case of an emergency.    Manage tendinitis:   Rest your tendon  as directed to help it heal. Ask your healthcare provider if you need to stop putting weight on your affected area.    Apply ice  to help decrease swelling and pain. Use an ice pack, or put crushed ice in a plastic bag. Cover the bag with a towel before you place it on the affected area. Apply ice for 10 to 15 minutes every hour, or as directed.    Use a support device , such as a cane, splint, shoe insert, or brace. A support device may help reduce your pain.    Go to physical therapy  if directed. A physical therapist can teach you exercises to help improve movement and strength, and to decrease pain. You may also learn how to improve your posture, and how to lift or exercise correctly.    Prevent tendinitis:   Warm up and cool down when you exercise.  Run in place slowly or walk at a brisk pace to warm your muscles before you exercise. When you finish exercising, walk for a few minutes to cool down.         Exercise regularly  to strengthen the muscles around your joint. Ease into an exercise routine for the first 3 weeks to prevent another injury. Ask your healthcare provider about the best exercise plan for you. Rest fully between activities.    Use the right equipment  for sports and exercise. Wear braces or tape around weak joints as directed.    Follow up with your doctor as directed:  Write down your questions so you remember to ask them during your visits.  © Copyright [employer] 2023 Information is for End User's use only and may not be sold, redistributed or otherwise used for commercial purposes.  The above information is an  only. It is not intended as medical advice for individual conditions or treatments. Talk to your doctor, nurse or pharmacist before following any medical regimen to see if it is safe and effective for you.

## 2024-05-19 NOTE — LETTER
May 19, 2024     Patient: Kamilah Ortega   YOB: 2000   Date of Visit: 5/19/2024       To Whom It May Concern:    It is my medical opinion that Kamilah Ortega may return to work on 5/21/2024 .    If you have any questions or concerns, please don't hesitate to call.         Sincerely,        Darrin Castellano MD    CC: No Recipients

## 2024-05-19 NOTE — PROGRESS NOTES
[facility]        NAME: Kamilah Ortega is a 23 y.o. female  : 2000    MRN: [id number]  DATE: May 19, 2024  TIME: 9:06 AM    Assessment and Plan   Right hip tendinitis [M76.891]  1. Right hip tendinitis              Patient Instructions       Follow up with PCP in 3-5 days.  Proceed to  ER if symptoms worsen.    If tests have been performed at [facility], our office will contact you with results if changes need to be made to the care plan discussed with you at the visit.  You can review your full results on [facility] MyChart.    Chief Complaint     Chief Complaint   Patient presents with    Hip Pain     Patient c/o right side hip pain that radiates down her right leg. Patient is currently 25 week pregnant.          History of Present Illness       23-year-old female here today with complaint of right-sided hip pain for the past 2 months.  Denies any recent fall or trauma.  Pain begins in the outer hip and sometimes radiate into the right leg.  Denies any history of pinched nerve in the past.  Denies weakness of the lower extremity.  It was recommended by her obstetrician that she undergo physical therapy but she has not done so so far.  She is currently taking no pain medication nor is she applying ice or heat.  Walking or standing for prolonged period of time seems to aggravate symptoms.    Hip Pain         Review of Systems   Review of Systems   Musculoskeletal:  Positive for arthralgias.   Neurological: Negative.          Current Medications       Current Outpatient Medications:     albuterol (ProAir HFA) 90 mcg/act inhaler, Inhale 2 puffs every 6 (six) hours as needed for wheezing, Disp: 8 g, Rfl: 3    Prenatal Vit-Fe Fumarate-FA (Prenatal Plus Vitamin/Mineral) 27-1 MG TABS, Take 1 tablet by mouth in the morning, Disp: 90 tablet, Rfl: 4    Current Allergies     Allergies as of 2024 - Reviewed 2024   Allergen Reaction Noted    Fish-derived products - food allergy  2017     Isoflavones (soy)  2017    Peanut oil - food allergy  2017            The following portions of the patient's history were reviewed and updated as appropriate: allergies, current medications, past family history, past medical history, past social history, past surgical history and problem list.     Past Medical History:   Diagnosis Date    Asthma     Depression        Past Surgical History:   Procedure Laterality Date    [place]  DELIVERY ONLY N/A 2021    Procedure:  SECTION ();  Surgeon: Sari SAPP MD;  Location: [facility];  Service: Obstetrics       Family History   Problem Relation Age of Onset    Diabetes Mother     Fibromyalgia Mother     Hypertension Mother     Anemia Mother     Hypertension Father     Asthma Sister     Schizophrenia Brother     Cancer Maternal Grandmother     Breast cancer Neg Hx     Colon cancer Neg Hx     Ovarian cancer Neg Hx          Medications have been verified.        Objective   /57   Pulse 96   Temp 98.9 °F (37.2 °C)   Resp 18   LMP 2023 Comment: US estimates 5 weeks 1 day gestation  SpO2 100%   Patient's last menstrual period was 2023.       Physical Exam     Physical Exam  Constitutional:       Appearance: Normal appearance.   Musculoskeletal:         General: Tenderness present.      Comments: Left hip: Full range on flexion extension and abduction at the hip some tenderness elicited on internal rotation of the left lower extremity.  Otherwise good strength and tone left leg.  Right hip: Flexion limited to 45 degrees extension to 0 degrees, abduction 20 degrees internal rotation limited to 15 degrees.  Some tenderness elicited over the palpation of the right hip.  Otherwise good strength and tone of the right leg.  Gait-antalgic.   Neurological:      Mental Status: She is alert.

## 2024-05-22 ENCOUNTER — TELEMEDICINE (OUTPATIENT)
Dept: BEHAVIORAL/MENTAL HEALTH CLINIC | Facility: CLINIC | Age: 24
End: 2024-05-22

## 2024-05-22 DIAGNOSIS — F33.40 RECURRENT MAJOR DEPRESSIVE DISORDER, IN REMISSION (HCC): ICD-10-CM

## 2024-05-22 DIAGNOSIS — F93.8: Primary | ICD-10-CM

## 2024-05-22 NOTE — PSYCH
Virtual Regular Visit    Verification of patient location:    Patient is located at Other in the following state in which I hold an active license [place]      Assessment/Plan:    Problem List Items Addressed This Visit    None  Visit Diagnoses       Relationship problems specific to childhood and adolescence    -  Primary    Recurrent major depressive disorder, in remission (HCC)                Goals addressed in session: Goal 1          Reason for visit is No chief complaint on file.       Encounter provider Rubina Stock LPC      Recent Visits  No visits were found meeting these conditions.  Showing recent visits within past 7 days and meeting all other requirements  Today's Visits  Date Type Provider Dept   05/22/24 Telemedicine Rubina Stock LPC [facility] Therapist [address]   Showing today's visits and meeting all other requirements  Future Appointments  No visits were found meeting these conditions.  Showing future appointments within next 150 days and meeting all other requirements       The patient was identified by name and date of birth. Kamilah Ortega was informed that this is a telemedicine visit and that the visit is being conducted throughthe Epic Embedded platform. She agrees to proceed..  My office door was closed. No one else was in the room.  She acknowledged consent and understanding of privacy and security of the video platform. The patient has agreed to participate and understands they can discontinue the visit at any time.    Patient is aware this is a billable service.     Subjective  Kamilah Ortega is a 23 y.o. female Behavioral Health Psychotherapy Progress Note    Psychotherapy Provided: Individual Psychotherapy     1. Relationship problems specific to childhood and adolescence        2. Recurrent major depressive disorder, in remission (HCC)            Goals addressed in session: Goal 1     DATA: Kamilah and Bethel were present today's session, and clinician commended them both  for being present. Kamilah shared how much progress she's had with Bethel and communicating her needs. She reports to have been making efforts to utilize the active listening skills, but has forgotten some of the pointers. Bethel indicates he's been focusing on work, and taking care of his household. He indicates being on their own has helped with improving his marriage. Bethel had to leave for work after 15 minutes of our session. Kamilah shared she's been making every effort to check in with herself and catching herself before she impulsively reacts to Bethel. She however feels as though he isnt validating or acknowledging her feelings. We also identified that her  wants her to be a stay at home mom. She reports to be fearful that he will take advantage that he'll be the sole breadwinner and will become a womanizer. We discussed the importance of communicating her needs and fears. We also explored possible alternative solutions to her fears, and discussed the possibility of her fears actually happening. We discussed regulating her emotions to help with being more rational and the importance of self-care. We discussed how certain stories in the bible highlighted and encouraged her to have may. We identified how prayer has been needed for her and we discussed how prayer can and has helped in the past. During this session, this clinician used the following therapeutic modalities: Biblical Counseling, Engagement Strategies, Client-centered Therapy, Cognitive Behavioral Therapy, Cognitive Processing Therapy, Mindfulness-based Strategies, Motivational Interviewing, Solution-Focused Therapy, Supportive Psychotherapy, and Couples Counseling    Substance Abuse was not addressed during this session. If the client is diagnosed with a co-occurring substance use disorder, please indicate any changes in the frequency or amount of use: Substance abuse denied. Stage of change for addressing substance use diagnoses: No  "substance use/Not applicable    ASSESSMENT:  Kamilah Ortega presents with a Euthymic/ normal mood.     her affect is Normal range and intensity, which is congruent, with her mood and the content of the session. The client has made progress on their goals.    Kamilah denies suicidal/homicidal ideations or self-injurious behaviors. Kamilah Ortega presents with a none risk of suicide, none risk of self-harm, and none risk of harm to others.    For any risk assessment that surpasses a \"low\" rating, a safety plan must be developed.    A safety plan was indicated: no  If yes, describe in detail safety plan    PLAN: Between sessions, Kamilah Ortega will follow the communication pointers discussed and write down her prayers. At the next session, the therapist will use Biblical Counseling, Engagement Strategies, Client-centered Therapy, Cognitive Behavioral Therapy, Cognitive Processing Therapy, Mindfulness-based Strategies, Motivational Interviewing, Solution-Focused Therapy, Supportive Psychotherapy, and Couples Counseling  to address the progress they've had with actively listening to each other.    Behavioral Health Treatment Plan and Discharge Planning: Kamilah Ortega is aware of and agrees to continue to work on their treatment plan. They have identified and are working toward their discharge goals. yes    Visit start and stop times:    24  Start Time: 1400  Stop Time: 1455  Total Visit Time: 55 minutes .      HPI     Past Medical History:   Diagnosis Date    Asthma     Depression        Past Surgical History:   Procedure Laterality Date    [place]  DELIVERY ONLY N/A 2021    Procedure:  SECTION ();  Surgeon: Sari SAPP MD;  Location: [facility];  Service: Obstetrics       Current Outpatient Medications   Medication Sig Dispense Refill    albuterol (ProAir HFA) 90 mcg/act inhaler Inhale 2 puffs every 6 (six) hours as needed for wheezing 8 g 3    Prenatal Vit-Fe Fumarate-FA " (Prenatal Plus Vitamin/Mineral) 27-1 MG TABS Take 1 tablet by mouth in the morning 90 tablet 4     No current facility-administered medications for this visit.        Allergies   Allergen Reactions    Fish-Derived Products - Food Allergy     Isoflavones (Soy)     Peanut Oil - Food Allergy        Review of Systems    Video Exam    There were no vitals filed for this visit.    Physical Exam

## 2024-06-10 PROBLEM — Z3A.28 28 WEEKS GESTATION OF PREGNANCY: Status: ACTIVE | Noted: 2024-02-17

## 2024-06-11 ENCOUNTER — ROUTINE PRENATAL (OUTPATIENT)
Dept: OBGYN CLINIC | Facility: CLINIC | Age: 24
End: 2024-06-11

## 2024-06-11 VITALS
HEART RATE: 91 BPM | BODY MASS INDEX: 29.19 KG/M2 | DIASTOLIC BLOOD PRESSURE: 64 MMHG | HEIGHT: 61 IN | SYSTOLIC BLOOD PRESSURE: 103 MMHG | WEIGHT: 154.6 LBS

## 2024-06-11 DIAGNOSIS — Z3A.28 28 WEEKS GESTATION OF PREGNANCY: ICD-10-CM

## 2024-06-11 DIAGNOSIS — Z23 ENCOUNTER FOR IMMUNIZATION: ICD-10-CM

## 2024-06-11 DIAGNOSIS — K21.9 GASTROESOPHAGEAL REFLUX DISEASE WITHOUT ESOPHAGITIS: ICD-10-CM

## 2024-06-11 DIAGNOSIS — K21.9 GERD WITHOUT ESOPHAGITIS: ICD-10-CM

## 2024-06-11 DIAGNOSIS — Z34.93 PRENATAL CARE IN THIRD TRIMESTER: Primary | ICD-10-CM

## 2024-06-11 PROCEDURE — 90715 TDAP VACCINE 7 YRS/> IM: CPT | Performed by: OBSTETRICS & GYNECOLOGY

## 2024-06-11 PROCEDURE — 99213 OFFICE O/P EST LOW 20 MIN: CPT | Performed by: OBSTETRICS & GYNECOLOGY

## 2024-06-11 PROCEDURE — 90471 IMMUNIZATION ADMIN: CPT | Performed by: OBSTETRICS & GYNECOLOGY

## 2024-06-11 RX ORDER — FAMOTIDINE 20 MG/1
20 TABLET, FILM COATED ORAL DAILY
Qty: 60 TABLET | Refills: 1 | Status: SHIPPED | OUTPATIENT
Start: 2024-06-11

## 2024-06-11 NOTE — PROGRESS NOTES
Pt given tdap left deltoid,  and 28 week teaching.     NDC#25401-920-36  Lot#:[id number]  Exp date:02/10/2026

## 2024-06-11 NOTE — PROGRESS NOTES
[facility]   PRENATAL VISIT  Kamilah Ortega  [id number]  2000        ASSESSMENT/PLAN:  Problem List Items Addressed This Visit       28 weeks gestation of pregnancy     Counseled about delivery, discussed RLTCS vs TOLAC. We discussed risks of uterine rupture with TOLAC, and other indications for C/S intrapartum including category II tracing and arrest of dilation/descent. We also discussed risks of RLTCS including increased scar tissue, greater likelihood of injury to surrounding structures, and increased risk of placenta abnormalities. Kamilah desires several more children and would like to proceed with TOLAC at this time.  Offered TDAP, pt accepted  Ordered and discussed 28w labs    I consented the patient for delivery. The patient was counseled about the labor process. We discussed three routes of delivery including spontaneous vaginal delivery, operative vaginal delivery and  delivery. The patient was counseled on the risks of vaginal delivery including pain, vaginal/perineal tears and the need for repair at the bedside, infection and bleeding.      Patient counseled on indications necessitating operative vaginal delivery including: maternal medical indications in which patient would need to avoid pushing, prolonged second stage and nonreassuring fetal heart tracing during second stage. Patient counseled on maternal risks of vaginal delivery including increase risk of tearing and hemorrhage. We also discussed fetal risks including intracranial hemorrhage, lacerations, nerve damage or fracture. Patient is aware that NICU providers would be available at the time of delivery to assess fetal status after delivery and need for resuscitation.      She was counseled on the indications for  section including: failed induction, arrest of dilation, persistent category II tracing and arrest of descent. She was counseled on the indications for emergent  section including  evidence of worsening fetal or maternal status, and that there is a risk of general anesthesia. We discussed the risks of  including bleeding, infection, and injury to surrounding structures including the bowel and bladder.     She was counseled that there are medical and surgical methods to manage excessive postpartum bleeding. She was counseled that in the event of excessive blood loss, she may require blood transfusion which includes a small risk of blood borne diseases such as hepatitis and HIV. The patient is OK with receiving a blood transfusion if necessary.  The patient had an opportunity to ask questions and signed consent.           GERD without esophagitis     Pepcid ordered  Recommend avoiding acidic foods         Relevant Medications    famotidine (PEPCID) 20 mg tablet     Other Visit Diagnoses       Prenatal care in third trimester    -  Primary    Relevant Orders    CBC and Platelet    RPR-Syphilis Screening (Total Syphilis IGG/IGM)    Glucose, 1H PG    Encounter for immunization        Relevant Orders    Tdap Vaccine greater than or equal to 8yo (Completed)    Gastroesophageal reflux disease without esophagitis        Relevant Medications    famotidine (PEPCID) 20 mg tablet                  If you are experiencing painful contractions, loss of fluids, vaginal bleeding, or decreased fetal movement, please call ask to speak to OBGYN doctor on call prior to proceeding to Labor & Delivery ([facility] 2nd floor of [facility] [place] or [facility] 3rd floor of [facility]). We have a resident doctor on call at both hospitals 24 hours a day.     Subjective: 23 y.o.  at 28w3d here for prenatal visit. She denies contractions. She denies leakage of fluid and vaginal bleeding. She endorses good fetal movement. She reports some hip and muscle cramping in her legs.     Objective:  Pre- Vitals      Flowsheet Row Most Recent Value   Prenatal Assessment    Fetal Heart Rate 150    Fundal Height (cm) 28 cm   Movement Present   Prenatal Vitals    Blood Pressure 103/64   Weight - Scale 70.1 kg (154 lb 9.6 oz)   Urine Albumin/Glucose    Dilation/Effacement/Station    Vaginal Drainage    Edema              Pregnancy Plan:  Pregnancy: Gonzalez  Fetal sex: Female     Delivery Plans  Deliver by GA (weeks): 40  Planned delivery method: TOLAC  Planned delivery location: [facility]  Planned anesthesia: Epidural  Acceptable blood products: All     Post-Delivery Plans  Feeding intentions: Breast Milk  Planned birth control: I.U.D.      General: Well appearing, no distress  Respiratory: Unlabored breathing  Cardiovascular: Regular rate.  Abdomen: Soft, gravid, nontender  Fundal Height: Appropriate for gestational age.  Extremities: Warm and well perfused.  Non tender.    D/w Dr. Toussaint-Foster Jane Saviers-Steiger, MD  PGY 1, Obstetrics and Gynecology  6/11/2024  3:59 PM

## 2024-06-11 NOTE — ASSESSMENT & PLAN NOTE
Counseled about delivery, discussed RLTCS vs TOLAC. We discussed risks of uterine rupture with TOLAC, and other indications for C/S intrapartum including category II tracing and arrest of dilation/descent. We also discussed risks of RLTCS including increased scar tissue, greater likelihood of injury to surrounding structures, and increased risk of placenta abnormalities. Kamilah desires several more children and would like to proceed with TOLAC at this time.  Offered TDAP, pt accepted  Ordered and discussed 28w labs    I consented the patient for delivery. The patient was counseled about the labor process. We discussed three routes of delivery including spontaneous vaginal delivery, operative vaginal delivery and  delivery. The patient was counseled on the risks of vaginal delivery including pain, vaginal/perineal tears and the need for repair at the bedside, infection and bleeding.      Patient counseled on indications necessitating operative vaginal delivery including: maternal medical indications in which patient would need to avoid pushing, prolonged second stage and nonreassuring fetal heart tracing during second stage. Patient counseled on maternal risks of vaginal delivery including increase risk of tearing and hemorrhage. We also discussed fetal risks including intracranial hemorrhage, lacerations, nerve damage or fracture. Patient is aware that NICU providers would be available at the time of delivery to assess fetal status after delivery and need for resuscitation.      She was counseled on the indications for  section including: failed induction, arrest of dilation, persistent category II tracing and arrest of descent. She was counseled on the indications for emergent  section including evidence of worsening fetal or maternal status, and that there is a risk of general anesthesia. We discussed the risks of  including bleeding, infection, and injury to surrounding structures  including the bowel and bladder.     She was counseled that there are medical and surgical methods to manage excessive postpartum bleeding. She was counseled that in the event of excessive blood loss, she may require blood transfusion which includes a small risk of blood borne diseases such as hepatitis and HIV. The patient is OK with receiving a blood transfusion if necessary.  The patient had an opportunity to ask questions and signed consent.

## 2024-06-24 ENCOUNTER — ROUTINE PRENATAL (OUTPATIENT)
Dept: OBGYN CLINIC | Facility: CLINIC | Age: 24
End: 2024-06-24

## 2024-06-24 VITALS
HEIGHT: 61 IN | DIASTOLIC BLOOD PRESSURE: 72 MMHG | HEART RATE: 107 BPM | WEIGHT: 155.8 LBS | BODY MASS INDEX: 29.42 KG/M2 | SYSTOLIC BLOOD PRESSURE: 110 MMHG

## 2024-06-24 DIAGNOSIS — Z34.93 PRENATAL CARE IN THIRD TRIMESTER: Primary | ICD-10-CM

## 2024-06-24 DIAGNOSIS — Z3A.30 30 WEEKS GESTATION OF PREGNANCY: ICD-10-CM

## 2024-06-24 PROCEDURE — 99213 OFFICE O/P EST LOW 20 MIN: CPT | Performed by: NURSE PRACTITIONER

## 2024-06-24 RX ORDER — VITAMIN A ACETATE, .BETA.-CAROTENE, ASCORBIC ACID, CHOLECALCIFEROL, .ALPHA.-TOCOPHEROL ACETATE, DL-, THIAMINE MONONITRATE, RIBOFLAVIN, NIACINAMIDE, PYRIDOXINE HYDROCHLORIDE, FOLIC ACID, CYANOCOBALAMIN, CALCIUM CARBONATE, FERROUS FUMARATE, ZINC OXIDE, CUPRIC OXIDE 9.9; 120; 920; 200; 400; 2; 12; 27; 1; 20; 10; 3; 1.84; 3080; 25 MG/1; MG/1; [IU]/1; MG/1; [IU]/1; MG/1; UG/1; MG/1; MG/1; MG/1; MG/1; MG/1; MG/1; [IU]/1; MG/1
1 TABLET, FILM COATED ORAL DAILY
Qty: 90 TABLET | Refills: 4 | Status: SHIPPED | OUTPATIENT
Start: 2024-06-24

## 2024-06-24 NOTE — PROGRESS NOTES
Kamilah Ortega presents today for routine OB visit at 30w2d.  Blood Pressure: 110/72  Wt=70.7 kg (155 lb 12.8 oz); Body mass index is 29.44 kg/m².; TWG=10.3 kg (22 lb 12.8 oz)  Fetal Heart Rate: 142; Fundal Height (cm): 31 cm  Abdomen: gravid, soft, non-tender.  She reports fatigue.  Reports occasional mild uterine contractions.  Denies vaginal bleeding or leaking of fluid.  Reports adequate fetal movement of at least 10 movements in 2 hours once daily.  Scheduled for ultrasound 7/9/24.  Reviewed premature labor precautions and fetal kick counts.  Advised to continue medications and return in 2 weeks.      Current Outpatient Medications   Medication Instructions    albuterol (ProAir HFA) 90 mcg/act inhaler 2 puffs, Inhalation, Every 6 hours PRN    famotidine (PEPCID) 20 mg, Oral, Daily    Prenatal Vit-Fe Fumarate-FA (Prenatal Plus Vitamin/Mineral) 27-1 MG TABS 1 tablet, Oral, Daily       Laboratory workup: initial OB labs (done 2/12/24); 28 week labs (ordered 6/11/24)    Genetic Screening: NIPS negative, MSAFP not done    Vaccinations: influenza (given 10/1/23); Tdap (given 6/11/24); RSV (not indicated outside RSV season); COVID-19 (rec'd x2 doses in 2021 - will bring documentation)    Postpartum contraception: desires Mirena IUD    Fetal Ultrasounds:  1/15/24 (7w3d) EDC confirmed  2/21/24 (12w4d) NT WNL  4/5/24 (18w6d) CL=3.86cm  4/23/24 (21w3d) no previa, CL=3.67cm, tiffany WNL w/missed views  5/7/24 (23w3d) CL=4.19cm, tiffany WNL & complete      G2 Problems (from 01/15/24 to present)       Problem Noted Resolved    Hx PTD @36w 2/21/2024 by AL Huffman No    Overview Addendum 5/13/2024  3:39 PM by AL Staples     PROM @36w6d  Needs MFM consultation - done  Serial CL measurements - done WNL         History of c/s x1 2/17/2024 by AL Staples No    Overview Signed 2/17/2024 11:04 AM by AL Staples     2021 Op note documents LTV uterine incision; indication breech  presentation  Desires TOLAC         Family hx DM 2/17/2024 by AL Staples No    Overview Signed 2/17/2024 11:05 AM by AL Staples     Pt's mother w/DM  Needs early GDM screen - done WNL         Asthma during pregnancy 2/17/2024 by AL Staples No    Overview Signed 2/17/2024 11:08 AM by AL Staples     Albuterol prn         Not immune to HBV 2/13/2024 by AL Staples No    Overview Addendum 5/13/2024  3:39 PM by AL Staples     Needs HBV vaccine booster

## 2024-06-24 NOTE — PATIENT INSTRUCTIONS
Thank you for your confidence in our team.   We appreciate you and welcome your feedback.   If you receive a survey from us, please take a few moments to let us know how we are doing.   Sincerely,  AL Staples       The Third Trimester  (28-42 weeks)  YOUR BABY   * your baby sucks its thumb now!   * your baby can hear voices and respond to touch…..so talk to him or her!!   * your baby’s brain grows and develops most in the last 2 months of pregnancy   * baby’s head and bones are soft and flexible so they can fit through the birth canal   * baby’s movements change towards the end of pregnancy because there is less room for kicking and stretching in your belly   * baby’s lungs are not fully developed and completely ready to breathe on their own until the last 3-4 weeks before your due date    YOUR BODY   * your belly is growing a lot now   * it may become more difficult to sleep well at night or to be as active as you usually are   * you may sweat more than usual   * you will become more off-balance……be careful not to fall!!   * you may develop hemorrhoids (which can be painful and make it difficult to sit down)   * the last two months of pregnancy can become very uncomfortable……with backaches, headaches, and heartburn   * you can start to have contractions…….as long as they are irregular and less than 5 per hour, this is a normal part of your body getting ready to have a baby   * your cervix may start to thin out and open up……to get ready for delivery   * you may find yourself needing to “pee” very often…….because baby is pressing on your bladder so much   * you may get out of breathe more quickly than usual      FETAL KICK COUNTS    In the third trimester (after 28 weeks gestation) you should be performing fetal kick counts every day.  Your baby should move at least 10 times in 2 hours during an active time, once a day.    Choose atime of day when your baby is most active.  Try to do this around the  same time each day.  Get into a comfortable position and then write down the time your baby first moves.  Count each movement until the baby moves 10 times.  These movements include kicks, punches, nudges, flutters, or rolls.  This can take anywhere from 5 minutes to 2 hours.  Write down the time you feel the baby's 10th movement.    If 2 hours has passed and your baby has not moved at least 10 times, you should CALL THE OFFICE RIGHT AWAY.  [phone].          PREMATURE LABOR     When to call [phone]:  * I need to call immediately if I have even a small amount of LIQUID leaking from my vagina, with or without contractions.   * I need to call if I am BLEEDING from my vagina.   * I need to call if I am feeling CRAMPING that continues after drinking 2-3 glasses of water and lying down on my side for one hour and that feels like I am having a period.   * I need to call if I feel CONTRACTIONS  more than 4 times in an hour that feels like the baby is “balling up” even after I try drinking 2-3 glasses of water and lying down on my side for an hour.   * I need to call if I notice a change in my vaginal DISCHARGE.   * I need to call if I am feeling PELVIC PRESSURE  that feels like the baby is pushing down into my vagina and lasts more than an hour.   * I need to call if I have LOW BACKACHE which is new and near my tailbone.  It may either come and go several times during an hour or stay there constantly.          PRE-ECLAMPSIA     What is it?   Pre-eclampsia is a serious disease that can occur during pregnancy related to high blood pressures.  It can happen to any woman.     Why should I care?   Women who develop pre-eclampsia have serious risks which can include seizures, stroke, organ damage, premature birth of their baby.  In the very worst cases, it can cause death of the mother and/or their baby.     What should I pay attention to?   Signs and symptoms of pre-eclampsia can include:   * Severe swelling of face or  hands    * A headache that will not go away even after you have taken Tylenol   * Seeing spots or changes in eyesight    * Pain in the upper abdomen or shoulder    * New nausea and vomiting (in the second half of pregnancy)    * Sudden weight gain    * Difficulty breathing     What should I do?   If you experience any of the above symptoms of pre-eclampsia, contact your OB provider.  Finding pre-eclampsia early is important for you and your baby.  Call us at [phone]..      BREASTFEEDING     BENEFITS FOR BABIES   * stronger immune systems (less allergies, eczema, asthma, and childhood cancers)   * less diarrhea and constipation or other GI diseases   * fewer colds and ear infections   * better vision and teeth (fewer cavities)   * improves IQ   * lower rates of diabetes and obesity in childhood     BENEFITS FOR MOMS   * promotes faster weight loss after delivery   * lower risk for postpartum depression   * lower risk for breast, uterine, and ovarian cancers   * lower risk for osteoporosis developing with age   * easier than formula - is always right with you, clean, and the right temperature   * less expensive than formula……it’s FREE !!!!     KEYS TO SUCCESSFUL BREASTFEEDING   * keep baby skin-to-skin until after first feeding event   * keep baby in your room with you during your hospital stay after delivery   * avoid any bottle feedings (unless medically necessary)   * limit the use of pacifiers and swaddling   * ask for help if you are having any issues……lactation consultants (who specialize in breastfeeding) are available to help you   * a healthy diet for mom……eating a variety of foods and portions in moderation    THINGS YOU SHOULD KNOW ABOUT BREASTFEEDING   * most medications are considered compatible with breastfeeding by the American Academy of Pediatrics, but you should check with your health care provider or lactation consultant prior to taking a new medication……just to be sure it is safe   * alcohol  (beer, wine, liquor) can be passed from mother to baby through breast milk……an occasional, social drink is deemed acceptable by the American Academy of Pediatrics…..more than that should be avoided   * breastfeeding is NOT an effective method of birth control   * nicotine (in cigarettes) can pass from mother to baby through breast milk…..however, for mothers who smoke, it is still healthier to breastfeed than use formula   * caffeine should be limited to 1-3 cups per day……includes coffee, soda, energy drinks         PERINEAL / VAGINAL MASSAGE    What can I do now to decrease my chances of tearing during delivery?  Massaging around the vaginal opening by you (or your partner), either antepartum (before birth) or during the second stage of labor, can reduce the likelihood of perineal tearing during childbirth.  Likewise, the use of warm packs held on the perineum during the pushing stage of labor can reduce the severity of your tear.  This will happen during the pushing stage of labor.  At home, you can also help reduce the chances of injury that may occur during the birth of your child through perineal massage.    When should I do this?  Starting around or shortly after 34 weeks of pregnancy, you or your partner should provide 5-10 minutes of vaginal massage 1-4 times per week.    How?  Use either almond, coconut, or olive oil and water mixture on 1 or 2 fingers (depending on comfort).  Insert finger(s) 3-5cm into the vagina.  Apply sweeping downward/sideward pressure from 3 to 9 o'clock for 5-10 minutes, 1-4 times per week.          WARNING SIGNS DURING PREGNANCY  Call our office at [phone] if you experience any of the followin. Vaginal bleeding  2. Sharp abdominal pain that does not go away  3. Fever (more than 100.4 and is not relieved by Tylenol)  4. Persistent vomiting lasting greater than 24 hours  5. Chest pain   6. Pain or burning when you urinate  7. Severe headache that doesn't resolve with  Tylenol  8. Blurred vision or seeing spots in your vision  9. Sudden swelling of your face or hands  10. Redness, swelling or pain in a leg  11. A sudden weight gain in just a few days  12. Decrease in your baby's movement (after 28 weeks or the 6th month of pregnancy)  13. A loss of watery fluid from your vagina - can be a gush, a trickle or continuous wetness  14. After 20 weeks of pregnancy, rhythmic cramping (greater than 4 per hour) or menstrual like low/pelvic pain          VACCINES IN PREGNANCY    TDAP  Whooping cough (or pertussis) can be serious for anyone, but for your , it can be life-threatning.  Up to 20 babies die each year in the [place] Due to whooping cough.  About half of babies younger than 1 year old who get whooping cough need treatment in the hospital.  The younger the baby is when he or she gets whooping cough, the more likely he or she will need to be treated in a hospital.  When you receive the whooping cough vaccine (Tdap) during your pregnancy, your body will create protective antibodies and pass some of them to your baby before birth.  These antibodies can help protect your baby from getting whooping cough until they are old enough to be vaccinated themselves (usually around 6 months of age).    INFLUENZA  Changes in your immune, heart, and lung functions during pregnancy make you more likely to get seriously ill from the flu.  Catching the flu also increases your chances for serious problems for your developing baby, including premature labor and delivery.  It is recommended that all women who are pregnant during flu season should receive an influenza vaccine.

## 2024-06-25 ENCOUNTER — TELEPHONE (OUTPATIENT)
Dept: OBGYN CLINIC | Facility: CLINIC | Age: 24
End: 2024-06-25

## 2024-06-27 ENCOUNTER — TELEMEDICINE (OUTPATIENT)
Dept: BEHAVIORAL/MENTAL HEALTH CLINIC | Facility: CLINIC | Age: 24
End: 2024-06-27
Payer: COMMERCIAL

## 2024-06-27 DIAGNOSIS — F93.8: Primary | ICD-10-CM

## 2024-06-27 DIAGNOSIS — F33.40 RECURRENT MAJOR DEPRESSIVE DISORDER, IN REMISSION (HCC): ICD-10-CM

## 2024-06-27 PROCEDURE — 90837 PSYTX W PT 60 MINUTES: CPT | Performed by: COUNSELOR

## 2024-06-27 NOTE — PSYCH
Virtual Regular Visit    Verification of patient location:    Patient is located at Home in the following state in which I hold an active license [place]      Assessment/Plan:    Problem List Items Addressed This Visit    None  Visit Diagnoses       Relationship problems specific to childhood and adolescence    -  Primary    Recurrent major depressive disorder, in remission (HCC)                Goals addressed in session: Goal 1          Reason for visit is No chief complaint on file.       Encounter provider Rubina Stock LPC      Recent Visits  No visits were found meeting these conditions.  Showing recent visits within past 7 days and meeting all other requirements  Today's Visits  Date Type Provider Dept   06/27/24 Telemedicine Rubina Stock LPC [facility] Therapist [address]   Showing today's visits and meeting all other requirements  Future Appointments  No visits were found meeting these conditions.  Showing future appointments within next 150 days and meeting all other requirements       The patient was identified by name and date of birth. Kamilah Ortega was informed that this is a telemedicine visit and that the visit is being conducted throughthe Notifo platform. She agrees to proceed..  My office door was closed. No one else was in the room.  She acknowledged consent and understanding of privacy and security of the video platform. The patient has agreed to participate and understands they can discontinue the visit at any time.    Patient is aware this is a billable service.     Subjective  Kamilah Ortega is a 23 y.o. female Behavioral Health Psychotherapy Progress Note    Psychotherapy Provided: Individual Psychotherapy     1. Relationship problems specific to childhood and adolescence        2. Recurrent major depressive disorder, in remission (HCC)            Goals addressed in session: Goal 1     DATA: Kamilah reports to have been resting at home. She reports she is now working part  time. Kamilah reports that she and Bethel have moved into their own space. We discussed how they've been nesting and settling in and have been slowly making their space a home. She reports to have moving towards growing spiritually. She reports to have been searching for a Restorationism she feels at home with. Clinician provided resources and encourage her to continue searching based on her interest and Restoration expectations. We also discussed how now having their own space has helped reduce the arguments. She reports, Bethel and herself have been loving and has been intentional with caring for each other. Adelina reports although things have been improving she's had her reserve about him. She fears him becoming a womanizer and expecting her to do all the chores around the home. We discussed t he importance of keeping a balance, and both parties taking responsibilities of the home. Clinician and Kamilah reviewed the list of gratitude tips to help with their marriage. As Kamilah reported some barriers, we explored forms to help incorporate the tips to their day to day activities.   During this session, this clinician used the following therapeutic modalities: Engagement Strategies, Client-centered Therapy, Cognitive Behavioral Therapy, Cognitive Processing Therapy, Mindfulness-based Strategies, Motivational Interviewing, Solution-Focused Therapy, and Supportive Psychotherapy    Substance Abuse was not addressed during this session. If the client is diagnosed with a co-occurring substance use disorder, please indicate any changes in the frequency or amount of use: Kamilah denies substance abuse. Stage of change for addressing substance use diagnoses: No substance use/Not applicable    ASSESSMENT:  Kamilah Ortega presents with a Euthymic/ normal mood.     her affect is Normal range and intensity, which is congruent, with her mood and the content of the session. The client has made progress on their goals.    Kamilah denies  "suicidal/homicidal ideations or self-injurious behaviors.  Kamilah Ortega presents with a none risk of suicide, none risk of self-harm, and none risk of harm to others.    For any risk assessment that surpasses a \"low\" rating, a safety plan must be developed.    A safety plan was indicated: no  If yes, describe in detail safety plan not indicated.    PLAN: Between sessions, Kamilah Ortega will make an effort to complete the gratitude tips with her hsuband. At the next session, the therapist will use Engagement Strategies, Client-centered Therapy, Cognitive Behavioral Therapy, Cognitive Processing Therapy, Mindfulness-based Strategies, Motivational Interviewing, Solution-Focused Therapy, and Supportive Psychotherapy to address continue discussing how to confront each other lovingly.    Behavioral Health Treatment Plan and Discharge Planning: Kamilah Ortega is aware of and agrees to continue to work on their treatment plan. They have identified and are working toward their discharge goals. yes    Visit start and stop times:    24  Start Time: 1501  Stop Time: 1556  Total Visit Time: 55 minutes .      HPI     Past Medical History:   Diagnosis Date    Asthma     Depression        Past Surgical History:   Procedure Laterality Date    [place]  DELIVERY ONLY N/A 2021    Procedure:  SECTION ();  Surgeon: Sari SAPP MD;  Location: [facility];  Service: Obstetrics       Current Outpatient Medications   Medication Sig Dispense Refill    albuterol (ProAir HFA) 90 mcg/act inhaler Inhale 2 puffs every 6 (six) hours as needed for wheezing 8 g 3    famotidine (PEPCID) 20 mg tablet Take 1 tablet (20 mg total) by mouth daily 60 tablet 1    Prenatal Vit-Fe Fumarate-FA (Prenatal Plus Vitamin/Mineral) 27-1 MG TABS Take 1 tablet by mouth in the morning 90 tablet 4     No current facility-administered medications for this visit.        Allergies   Allergen Reactions    Fish-Derived Products - Food " Allergy     Isoflavones (Soy)     Peanut Oil - Food Allergy        Review of Systems    Video Exam    There were no vitals filed for this visit.    Physical Exam

## 2024-07-09 ENCOUNTER — ULTRASOUND (OUTPATIENT)
Age: 24
End: 2024-07-09
Payer: COMMERCIAL

## 2024-07-09 VITALS
HEIGHT: 61 IN | HEART RATE: 106 BPM | SYSTOLIC BLOOD PRESSURE: 118 MMHG | BODY MASS INDEX: 30.09 KG/M2 | WEIGHT: 159.4 LBS | DIASTOLIC BLOOD PRESSURE: 60 MMHG

## 2024-07-09 DIAGNOSIS — O36.5990 FETAL GROWTH RESTRICTION ANTEPARTUM: ICD-10-CM

## 2024-07-09 DIAGNOSIS — O34.219 HISTORY OF CESAREAN DELIVERY, CURRENTLY PREGNANT: Primary | ICD-10-CM

## 2024-07-09 DIAGNOSIS — Z3A.32 32 WEEKS GESTATION OF PREGNANCY: ICD-10-CM

## 2024-07-09 DIAGNOSIS — O09.899 HISTORY OF PRETERM DELIVERY, CURRENTLY PREGNANT: ICD-10-CM

## 2024-07-09 PROCEDURE — 59025 FETAL NON-STRESS TEST: CPT | Performed by: OBSTETRICS & GYNECOLOGY

## 2024-07-09 PROCEDURE — 76820 UMBILICAL ARTERY ECHO: CPT | Performed by: OBSTETRICS & GYNECOLOGY

## 2024-07-09 PROCEDURE — 76816 OB US FOLLOW-UP PER FETUS: CPT | Performed by: OBSTETRICS & GYNECOLOGY

## 2024-07-09 PROCEDURE — 99214 OFFICE O/P EST MOD 30 MIN: CPT | Performed by: OBSTETRICS & GYNECOLOGY

## 2024-07-09 NOTE — LETTER
July 9, 2024     Patient: Kamilah Ortega  YOB: 2000  Date of Visit: 7/9/2024      To Whom it May Concern:    Kamilah Ortega is under my professional care. Kamilah was seen in my office on 7/9/2024.    If you have any questions or concerns, please don't hesitate to call.          Sincerely,          Leoncio Scott MD        CC: No Recipients

## 2024-07-09 NOTE — PROGRESS NOTES
A fetal ultrasound and NST were completed. See Ob procedures in Epic for an interpretation and recommendations. Do not hesitate to contact us in [facility] if you have questions.    Leoncio Scott MD, MSCE  Maternal Fetal Medicine

## 2024-07-09 NOTE — LETTER
July 9, 2024     AL Staples  [address] [place] [id number]    Patient: Kamilah Ortega   YOB: 2000   Date of Visit: 7/9/2024       Dear Julianne    Thank you for referring Kamilah Ortega to me for evaluation. Below are my notes for this consultation.    If you have questions, please do not hesitate to call me. I look forward to following your patient along with you.         Sincerely,        Leoncio Scott MD        CC: No Recipients    Leoncio Scott MD  7/9/2024  3:47 PM  Sign when Signing Visit  A fetal ultrasound and NST were completed. See Ob procedures in Epic for an interpretation and recommendations. Do not hesitate to contact us in [facility] if you have questions.    Leoncio Scott MD, MSCE  Maternal Fetal Medicine

## 2024-07-12 ENCOUNTER — TELEMEDICINE (OUTPATIENT)
Dept: BEHAVIORAL/MENTAL HEALTH CLINIC | Facility: CLINIC | Age: 24
End: 2024-07-12
Payer: COMMERCIAL

## 2024-07-12 DIAGNOSIS — F33.40 RECURRENT MAJOR DEPRESSIVE DISORDER, IN REMISSION (HCC): ICD-10-CM

## 2024-07-12 DIAGNOSIS — F93.8: Primary | ICD-10-CM

## 2024-07-12 PROCEDURE — 90834 PSYTX W PT 45 MINUTES: CPT | Performed by: COUNSELOR

## 2024-07-12 NOTE — PSYCH
Virtual Regular Visit    Verification of patient location:    Patient is located at Home in the following state in which I hold an active license [place]      Assessment/Plan:    Problem List Items Addressed This Visit    None  Visit Diagnoses       Relationship problems specific to childhood and adolescence    -  Primary    Recurrent major depressive disorder, in remission (HCC)                Goals addressed in session: Goal 1          Reason for visit is No chief complaint on file.       Encounter provider Rubina Stock LPC      Recent Visits  No visits were found meeting these conditions.  Showing recent visits within past 7 days and meeting all other requirements  Today's Visits  Date Type Provider Dept   07/12/24 Telemedicine Rubina Stock LPC [facility] Therapist [address]   Showing today's visits and meeting all other requirements  Future Appointments  No visits were found meeting these conditions.  Showing future appointments within next 150 days and meeting all other requirements       The patient was identified by name and date of birth. Kamilah Ortega was informed that this is a telemedicine visit and that the visit is being conducted throughthe Mogujie platform. She agrees to proceed..  My office door was closed. No one else was in the room.  She acknowledged consent and understanding of privacy and security of the video platform. The patient has agreed to participate and understands they can discontinue the visit at any time.    Patient is aware this is a billable service.     Subjective  Kamilah Ortega is a 23 y.o. female Behavioral Health Psychotherapy Progress Note    Psychotherapy Provided: Individual Psychotherapy     1. Relationship problems specific to childhood and adolescence        2. Recurrent major depressive disorder, in remission (HCC)            Goals addressed in session: Goal 1     DATA: Kamilah shared she's been nesting and decorating. We discussed how decorating and making  her new space her home, we discussed how that has helped with a sense of belonging. We discussed how her emotions have been actively affecting how she interacts with her , and her feeling unsupported by her . Clinician actively listened to Kamilah as we processed and discussed how it has been like living with her . Clinician validated her emotions and discussed how to regulate her emotions and normalized feeling frustrated. Clinician advised it important that Bethel be more involved in our session, and holding him accountable for his behaviors and hearing his perspective of the experience. Clinician and Kamilah discussed her approach to her  and how at times we focus so much on the negative in our life and how our partner does that it becomes difficult to identify the positives. Clinician reminded Kamilah of the relationship gratitude tip handout and reviewed each of the exercises. We discussed the importance that both are aware of the need to utilize the tips, and keeping the handout visible for both of them to be reminded to show gratitude and support to each other. We also discussed being clear with her needs and expectations, and how assumptions can lead to confusion and discord. During this session, this clinician used the following therapeutic modalities: Engagement Strategies, Client-centered Therapy, Mindfulness-based Strategies, Motivational Interviewing, Solution-Focused Therapy, and Supportive Psychotherapy    Substance Abuse was addressed during this session. If the client is diagnosed with a co-occurring substance use disorder, please indicate any changes in the frequency or amount of use: Kamilah denies substance abuse. Stage of change for addressing substance use diagnoses: No substance use/Not applicable    ASSESSMENT:  Kamilah Ortega presents with a Euthymic/ normal mood.     her affect is Normal range and intensity, which is congruent, with her mood and the content of the  "session. The client has made progress on their goals.    Kamilah denies suicidal/homicidal ideations or self-injurious behaviors.  Kamilah Ortega presents with a none risk of suicide, none risk of self-harm, and none risk of harm to others.    For any risk assessment that surpasses a \"low\" rating, a safety plan must be developed.    A safety plan was indicated: no  If yes, describe in detail safety plan not indicated.    PLAN: Between sessions, Kamilah Ortega will utilize the gratitude tip for relationships, skills to self-regulate and . At the next session, the therapist will use Engagement Strategies, Client-centered Therapy, Cognitive Behavioral Therapy, Cognitive Processing Therapy, Dialectical Behavior Therapy, Mindfulness-based Strategies, Motivational Interviewing, Solution-Focused Therapy, and Supportive Psychotherapy to address her feeling of depression.    Behavioral Health Treatment Plan and Discharge Planning: Kamilah Ortega is aware of and agrees to continue to work on their treatment plan. They have identified and are working toward their discharge goals. yes    Visit start and stop times:    24  Start Time: 1103  Stop Time: 1155  Total Visit Time: 52 minutes .      HPI     Past Medical History:   Diagnosis Date    Asthma     Depression        Past Surgical History:   Procedure Laterality Date    [place]  DELIVERY ONLY N/A 2021    Procedure:  SECTION ();  Surgeon: Sari SAPP MD;  Location: [facility];  Service: Obstetrics       Current Outpatient Medications   Medication Sig Dispense Refill    albuterol (ProAir HFA) 90 mcg/act inhaler Inhale 2 puffs every 6 (six) hours as needed for wheezing 8 g 3    famotidine (PEPCID) 20 mg tablet Take 1 tablet (20 mg total) by mouth daily 60 tablet 1    Prenatal Vit-Fe Fumarate-FA (Prenatal Plus Vitamin/Mineral) 27-1 MG TABS Take 1 tablet by mouth in the morning 90 tablet 4     No current facility-administered medications for " this visit.        Allergies   Allergen Reactions    Fish-Derived Products - Food Allergy     Isoflavones (Soy)     Peanut Oil - Food Allergy        Review of Systems    Video Exam    There were no vitals filed for this visit.    Physical Exam

## 2024-07-16 ENCOUNTER — APPOINTMENT (OUTPATIENT)
Dept: LAB | Facility: HOSPITAL | Age: 24
End: 2024-07-16
Payer: COMMERCIAL

## 2024-07-16 DIAGNOSIS — Z34.92 PRENATAL CARE IN SECOND TRIMESTER: ICD-10-CM

## 2024-07-16 DIAGNOSIS — Z34.93 PRENATAL CARE IN THIRD TRIMESTER: ICD-10-CM

## 2024-07-16 LAB
ERYTHROCYTE [DISTWIDTH] IN BLOOD BY AUTOMATED COUNT: 14.2 % (ref 11.6–15.1)
GLUCOSE 1H P 50 G GLC PO SERPL-MCNC: 121 MG/DL (ref 70–134)
HCT VFR BLD AUTO: 31.2 % (ref 34.8–46.1)
HGB BLD-MCNC: 10.2 G/DL (ref 11.5–15.4)
MCH RBC QN AUTO: 28.8 PG (ref 26.8–34.3)
MCHC RBC AUTO-ENTMCNC: 32.7 G/DL (ref 31.4–37.4)
MCV RBC AUTO: 88 FL (ref 82–98)
PLATELET # BLD AUTO: 148 THOUSANDS/UL (ref 149–390)
PMV BLD AUTO: 11.3 FL (ref 8.9–12.7)
RBC # BLD AUTO: 3.54 MILLION/UL (ref 3.81–5.12)
TREPONEMA PALLIDUM IGG+IGM AB [PRESENCE] IN SERUM OR PLASMA BY IMMUNOASSAY: NORMAL
WBC # BLD AUTO: 10.63 THOUSAND/UL (ref 4.31–10.16)

## 2024-07-16 PROCEDURE — 85027 COMPLETE CBC AUTOMATED: CPT

## 2024-07-16 PROCEDURE — 36415 COLL VENOUS BLD VENIPUNCTURE: CPT

## 2024-07-16 PROCEDURE — 82950 GLUCOSE TEST: CPT

## 2024-07-16 PROCEDURE — 82105 ALPHA-FETOPROTEIN SERUM: CPT

## 2024-07-16 PROCEDURE — 86780 TREPONEMA PALLIDUM: CPT

## 2024-07-19 ENCOUNTER — ULTRASOUND (OUTPATIENT)
Dept: PERINATAL CARE | Facility: CLINIC | Age: 24
End: 2024-07-19
Payer: COMMERCIAL

## 2024-07-19 VITALS
HEART RATE: 89 BPM | BODY MASS INDEX: 30.21 KG/M2 | DIASTOLIC BLOOD PRESSURE: 66 MMHG | SYSTOLIC BLOOD PRESSURE: 108 MMHG | HEIGHT: 61 IN | WEIGHT: 160 LBS

## 2024-07-19 DIAGNOSIS — O36.5990 FETAL GROWTH RESTRICTION ANTEPARTUM: ICD-10-CM

## 2024-07-19 DIAGNOSIS — Z33.1 PREGNANT STATE, INCIDENTAL: Primary | ICD-10-CM

## 2024-07-19 DIAGNOSIS — Z3A.33 33 WEEKS GESTATION OF PREGNANCY: ICD-10-CM

## 2024-07-19 PROCEDURE — 59025 FETAL NON-STRESS TEST: CPT | Performed by: OBSTETRICS & GYNECOLOGY

## 2024-07-19 PROCEDURE — 76815 OB US LIMITED FETUS(S): CPT | Performed by: OBSTETRICS & GYNECOLOGY

## 2024-07-19 PROCEDURE — 76820 UMBILICAL ARTERY ECHO: CPT | Performed by: OBSTETRICS & GYNECOLOGY

## 2024-07-19 NOTE — LETTER
July 19, 2024     AL Staples  [address] [place] [address]  [place] [id number]    Patient: Kamilah Ortega   YOB: 2000   Date of Visit: 7/19/2024       Dear AL Mack:    Kamilah Ortega needs prenatal visits scheduled with your office. Could your staff contact her to schedule, please?      Sincerely,    Jeanette Perez MD        CC: No Recipients

## 2024-07-19 NOTE — PROGRESS NOTES
Repeat Non-Stress Testing:    Patient verbalizes +FM. Pt denies ALL:               Leaking of fluid   Contractions   Vaginal bleeding   Decreased fetal movement    Patient is performing daily kick counts. Patient has no questions or concerns.   NST strip reviewed by Dr. Dr. Perez.

## 2024-07-22 LAB
2ND TRIMESTER 4 SCREEN SERPL-IMP: NORMAL
AFP ADJ MOM SERPL: NORMAL
AFP INTERP AMN-IMP: NORMAL
AFP INTERP SERPL-IMP: NORMAL
AFP INTERP SERPL-IMP: NORMAL
AFP SERPL-MCNC: 296 NG/ML
AGE AT DELIVERY: 21.6 YR
GA METHOD: NORMAL
GA: 33.4 WEEKS
IDDM PATIENT QL: NO
MULTIPLE PREGNANCY: NO
NEURAL TUBE DEFECT RISK FETUS: NORMAL %

## 2024-07-23 ENCOUNTER — APPOINTMENT (OUTPATIENT)
Dept: LAB | Facility: HOSPITAL | Age: 24
End: 2024-07-23
Payer: COMMERCIAL

## 2024-07-23 ENCOUNTER — ROUTINE PRENATAL (OUTPATIENT)
Dept: OBGYN CLINIC | Facility: CLINIC | Age: 24
End: 2024-07-23

## 2024-07-23 VITALS
WEIGHT: 160.6 LBS | SYSTOLIC BLOOD PRESSURE: 112 MMHG | HEIGHT: 61 IN | DIASTOLIC BLOOD PRESSURE: 74 MMHG | BODY MASS INDEX: 30.32 KG/M2 | HEART RATE: 108 BPM

## 2024-07-23 DIAGNOSIS — Z34.93 PRENATAL CARE IN THIRD TRIMESTER: Primary | ICD-10-CM

## 2024-07-23 DIAGNOSIS — D50.9 MATERNAL IRON DEFICIENCY ANEMIA COMPLICATING PREGNANCY IN THIRD TRIMESTER: ICD-10-CM

## 2024-07-23 DIAGNOSIS — O99.013 MATERNAL IRON DEFICIENCY ANEMIA COMPLICATING PREGNANCY IN THIRD TRIMESTER: ICD-10-CM

## 2024-07-23 DIAGNOSIS — Z3A.34 34 WEEKS GESTATION OF PREGNANCY: ICD-10-CM

## 2024-07-23 PROBLEM — O99.019 MATERNAL IRON DEFICIENCY ANEMIA COMPLICATING PREGNANCY: Status: ACTIVE | Noted: 2024-07-23

## 2024-07-23 LAB
FERRITIN SERPL-MCNC: 16 NG/ML (ref 11–307)
IRON SATN MFR SERPL: 16 % (ref 15–50)
IRON SERPL-MCNC: 77 UG/DL (ref 50–212)
TIBC SERPL-MCNC: 494 UG/DL (ref 250–450)
UIBC SERPL-MCNC: 417 UG/DL (ref 155–355)

## 2024-07-23 PROCEDURE — 83550 IRON BINDING TEST: CPT

## 2024-07-23 PROCEDURE — 36415 COLL VENOUS BLD VENIPUNCTURE: CPT

## 2024-07-23 PROCEDURE — 83540 ASSAY OF IRON: CPT

## 2024-07-23 PROCEDURE — 99213 OFFICE O/P EST LOW 20 MIN: CPT | Performed by: NURSE PRACTITIONER

## 2024-07-23 PROCEDURE — 82728 ASSAY OF FERRITIN: CPT

## 2024-07-23 NOTE — PATIENT INSTRUCTIONS
Thank you for your confidence in our team.   We appreciate you and welcome your feedback.   If you receive a survey from us, please take a few moments to let us know how we are doing.   Sincerely,  AL Staples       The Third Trimester  (28-42 weeks)  YOUR BABY   * your baby sucks its thumb now!   * your baby can hear voices and respond to touch…..so talk to him or her!!   * your baby’s brain grows and develops most in the last 2 months of pregnancy   * baby’s head and bones are soft and flexible so they can fit through the birth canal   * baby’s movements change towards the end of pregnancy because there is less room for kicking and stretching in your belly   * baby’s lungs are not fully developed and completely ready to breathe on their own until the last 3-4 weeks before your due date    YOUR BODY   * your belly is growing a lot now   * it may become more difficult to sleep well at night or to be as active as you usually are   * you may sweat more than usual   * you will become more off-balance……be careful not to fall!!   * you may develop hemorrhoids (which can be painful and make it difficult to sit down)   * the last two months of pregnancy can become very uncomfortable……with backaches, headaches, and heartburn   * you can start to have contractions…….as long as they are irregular and less than 5 per hour, this is a normal part of your body getting ready to have a baby   * your cervix may start to thin out and open up……to get ready for delivery   * you may find yourself needing to “pee” very often…….because baby is pressing on your bladder so much   * you may get out of breathe more quickly than usual      FETAL KICK COUNTS    In the third trimester (after 28 weeks gestation) you should be performing fetal kick counts every day.  Your baby should move at least 10 times in 2 hours during an active time, once a day.    Choose atime of day when your baby is most active.  Try to do this around the  same time each day.  Get into a comfortable position and then write down the time your baby first moves.  Count each movement until the baby moves 10 times.  These movements include kicks, punches, nudges, flutters, or rolls.  This can take anywhere from 5 minutes to 2 hours.  Write down the time you feel the baby's 10th movement.    If 2 hours has passed and your baby has not moved at least 10 times, you should CALL THE OFFICE RIGHT AWAY.  [phone].          PREMATURE LABOR     When to call [phone]:  * I need to call immediately if I have even a small amount of LIQUID leaking from my vagina, with or without contractions.   * I need to call if I am BLEEDING from my vagina.   * I need to call if I am feeling CRAMPING that continues after drinking 2-3 glasses of water and lying down on my side for one hour and that feels like I am having a period.   * I need to call if I feel CONTRACTIONS  more than 4 times in an hour that feels like the baby is “balling up” even after I try drinking 2-3 glasses of water and lying down on my side for an hour.   * I need to call if I notice a change in my vaginal DISCHARGE.   * I need to call if I am feeling PELVIC PRESSURE  that feels like the baby is pushing down into my vagina and lasts more than an hour.   * I need to call if I have LOW BACKACHE which is new and near my tailbone.  It may either come and go several times during an hour or stay there constantly.          PRE-ECLAMPSIA     What is it?   Pre-eclampsia is a serious disease that can occur during pregnancy related to high blood pressures.  It can happen to any woman.     Why should I care?   Women who develop pre-eclampsia have serious risks which can include seizures, stroke, organ damage, premature birth of their baby.  In the very worst cases, it can cause death of the mother and/or their baby.     What should I pay attention to?   Signs and symptoms of pre-eclampsia can include:   * Severe swelling of face or  hands    * A headache that will not go away even after you have taken Tylenol   * Seeing spots or changes in eyesight    * Pain in the upper abdomen or shoulder    * New nausea and vomiting (in the second half of pregnancy)    * Sudden weight gain    * Difficulty breathing     What should I do?   If you experience any of the above symptoms of pre-eclampsia, contact your OB provider.  Finding pre-eclampsia early is important for you and your baby.  Call us at [phone]..      BREASTFEEDING     BENEFITS FOR BABIES   * stronger immune systems (less allergies, eczema, asthma, and childhood cancers)   * less diarrhea and constipation or other GI diseases   * fewer colds and ear infections   * better vision and teeth (fewer cavities)   * improves IQ   * lower rates of diabetes and obesity in childhood     BENEFITS FOR MOMS   * promotes faster weight loss after delivery   * lower risk for postpartum depression   * lower risk for breast, uterine, and ovarian cancers   * lower risk for osteoporosis developing with age   * easier than formula - is always right with you, clean, and the right temperature   * less expensive than formula……it’s FREE !!!!     KEYS TO SUCCESSFUL BREASTFEEDING   * keep baby skin-to-skin until after first feeding event   * keep baby in your room with you during your hospital stay after delivery   * avoid any bottle feedings (unless medically necessary)   * limit the use of pacifiers and swaddling   * ask for help if you are having any issues……lactation consultants (who specialize in breastfeeding) are available to help you   * a healthy diet for mom……eating a variety of foods and portions in moderation    THINGS YOU SHOULD KNOW ABOUT BREASTFEEDING   * most medications are considered compatible with breastfeeding by the American Academy of Pediatrics, but you should check with your health care provider or lactation consultant prior to taking a new medication……just to be sure it is safe   * alcohol  (beer, wine, liquor) can be passed from mother to baby through breast milk……an occasional, social drink is deemed acceptable by the American Academy of Pediatrics…..more than that should be avoided   * breastfeeding is NOT an effective method of birth control   * nicotine (in cigarettes) can pass from mother to baby through breast milk…..however, for mothers who smoke, it is still healthier to breastfeed than use formula   * caffeine should be limited to 1-3 cups per day……includes coffee, soda, energy drinks         PERINEAL / VAGINAL MASSAGE    What can I do now to decrease my chances of tearing during delivery?  Massaging around the vaginal opening by you (or your partner), either antepartum (before birth) or during the second stage of labor, can reduce the likelihood of perineal tearing during childbirth.  Likewise, the use of warm packs held on the perineum during the pushing stage of labor can reduce the severity of your tear.  This will happen during the pushing stage of labor.  At home, you can also help reduce the chances of injury that may occur during the birth of your child through perineal massage.    When should I do this?  Starting around or shortly after 34 weeks of pregnancy, you or your partner should provide 5-10 minutes of vaginal massage 1-4 times per week.    How?  Use either almond, coconut, or olive oil and water mixture on 1 or 2 fingers (depending on comfort).  Insert finger(s) 3-5cm into the vagina.  Apply sweeping downward/sideward pressure from 3 to 9 o'clock for 5-10 minutes, 1-4 times per week.          WARNING SIGNS DURING PREGNANCY  Call our office at [phone] if you experience any of the followin. Vaginal bleeding  2. Sharp abdominal pain that does not go away  3. Fever (more than 100.4 and is not relieved by Tylenol)  4. Persistent vomiting lasting greater than 24 hours  5. Chest pain   6. Pain or burning when you urinate  7. Severe headache that doesn't resolve with  Tylenol  8. Blurred vision or seeing spots in your vision  9. Sudden swelling of your face or hands  10. Redness, swelling or pain in a leg  11. A sudden weight gain in just a few days  12. Decrease in your baby's movement (after 28 weeks or the 6th month of pregnancy)  13. A loss of watery fluid from your vagina - can be a gush, a trickle or continuous wetness  14. After 20 weeks of pregnancy, rhythmic cramping (greater than 4 per hour) or menstrual like low/pelvic pain          VACCINES IN PREGNANCY    TDAP  Whooping cough (or pertussis) can be serious for anyone, but for your , it can be life-threatning.  Up to 20 babies die each year in the [place] Due to whooping cough.  About half of babies younger than 1 year old who get whooping cough need treatment in the hospital.  The younger the baby is when he or she gets whooping cough, the more likely he or she will need to be treated in a hospital.  When you receive the whooping cough vaccine (Tdap) during your pregnancy, your body will create protective antibodies and pass some of them to your baby before birth.  These antibodies can help protect your baby from getting whooping cough until they are old enough to be vaccinated themselves (usually around 6 months of age).    INFLUENZA  Changes in your immune, heart, and lung functions during pregnancy make you more likely to get seriously ill from the flu.  Catching the flu also increases your chances for serious problems for your developing baby, including premature labor and delivery.  It is recommended that all women who are pregnant during flu season should receive an influenza vaccine.

## 2024-07-24 ENCOUNTER — TELEPHONE (OUTPATIENT)
Dept: OBGYN CLINIC | Facility: CLINIC | Age: 24
End: 2024-07-24

## 2024-07-24 ENCOUNTER — ULTRASOUND (OUTPATIENT)
Dept: PERINATAL CARE | Facility: CLINIC | Age: 24
End: 2024-07-24
Payer: COMMERCIAL

## 2024-07-24 VITALS
BODY MASS INDEX: 30.25 KG/M2 | WEIGHT: 160.2 LBS | HEIGHT: 61 IN | HEART RATE: 118 BPM | SYSTOLIC BLOOD PRESSURE: 104 MMHG | DIASTOLIC BLOOD PRESSURE: 58 MMHG

## 2024-07-24 DIAGNOSIS — Z3A.34 34 WEEKS GESTATION OF PREGNANCY: Primary | ICD-10-CM

## 2024-07-24 DIAGNOSIS — O36.5930 POOR FETAL GROWTH AFFECTING MANAGEMENT OF MOTHER IN THIRD TRIMESTER, SINGLE OR UNSPECIFIED FETUS: ICD-10-CM

## 2024-07-24 DIAGNOSIS — O99.013 MATERNAL IRON DEFICIENCY ANEMIA COMPLICATING PREGNANCY IN THIRD TRIMESTER: Primary | ICD-10-CM

## 2024-07-24 DIAGNOSIS — D50.9 MATERNAL IRON DEFICIENCY ANEMIA COMPLICATING PREGNANCY IN THIRD TRIMESTER: Primary | ICD-10-CM

## 2024-07-24 PROCEDURE — 59025 FETAL NON-STRESS TEST: CPT | Performed by: STUDENT IN AN ORGANIZED HEALTH CARE EDUCATION/TRAINING PROGRAM

## 2024-07-24 PROCEDURE — 76815 OB US LIMITED FETUS(S): CPT | Performed by: STUDENT IN AN ORGANIZED HEALTH CARE EDUCATION/TRAINING PROGRAM

## 2024-07-24 PROCEDURE — 76820 UMBILICAL ARTERY ECHO: CPT | Performed by: STUDENT IN AN ORGANIZED HEALTH CARE EDUCATION/TRAINING PROGRAM

## 2024-07-24 RX ORDER — FERROUS SULFATE 324(65)MG
324 TABLET, DELAYED RELEASE (ENTERIC COATED) ORAL
Qty: 30 TABLET | Refills: 5 | Status: SHIPPED | OUTPATIENT
Start: 2024-07-24

## 2024-07-24 NOTE — PROGRESS NOTES
This patient received  care under my supervision on 24 at 34w4d gestational age at [facility].  NST is reactive.  -Camila Heard MD

## 2024-07-24 NOTE — PROGRESS NOTES
Repeat Non-Stress Testing:    Patient verbalizes +FM. Pt denies ALL:               Leaking of fluid   Contractions   Vaginal bleeding   Decreased fetal movement    Patient is performing daily kick counts. Patient has no questions or concerns.   NST strip reviewed by Dr. Dr. Heard.

## 2024-07-24 NOTE — TELEPHONE ENCOUNTER
I called patient and advised her of iron panel results.  Recommend oral Fe supplementation.  Rx sent to her pharmacy.  She verbalizes understanding.

## 2024-07-26 ENCOUNTER — TELEMEDICINE (OUTPATIENT)
Dept: BEHAVIORAL/MENTAL HEALTH CLINIC | Facility: CLINIC | Age: 24
End: 2024-07-26
Payer: COMMERCIAL

## 2024-07-26 DIAGNOSIS — F93.8: Primary | ICD-10-CM

## 2024-07-26 DIAGNOSIS — F33.40 RECURRENT MAJOR DEPRESSIVE DISORDER, IN REMISSION (HCC): ICD-10-CM

## 2024-07-26 PROCEDURE — 90834 PSYTX W PT 45 MINUTES: CPT | Performed by: COUNSELOR

## 2024-07-26 NOTE — PSYCH
Virtual Regular Visit    Verification of patient location:    Patient is located at Home in the following state in which I hold an active license [place]      Assessment/Plan:    Problem List Items Addressed This Visit    None  Visit Diagnoses       Relationship problems specific to childhood and adolescence    -  Primary    Recurrent major depressive disorder, in remission (HCC)                Goals addressed in session: Goal 1          Reason for visit is No chief complaint on file.       Encounter provider Rubina Stock LPC      Recent Visits  No visits were found meeting these conditions.  Showing recent visits within past 7 days and meeting all other requirements  Today's Visits  Date Type Provider Dept   07/26/24 Telemedicine Rubina Stock LPC [facility] Therapist [address]   Showing today's visits and meeting all other requirements  Future Appointments  No visits were found meeting these conditions.  Showing future appointments within next 150 days and meeting all other requirements       The patient was identified by name and date of birth. Kamilah Ortega was informed that this is a telemedicine visit and that the visit is being conducted throughthe [employer] platform. She agrees to proceed..  My office door was closed. No one else was in the room.  She acknowledged consent and understanding of privacy and security of the video platform. The patient has agreed to participate and understands they can discontinue the visit at any time.    Patient is aware this is a billable service.     Subjective  Kamilah Ortega is a 23 y.o. female Behavioral Health Psychotherapy Progress Note    Psychotherapy Provided: Individual Psychotherapy     1. Relationship problems specific to childhood and adolescence        2. Recurrent major depressive disorder, in remission (HCC)            Goals addressed in session: Goal 1     DATA: Kamilah reports to have gotten into an argument with her  and she left the home  and spent the night with her mother. Clinician provided Kamilah with time and space to process her experience. Clinician empathized and actively listened to Kamilah. We identified the trigger's that has caused her to react in anger towards her . Clinician and Kamilah explored what is within her control and was able to determine her impulsive response. Clinician discussed utilizing coping skills to self-regulate and identified a verse from the bible that speaks about the tongue and responding in anger. We explored the meaning of it and how it can be applied to difficult encounters with her . Clinician encouraged her to utilize the verse as a form of affirmation and to regulate her mood begin to pray . We discussed her partner's need to find a role model that will encourage him and help him with managing his mood. We discussed utilizing the gratitude tips to help with engaging with each other as a couple. During this session, this clinician used the following therapeutic modalities: Engagement Strategies, Client-centered Therapy, Cognitive Processing Therapy, Mindfulness-based Strategies, Motivational Interviewing, Solution-Focused Therapy, and Supportive Psychotherapy    Substance Abuse was addressed during this session. If the client is diagnosed with a co-occurring substance use disorder, please indicate any changes in the frequency or amount of use: Kamilah denies substance abuse. Stage of change for addressing substance use diagnoses: No substance use/Not applicable    ASSESSMENT:  Kamilah Ortega presents with a Euthymic/ normal, Depressed, and Labile mood.     her affect is Normal range and intensity and Tearful, which is congruent, with her mood and the content of the session. The client has not made progress on their goals.    Kamilah reports to have experienced suicidal ideations after her difficulty with her . She denies suicidal attempts or plan, she finds her purpose for living for   "Kamilah Ortega presents with a minimal risk of suicide, none risk of self-harm, and none risk of harm to others.    For any risk assessment that surpasses a \"low\" rating, a safety plan must be developed.    A safety plan was indicated: no  If yes, describe in detail safety plan not indicated.    PLAN: Between sessions, Kamilah Ortega will continue praying, utilize the affirmations discussed, and practice deep breathing to ground herself. At the next session, the therapist will use Engagement Strategies, Client-centered Therapy, Cognitive Processing Therapy, Mindfulness-based Strategies, Motivational Interviewing, Solution-Focused Therapy, and Supportive Psychotherapy to address  her anger outbursts.    Behavioral Health Treatment Plan and Discharge Planning: Kamilah Ortega is aware of and agrees to continue to work on their treatment plan. They have identified and are working toward their discharge goals. yes    Visit start and stop times:    24  Start Time: 1103  Stop Time: 1154  Total Visit Time: 51 minutes .      HPI     Past Medical History:   Diagnosis Date    Asthma     Depression        Past Surgical History:   Procedure Laterality Date    [place]  DELIVERY ONLY N/A 2021    Procedure:  SECTION ();  Surgeon: Sari SAPP MD;  [facility]: [facility];  Service: Obstetrics       Current Outpatient Medications   Medication Sig Dispense Refill    albuterol (ProAir HFA) 90 mcg/act inhaler Inhale 2 puffs every 6 (six) hours as needed for wheezing 8 g 3    famotidine (PEPCID) 20 mg tablet Take 1 tablet (20 mg total) by mouth daily 60 tablet 1    ferrous sulfate 324 (65 Fe) mg Take 1 tablet (324 mg total) by mouth daily before breakfast 30 tablet 5    Prenatal Vit-Fe Fumarate-FA (Prenatal Plus Vitamin/Mineral) 27-1 MG TABS Take 1 tablet by mouth in the morning 90 tablet 4     No current facility-administered medications for this visit.        Allergies   Allergen Reactions    " Fish-Derived Products - Food Allergy     Isoflavones (Soy)     Peanut Oil - Food Allergy        Review of Systems    Video Exam    There were no vitals filed for this visit.    Physical Exam

## 2024-07-31 ENCOUNTER — ULTRASOUND (OUTPATIENT)
Dept: PERINATAL CARE | Facility: CLINIC | Age: 24
End: 2024-07-31
Payer: COMMERCIAL

## 2024-07-31 VITALS
HEART RATE: 105 BPM | SYSTOLIC BLOOD PRESSURE: 112 MMHG | BODY MASS INDEX: 31.15 KG/M2 | WEIGHT: 165 LBS | DIASTOLIC BLOOD PRESSURE: 74 MMHG | HEIGHT: 61 IN

## 2024-07-31 DIAGNOSIS — O36.5930 POOR FETAL GROWTH AFFECTING MANAGEMENT OF MOTHER IN THIRD TRIMESTER, SINGLE OR UNSPECIFIED FETUS: ICD-10-CM

## 2024-07-31 DIAGNOSIS — Z3A.35 35 WEEKS GESTATION OF PREGNANCY: ICD-10-CM

## 2024-07-31 DIAGNOSIS — Z33.1 PREGNANT STATE, INCIDENTAL: Primary | ICD-10-CM

## 2024-07-31 PROCEDURE — 59025 FETAL NON-STRESS TEST: CPT | Performed by: OBSTETRICS & GYNECOLOGY

## 2024-07-31 PROCEDURE — 76820 UMBILICAL ARTERY ECHO: CPT | Performed by: OBSTETRICS & GYNECOLOGY

## 2024-07-31 PROCEDURE — 76815 OB US LIMITED FETUS(S): CPT | Performed by: OBSTETRICS & GYNECOLOGY

## 2024-07-31 NOTE — LETTER
July 31, 2024     Adi Arvizu MD  [address]  [address]  [facility] [address]    Patient: Kamilah Ortega   YOB: 2000   Date of Visit: 7/31/2024       Dear Dr. Arvizu:    Thank you for referring Kamilah Ortega to me for evaluation. Below are my notes for this consultation.    If you have questions, please do not hesitate to call me. I look forward to following your patient along with you.         Sincerely,        Jeanette Perez MD        CC: No Recipients

## 2024-08-04 ENCOUNTER — OFFICE VISIT (OUTPATIENT)
Dept: URGENT CARE | Facility: MEDICAL CENTER | Age: 24
End: 2024-08-04
Payer: COMMERCIAL

## 2024-08-04 VITALS
BODY MASS INDEX: 31 KG/M2 | SYSTOLIC BLOOD PRESSURE: 105 MMHG | TEMPERATURE: 98.9 F | OXYGEN SATURATION: 99 % | HEIGHT: 61 IN | HEART RATE: 108 BPM | DIASTOLIC BLOOD PRESSURE: 63 MMHG | WEIGHT: 164.2 LBS

## 2024-08-04 DIAGNOSIS — H60.501 ACUTE OTITIS EXTERNA OF RIGHT EAR, UNSPECIFIED TYPE: Primary | ICD-10-CM

## 2024-08-04 PROCEDURE — 99213 OFFICE O/P EST LOW 20 MIN: CPT | Performed by: NURSE PRACTITIONER

## 2024-08-04 RX ORDER — OFLOXACIN 3 MG/ML
10 SOLUTION AURICULAR (OTIC) DAILY
Qty: 10 ML | Refills: 0 | Status: SHIPPED | OUTPATIENT
Start: 2024-08-04 | End: 2024-08-11 | Stop reason: SDUPTHER

## 2024-08-04 NOTE — PROGRESS NOTES
[facility]        NAME: Kamilah Ortega is a 23 y.o. female  : 2000    MRN: [id number]  DATE: 2024  TIME: 3:32 PM    Assessment and Plan   Acute otitis externa of right ear, unspecified type [H60.501]  1. Acute otitis externa of right ear, unspecified type  ofloxacin (FLOXIN) 0.3 % otic solution            Patient Instructions   Restart allergy medicine  Start ofloxacin ear drops, 10 drops to right ear once daily  Can use a warm compress on ear to help with discomfort  Follow up with PCP in 3-5 days.  Proceed to  ER if symptoms worsen.    If tests are performed, our office will contact you with results only if changes need to made to the care plan discussed with you at the visit. [facility]    Chief Complaint     Chief Complaint   Patient presents with    Earache     Patient reports of right ear pain for the last 3 days          History of Present Illness       HPI  Started with right ear discomfort 7 days ago.  Has been removing wax from her ear.  No concerns with left ear. Denies any other symptoms including congestion, sore throat, cough or PND.  Does typically use allergy medication but hasn't been taking it because she does go out much.   Denies CP and SOB  36 weeks pregnant.  Review of Systems   Review of Systems   Constitutional:  Negative for activity change, appetite change, fatigue and fever.   HENT:  Positive for ear discharge and ear pain. Negative for congestion, hearing loss, postnasal drip, rhinorrhea, sinus pressure, sinus pain, sneezing, sore throat and trouble swallowing.    Respiratory:  Negative for cough, chest tightness and shortness of breath.    Cardiovascular:  Negative for chest pain.   Neurological:  Negative for dizziness, light-headedness and headaches.   All other systems reviewed and are negative.        Current Medications       Current Outpatient Medications:     ofloxacin (FLOXIN) 0.3 % otic solution,  "Administer 10 drops to the right ear daily, Disp: 10 mL, Rfl: 0    albuterol (ProAir HFA) 90 mcg/act inhaler, Inhale 2 puffs every 6 (six) hours as needed for wheezing, Disp: 8 g, Rfl: 3    famotidine (PEPCID) 20 mg tablet, Take 1 tablet (20 mg total) by mouth daily, Disp: 60 tablet, Rfl: 1    ferrous sulfate 324 (65 Fe) mg, Take 1 tablet (324 mg total) by mouth daily before breakfast, Disp: 30 tablet, Rfl: 5    Prenatal Vit-Fe Fumarate-FA (Prenatal Plus Vitamin/Mineral) 27-1 MG TABS, Take 1 tablet by mouth in the morning, Disp: 90 tablet, Rfl: 4    Current Allergies     Allergies as of 2024 - Reviewed 2024   Allergen Reaction Noted    Fish-derived products - food allergy  2017    Isoflavones (soy)  2017    Peanut oil - food allergy  2017            The following portions of the patient's history were reviewed and updated as appropriate: allergies, current medications, past family history, past medical history, past social history, past surgical history and problem list.     Past Medical History:   Diagnosis Date    Asthma     Depression        Past Surgical History:   Procedure Laterality Date    [place]  DELIVERY ONLY N/A 2021    Procedure:  SECTION ();  Surgeon: Sari SAPP MD;  Location[facility];  Service: Obstetrics       Family History   Problem Relation Age of Onset    Diabetes Mother     Fibromyalgia Mother     Hypertension Mother     Anemia Mother     Hypertension Father     Asthma Sister     Schizophrenia Brother     Cancer Maternal Grandmother     Breast cancer Neg Hx     Colon cancer Neg Hx     Ovarian cancer Neg Hx          Medications have been verified.        Objective   /63   Pulse (!) 108   Temp 98.9 °F (37.2 °C)   Ht 5' 1\" (1.549 m)   Wt 74.5 kg (164 lb 3.2 oz)   LMP 2023 Comment: US estimates 5 weeks 1 day gestation  SpO2 99%   BMI 31.03 kg/m²        Physical Exam     Physical Exam  Vitals and nursing note reviewed. "   Constitutional:       General: She is not in acute distress.     Appearance: Normal appearance. She is not ill-appearing.   HENT:      Head: Normocephalic.      Right Ear: Tenderness present. There is mastoid tenderness. Tympanic membrane is not injected, perforated, erythematous or bulging.      Left Ear: Tympanic membrane, ear canal and external ear normal.   Cardiovascular:      Rate and Rhythm: Normal rate and regular rhythm.      Pulses: Normal pulses.      Heart sounds: Normal heart sounds.   Pulmonary:      Effort: Pulmonary effort is normal.      Breath sounds: Normal breath sounds.   Musculoskeletal:      Right lower leg: No edema.      Left lower leg: No edema.   Neurological:      Mental Status: She is alert and oriented to person, place, and time.   Psychiatric:         Mood and Affect: Mood normal.         Behavior: Behavior normal.

## 2024-08-04 NOTE — PATIENT INSTRUCTIONS
Restart allergy medicine  Start ofloxacin ear drops, 10 drops to right ear once daily  Can use a warm compress on ear to help with discomfort  Follow up with PCP in 3-5 days.  Proceed to  ER if symptoms worsen.

## 2024-08-07 PROBLEM — Z3A.36 36 WEEKS GESTATION OF PREGNANCY: Status: ACTIVE | Noted: 2024-02-17

## 2024-08-08 ENCOUNTER — ULTRASOUND (OUTPATIENT)
Dept: PERINATAL CARE | Facility: CLINIC | Age: 24
End: 2024-08-08
Payer: COMMERCIAL

## 2024-08-08 ENCOUNTER — TELEPHONE (OUTPATIENT)
Dept: OBGYN CLINIC | Facility: CLINIC | Age: 24
End: 2024-08-08

## 2024-08-08 VITALS
HEIGHT: 61 IN | WEIGHT: 166.2 LBS | BODY MASS INDEX: 31.38 KG/M2 | DIASTOLIC BLOOD PRESSURE: 60 MMHG | SYSTOLIC BLOOD PRESSURE: 112 MMHG | HEART RATE: 113 BPM

## 2024-08-08 DIAGNOSIS — Z3A.36 36 WEEKS GESTATION OF PREGNANCY: Primary | ICD-10-CM

## 2024-08-08 DIAGNOSIS — O36.5930 POOR FETAL GROWTH AFFECTING MANAGEMENT OF MOTHER IN THIRD TRIMESTER, SINGLE OR UNSPECIFIED FETUS: ICD-10-CM

## 2024-08-08 PROCEDURE — 59025 FETAL NON-STRESS TEST: CPT | Performed by: OBSTETRICS & GYNECOLOGY

## 2024-08-08 PROCEDURE — 99212 OFFICE O/P EST SF 10 MIN: CPT | Performed by: OBSTETRICS & GYNECOLOGY

## 2024-08-08 PROCEDURE — 76816 OB US FOLLOW-UP PER FETUS: CPT | Performed by: OBSTETRICS & GYNECOLOGY

## 2024-08-08 NOTE — PROGRESS NOTES
Repeat Non-Stress Testing:    Patient verbalizes +FM. Pt denies ALL:               Leaking of fluid   Contractions   Vaginal bleeding   Decreased fetal movement    Patient is performing daily kick counts. Patient has no questions or concerns.   NST strip reviewed by Dr. Cadena.

## 2024-08-08 NOTE — LETTER
"2024    AL Staples  [address] [address]    Patient: Kamilah Ortega   YOB: 2000   Date of Visit: 2024   Gestational age 36w5d   Nature of this communication: Routine though please note no longer FGR! EFW 40%ile.        Dear Rhoda Mack,    This patient was seen recently in our  office.  The content of my evaluation today is in the ultrasound report under \"OB Procedures\" tab.     Please don't hesitate to contact our office with any concerns or questions.     Sincerely,      Kandi Cadena MD  Attending Physician, Maternal-Fetal Medicine  [facility]      "

## 2024-08-09 ENCOUNTER — PATIENT MESSAGE (OUTPATIENT)
Dept: OBGYN CLINIC | Facility: CLINIC | Age: 24
End: 2024-08-09

## 2024-08-09 ENCOUNTER — TELEMEDICINE (OUTPATIENT)
Dept: BEHAVIORAL/MENTAL HEALTH CLINIC | Facility: CLINIC | Age: 24
End: 2024-08-09
Payer: COMMERCIAL

## 2024-08-09 DIAGNOSIS — F93.8: Primary | ICD-10-CM

## 2024-08-09 DIAGNOSIS — F33.40 RECURRENT MAJOR DEPRESSIVE DISORDER, IN REMISSION (HCC): ICD-10-CM

## 2024-08-09 PROCEDURE — 90834 PSYTX W PT 45 MINUTES: CPT | Performed by: COUNSELOR

## 2024-08-09 NOTE — PSYCH
Virtual Regular Visit    Verification of patient location:    Patient is located at Home in the following state in which I hold an active license [place]      Assessment/Plan:    Problem List Items Addressed This Visit    None  Visit Diagnoses       Relationship problems specific to childhood and adolescence    -  Primary    Recurrent major depressive disorder, in remission (HCC)                Goals addressed in session: Goal 1          Reason for visit is No chief complaint on file.       Encounter provider Rubina Stock LPC      Recent Visits  No visits were found meeting these conditions.  Showing recent visits within past 7 days and meeting all other requirements  Today's Visits  Date Type Provider Dept   08/09/24 Telemedicine Rubina Stock LPC [facility] Therapist [address]   Showing today's visits and meeting all other requirements  Future Appointments  No visits were found meeting these conditions.  Showing future appointments within next 150 days and meeting all other requirements       The patient was identified by name and date of birth. Kamilah Ortega was informed that this is a telemedicine visit and that the visit is being conducted throughthe [email or web address] platform. She agrees to proceed..  My office door was closed. No one else was in the room.  She acknowledged consent and understanding of privacy and security of the video platform. The patient has agreed to participate and understands they can discontinue the visit at any time.    Patient is aware this is a billable service.     Subjective  Kamilah Ortega is a 23 y.o. female Behavioral Health Psychotherapy Progress Note    Psychotherapy Provided: Individual Psychotherapy     1. Relationship problems specific to childhood and adolescence        2. Recurrent major depressive disorder, in remission (HCC)            Goals addressed in session: Goal 1     DATA:  Kamilah reports her and her baby's health is going well. She reports things in the home  "have been bumpy. Kamilah has been able to identify her husbands efforts within the home and engaging with her. She reports however she's been finding hard to acknowledge his efforts. She reported she's gotten to the point of checking his iCloud and finding things she didn't expect. She reported to have discussed her findings with him, clinician commended her for being honest and not impulsively verbally attacking him. We discussed and identified what about her behaviors can be changed and explored ways to change them. Clinician also reminded her, she can only change herself and cannot change him, and focus on what's within her control. Clinician also reminded Kamilah that her pregnancy and the hormonal changes she's experience may also affect her response and emotions. Clinician emphasized the need for self-compassion and self-care. During this session, this clinician used the following therapeutic modalities: Engagement Strategies, Client-centered Therapy, Cognitive Processing Therapy, Mindfulness-based Strategies, Motivational Interviewing, Solution-Focused Therapy, and Supportive Psychotherapy    Substance Abuse was not addressed during this session. If the client is diagnosed with a co-occurring substance use disorder, please indicate any changes in the frequency or amount of use: Kamilah denies substance abuse. Stage of change for addressing substance use diagnoses: No substance use/Not applicable    ASSESSMENT:  Kamilah Ortega presents with a Euthymic/ normal mood.     her affect is Normal range and intensity, which is congruent, with her mood and the content of the session. The client has made progress on their goals.    Kamilah denies suicidal/homicidal ideations or self injurious behaviors.  Kamilah Ortega presents with a none risk of suicide, none risk of self-harm, and none risk of harm to others.    For any risk assessment that surpasses a \"low\" rating, a safety plan must be developed.    A safety plan " was indicated: no  If yes, describe in detail safety plan not indicated.    PLAN: Between sessions, Kamilah Ortega will utilize coping skills to regulate her anxiety and count to ten before responding to her . At the next session, the therapist will use Engagement Strategies, Client-centered Therapy, Cognitive Behavioral Therapy, Cognitive Processing Therapy, Mindfulness-based Strategies, Motivational Interviewing, Solution-Focused Therapy, and Supportive Psychotherapy to address her anxiety and impulsive responses towards her .    Behavioral Health Treatment Plan and Discharge Planning: Kamilah Ortega is aware of and agrees to continue to work on their treatment plan. They have identified and are working toward their discharge goals. yes    Visit start and stop times:    24  Start Time: 1120  Stop Time: 1212  Total Visit Time: 52 minutes .      HPI     Past Medical History:   Diagnosis Date    Asthma     Depression        Past Surgical History:   Procedure Laterality Date    [place]  DELIVERY ONLY N/A 2021    Procedure:  SECTION ();  Surgeon: Sari SAPP MD;  Location: [facility];  Service: Obstetrics       Current Outpatient Medications   Medication Sig Dispense Refill    albuterol (ProAir HFA) 90 mcg/act inhaler Inhale 2 puffs every 6 (six) hours as needed for wheezing 8 g 3    famotidine (PEPCID) 20 mg tablet Take 1 tablet (20 mg total) by mouth daily 60 tablet 1    ferrous sulfate 324 (65 Fe) mg Take 1 tablet (324 mg total) by mouth daily before breakfast 30 tablet 5    ofloxacin (FLOXIN) 0.3 % otic solution Administer 10 drops to the right ear daily 10 mL 0    Prenatal Vit-Fe Fumarate-FA (Prenatal Plus Vitamin/Mineral) 27-1 MG TABS Take 1 tablet by mouth in the morning 90 tablet 4     No current facility-administered medications for this visit.        Allergies   Allergen Reactions    Fish-Derived Products - Food Allergy     Isoflavones (Soy)     Peanut Oil  - Food Allergy        Review of Systems    Video Exam    There were no vitals filed for this visit.    Physical Exam

## 2024-08-11 DIAGNOSIS — H60.501 ACUTE OTITIS EXTERNA OF RIGHT EAR, UNSPECIFIED TYPE: ICD-10-CM

## 2024-08-11 RX ORDER — OFLOXACIN 3 MG/ML
10 SOLUTION AURICULAR (OTIC) DAILY
Qty: 1.5 ML | Refills: 0 | Status: SHIPPED | OUTPATIENT
Start: 2024-08-11 | End: 2024-08-15

## 2024-08-12 NOTE — PATIENT COMMUNICATION
PT called inquiring about the FMLA papers that was given before to Provider and PT send them via this message.  I printed them out and gave to Camila CRESPO and explained to PT printed and await for office personnel to contact  her that the forms were filled out.  PT understood.

## 2024-08-14 ENCOUNTER — TELEPHONE (OUTPATIENT)
Dept: PSYCHIATRY | Facility: CLINIC | Age: 24
End: 2024-08-14

## 2024-08-14 NOTE — TELEPHONE ENCOUNTER
Left voicemail informing patient and/or parent/guardian of the Psych Encounter form needing to be signed as a requirement from the insurance company for billing purposes. Patient can access form via 2threads and sign electronically.     Please make patient aware this form must be signed for each visit as a requirement to continue future visits with provider.    Encounter form 8/9

## 2024-08-15 ENCOUNTER — ROUTINE PRENATAL (OUTPATIENT)
Dept: OBGYN CLINIC | Facility: CLINIC | Age: 24
End: 2024-08-15

## 2024-08-15 VITALS
WEIGHT: 168.4 LBS | HEART RATE: 103 BPM | DIASTOLIC BLOOD PRESSURE: 79 MMHG | BODY MASS INDEX: 31.82 KG/M2 | SYSTOLIC BLOOD PRESSURE: 117 MMHG

## 2024-08-15 DIAGNOSIS — Z34.93 PRENATAL CARE IN THIRD TRIMESTER: Primary | ICD-10-CM

## 2024-08-15 DIAGNOSIS — Z3A.37 37 WEEKS GESTATION OF PREGNANCY: ICD-10-CM

## 2024-08-15 PROCEDURE — 99213 OFFICE O/P EST LOW 20 MIN: CPT | Performed by: NURSE PRACTITIONER

## 2024-08-15 PROCEDURE — 87150 DNA/RNA AMPLIFIED PROBE: CPT | Performed by: NURSE PRACTITIONER

## 2024-08-15 PROCEDURE — 87491 CHLMYD TRACH DNA AMP PROBE: CPT | Performed by: NURSE PRACTITIONER

## 2024-08-15 PROCEDURE — 87591 N.GONORRHOEAE DNA AMP PROB: CPT | Performed by: NURSE PRACTITIONER

## 2024-08-15 NOTE — PROGRESS NOTES
Kamilah Ortega presents today for routine OB visit at 37w5d.  Blood Pressure: 117/79  Wt=76.4 kg (168 lb 6.4 oz); Body mass index is 31.82 kg/m².; TWG=16.1 kg (35 lb 6.4 oz)  Fetal Heart Rate: 134; Fundal Height (cm): 38 cm  SVE today: Cervical Dilation: 1-2 /Cervical Effacement: 50 /Fetal Station: Ballotable  Abdomen: gravid, soft, non-tender.  She reports no complaints.  Reports irregular uterine contractions.  Denies vaginal bleeding or leaking of fluid.  Reports adequate fetal movement of at least 10 movements in 2 hours once daily.  Reviewed labor precautions and fetal kick counts as well as pre-eclampsia warning signs.  Reviewed perineal massage for decreasing risk of perineal lacerations during delivery.  Advised to continue medications and return in 1 week.      Current Outpatient Medications   Medication Instructions    albuterol (ProAir HFA) 90 mcg/act inhaler 2 puffs, Inhalation, Every 6 hours PRN    famotidine (PEPCID) 20 mg, Oral, Daily    ferrous sulfate 324 mg, Oral, Daily before breakfast    Prenatal Vit-Fe Fumarate-FA (Prenatal Plus Vitamin/Mineral) 27-1 MG TABS 1 tablet, Oral, Daily       Laboratory workup: initial OB labs (done 2/12/24); 28 week labs (done 7/16/24); 36 week cultures (done 8/15/24)    Genetic Screening: NIPS negative, MSAFP not done    Vaccinations: influenza (given 10/1/23); Tdap (given 6/11/24); RSV (not indicated outside RSV season); COVID-19 (rec'd 1/18/22 & 2/14/22)    Postpartum contraception: desires Mirena IUD    Fetal Ultrasounds:  1/15/24 (7w3d) EDC confirmed  2/21/24 (12w4d) NT WNL  4/5/24 (18w6d) CL=3.86cm  4/23/24 (21w3d) no previa, CL=3.67cm, tiffany WNL w/missed views  5/7/24 (23w3d) CL=4.19cm, tiffany WNL & complete  7/9/24 (32w3d) EFW=11%, AC=3%, ARNAV=10.9cm, UA dopps WNL, vertex  7/19/24 (33w6d) ARNAV=14.1cm, UA dopps WNL, vertex  7/24/24 (34w4d) ARNAV=17.9cm, UA dopps WNL  7/31/24 (35w4d) ARNAV=16.9cm, UA dopps WNL  8/8/24 (36w5d) EFW=40%, AC=56%, ARNAV=13.5cm, vertex      G2  Problems (from 01/15/24 to present)       Problem Noted Resolved    Maternal anemia 7/23/2024 by AL Staples No    Overview Addendum 8/15/2024  2:09 PM by AL Staples     Hgb trends   1/2/24=11.4   2/12/24=11.3   7/16/24=10.2 (MCV=88)  Needs Fe studies - done 7/23/24 (ferritin=16)  Needs oral Fe supplementation - taking         IUGR 7/9/2024 by Leoncio Scott MD No    Overview Addendum 8/8/2024  7:16 PM by AL Staples     Noted @32 weeks  Needs weekly NST/ARNAV/UA dopplers w/MFM - done  Serial growth scans - done  RESOLVED @36 weeks         Hx PTD @36w 2/21/2024 by AL Huffman No    Overview Addendum 5/13/2024  3:39 PM by AL Staples     PROM @36w6d  Needs MFM consultation - done  Serial CL measurements - done WNL         History of c/s x1 2/17/2024 by AL Staples No    Overview Signed 2/17/2024 11:04 AM by AL Staples     2021 Op note documents LTV uterine incision; indication breech presentation  Desires TOLAC         Family hx DM 2/17/2024 by AL Staples No    Overview Signed 2/17/2024 11:05 AM by AL Staples     Pt's mother w/DM  Needs early GDM screen - done WNL         Asthma during pregnancy 2/17/2024 by AL Staples No    Overview Signed 2/17/2024 11:08 AM by AL Staples     Albuterol prn         Not immune to HBV 2/13/2024 by AL Staples No    Overview Addendum 5/13/2024  3:39 PM by AL Staples     Needs HBV vaccine booster

## 2024-08-15 NOTE — PATIENT INSTRUCTIONS
Thank you for your confidence in our team.   We appreciate you and welcome your feedback.   If you receive a survey from us, please take a few moments to let us know how we are doing.   Sincerely,  AL Staples       LABOR PRECAUTIONS  Call our office at [phone] for any of the following:    * I need to call immediately I if I have even a small amount of LIQUID  leaking from my vagina, with or without contractions.   * I need to call if I am BLEEDING an amount equal to or more than a period.  A small amount of bloody vaginal discharge is normal at the end of the pregnancy.   * I need to call if I am having CONTRACTIONS  every five minutes for at least an hour.  I will need a watch in order to time them.  I should time them from the beginning of one contraction until the beginning of the next one.   * I need to call BEFORE  I go to the hospital.   * I need to have a plan for TRANSPORTATION  to get to the hospital when I am in labor.  Labor is generally not an emergency which requires an ambulance.          FETAL KICK COUNTS    In the third trimester (after 28 weeks gestation) you should be performing fetal kick counts every day.  Your baby should move at least 10 times in 2 hours during an active time, once a day.    Choose atime of day when your baby is most active.  Try to do this around the same time each day.  Get into a comfortable position and then write down the time your baby first moves.  Count each movement until the baby moves 10 times.  These movements include kicks, punches, nudges, flutters, or rolls.  This can take anywhere from 5 minutes to 2 hours.  Write down the time you feel the baby's 10th movement.    If 2 hours has passed and your baby has not moved at least 10 times, you should CALL THE OFFICE RIGHT AWAY.  [phone]        PERINEAL / VAGINAL MASSAGE    What can I do now to decrease my chances of tearing during delivery?  Massaging around the vaginal opening by you (or your  partner), either antepartum (before birth) or during the second stage of labor, can reduce the likelihood of perineal tearing during childbirth.  Likewise, the use of warm packs held on the perineum during the pushing stage of labor can reduce the severity of your tear.  This will happen during the pushing stage of labor.  At home, you can also help reduce the chances of injury that may occur during the birth of your child through perineal massage.    When should I do this?  Starting around or shortly after 34 weeks of pregnancy, you or your partner should provide 5-10 minutes of vaginal massage 1-4 times per week.    How?  Use either almond, coconut, or olive oil and water mixture on 1 or 2 fingers (depending on comfort).  Insert finger(s) 3-5cm into the vagina.  Apply sweeping downward/sideward pressure from 3 to 9 o'clock for 5-10 minutes, 1-4 times per week.        GROUP B STREP    Group B Strep (GBS) is a common vaginal bacteria.  Approximately 25% of women normally have GBS bacteria present in the vagina.  It is NOT a sexually-transmitted infection.  In fact, it is not an infection AT ALL!  It is just a normal vaginal bacteria for many women.    However, the GBS bacteria can be dangerous to a  baby if the baby is exposed to that particular bacteria during labor and birth AND develops an infection from it.  The likelihood of a  GBS infection for a woman who has GBS bacteria in the vagina is about 1%-2%.  But if it does occur, a baby could become severely ill.    For this reason, we do a vaginal culture (Q-tip swab of the vagina and rectum) for ALL pregnant women at approximately 36 weeks of pregnancy.  If the culture shows that there is GBS bacteria present, it is NOT a reason to panic!  Because in this situation we will give this woman antibiotics through her IV while she is in labor.  When a mother is treated with antibiotics during labor and delivery, her baby ALMOST NEVER becomes infected with  GBS bacteria.

## 2024-08-16 LAB
C TRACH DNA SPEC QL NAA+PROBE: NEGATIVE
N GONORRHOEA DNA SPEC QL NAA+PROBE: NEGATIVE

## 2024-08-19 LAB — GP B STREP DNA SPEC QL NAA+PROBE: NEGATIVE

## 2024-08-21 ENCOUNTER — ANESTHESIA EVENT (INPATIENT)
Dept: ANESTHESIOLOGY | Facility: HOSPITAL | Age: 24
End: 2024-08-21
Payer: COMMERCIAL

## 2024-08-21 ENCOUNTER — ANESTHESIA (INPATIENT)
Dept: ANESTHESIOLOGY | Facility: HOSPITAL | Age: 24
End: 2024-08-21
Payer: COMMERCIAL

## 2024-08-21 ENCOUNTER — HOSPITAL ENCOUNTER (INPATIENT)
Facility: HOSPITAL | Age: 24
LOS: 2 days | Discharge: HOME | End: 2024-08-23
Attending: OBSTETRICS & GYNECOLOGY | Admitting: OBSTETRICS & GYNECOLOGY
Payer: COMMERCIAL

## 2024-08-21 DIAGNOSIS — Z3A.37 37 WEEKS GESTATION OF PREGNANCY: ICD-10-CM

## 2024-08-21 DIAGNOSIS — O34.219 VBAC (VAGINAL BIRTH AFTER CESAREAN): ICD-10-CM

## 2024-08-21 PROBLEM — O26.899 VAGINAL DISCHARGE DURING PREGNANCY: Status: ACTIVE | Noted: 2024-08-21

## 2024-08-21 PROBLEM — Z3A.38 38 WEEKS GESTATION OF PREGNANCY: Status: ACTIVE | Noted: 2024-02-17

## 2024-08-21 PROBLEM — N89.8 VAGINAL DISCHARGE DURING PREGNANCY: Status: ACTIVE | Noted: 2024-08-21

## 2024-08-21 PROBLEM — O42.90 LEAKAGE OF AMNIOTIC FLUID: Status: ACTIVE | Noted: 2024-08-21

## 2024-08-21 LAB
ABO GROUP BLD: NORMAL
BASE EXCESS BLDCOA CALC-SCNC: -9.9 MMOL/L (ref 3–11)
BASE EXCESS BLDCOV CALC-SCNC: -7.6 MMOL/L (ref 1–9)
BLD GP AB SCN SERPL QL: NEGATIVE
ERYTHROCYTE [DISTWIDTH] IN BLOOD BY AUTOMATED COUNT: 14.5 % (ref 11.6–15.1)
HCO3 BLDCOA-SCNC: 18.4 MMOL/L (ref 17.3–27.3)
HCO3 BLDCOV-SCNC: 18.4 MMOL/L (ref 12.2–28.6)
HCT VFR BLD AUTO: 35.3 % (ref 34.8–46.1)
HGB BLD-MCNC: 11.4 G/DL (ref 11.5–15.4)
HOLD SPECIMEN: NORMAL
MCH RBC QN AUTO: 28.8 PG (ref 26.8–34.3)
MCHC RBC AUTO-ENTMCNC: 32.3 G/DL (ref 31.4–37.4)
MCV RBC AUTO: 89 FL (ref 82–98)
O2 CT VFR BLDCOA CALC: 13.2 ML/DL
OXYHGB MFR BLDCOA: 66.4 %
OXYHGB MFR BLDCOV: 69.9 %
PCO2 BLDCOA: 49.8 MM[HG] (ref 30–60)
PCO2 BLDCOV: 39.5 MM HG (ref 27–43)
PH BLDCOA: 7.19 [PH] (ref 7.23–7.43)
PH BLDCOV: 7.29 [PH] (ref 7.19–7.49)
PLATELET # BLD AUTO: 141 THOUSANDS/UL (ref 149–390)
PMV BLD AUTO: 11.7 FL (ref 8.9–12.7)
PO2 BLDCOA: 32.1 MM HG (ref 5–25)
PO2 BLDCOV: 30.4 MM HG (ref 15–45)
RBC # BLD AUTO: 3.96 MILLION/UL (ref 3.81–5.12)
RH BLD: POSITIVE
SAO2 % BLDCOV: 13.5 ML/DL
SPECIMEN EXPIRATION DATE: NORMAL
TREPONEMA PALLIDUM IGG+IGM AB [PRESENCE] IN SERUM OR PLASMA BY IMMUNOASSAY: NORMAL
WBC # BLD AUTO: 9.89 THOUSAND/UL (ref 4.31–10.16)

## 2024-08-21 PROCEDURE — 82805 BLOOD GASES W/O2 SATURATION: CPT | Performed by: OBSTETRICS & GYNECOLOGY

## 2024-08-21 PROCEDURE — 86850 RBC ANTIBODY SCREEN: CPT

## 2024-08-21 PROCEDURE — 86780 TREPONEMA PALLIDUM: CPT

## 2024-08-21 PROCEDURE — 99202 OFFICE O/P NEW SF 15 MIN: CPT

## 2024-08-21 PROCEDURE — 86901 BLOOD TYPING SEROLOGIC RH(D): CPT

## 2024-08-21 PROCEDURE — 58300 INSERT INTRAUTERINE DEVICE: CPT | Performed by: OBSTETRICS & GYNECOLOGY

## 2024-08-21 PROCEDURE — 86900 BLOOD TYPING SEROLOGIC ABO: CPT

## 2024-08-21 PROCEDURE — NC001 PR NO CHARGE: Performed by: OBSTETRICS & GYNECOLOGY

## 2024-08-21 PROCEDURE — 85027 COMPLETE CBC AUTOMATED: CPT

## 2024-08-21 RX ORDER — LIDOCAINE HYDROCHLORIDE AND EPINEPHRINE 15; 5 MG/ML; UG/ML
INJECTION, SOLUTION EPIDURAL AS NEEDED
Status: DISCONTINUED | OUTPATIENT
Start: 2024-08-21 | End: 2024-08-21 | Stop reason: HOSPADM

## 2024-08-21 RX ORDER — ONDANSETRON 2 MG/ML
4 INJECTION INTRAMUSCULAR; INTRAVENOUS EVERY 8 HOURS PRN
Status: DISCONTINUED | OUTPATIENT
Start: 2024-08-21 | End: 2024-08-21

## 2024-08-21 RX ORDER — SODIUM CHLORIDE, SODIUM LACTATE, POTASSIUM CHLORIDE, CALCIUM CHLORIDE 600; 310; 30; 20 MG/100ML; MG/100ML; MG/100ML; MG/100ML
125 INJECTION, SOLUTION INTRAVENOUS CONTINUOUS
Status: DISCONTINUED | OUTPATIENT
Start: 2024-08-21 | End: 2024-08-23 | Stop reason: HOSPADM

## 2024-08-21 RX ORDER — IBUPROFEN 600 MG/1
600 TABLET, FILM COATED ORAL EVERY 6 HOURS
Status: DISCONTINUED | OUTPATIENT
Start: 2024-08-21 | End: 2024-08-23 | Stop reason: HOSPADM

## 2024-08-21 RX ORDER — ACETAMINOPHEN 325 MG/1
650 TABLET ORAL EVERY 4 HOURS PRN
Status: DISCONTINUED | OUTPATIENT
Start: 2024-08-21 | End: 2024-08-23 | Stop reason: HOSPADM

## 2024-08-21 RX ORDER — SIMETHICONE 80 MG
80 TABLET,CHEWABLE ORAL 4 TIMES DAILY PRN
Status: DISCONTINUED | OUTPATIENT
Start: 2024-08-21 | End: 2024-08-23 | Stop reason: HOSPADM

## 2024-08-21 RX ORDER — BENZOCAINE/MENTHOL 6 MG-10 MG
1 LOZENGE MUCOUS MEMBRANE DAILY PRN
Status: DISCONTINUED | OUTPATIENT
Start: 2024-08-21 | End: 2024-08-23 | Stop reason: HOSPADM

## 2024-08-21 RX ORDER — CALCIUM CARBONATE 500 MG/1
500 TABLET, CHEWABLE ORAL DAILY PRN
Status: DISCONTINUED | OUTPATIENT
Start: 2024-08-21 | End: 2024-08-21

## 2024-08-21 RX ORDER — CALCIUM CARBONATE 500 MG/1
1000 TABLET, CHEWABLE ORAL DAILY PRN
Status: DISCONTINUED | OUTPATIENT
Start: 2024-08-21 | End: 2024-08-23 | Stop reason: HOSPADM

## 2024-08-21 RX ORDER — BUPIVACAINE HYDROCHLORIDE 2.5 MG/ML
30 INJECTION, SOLUTION EPIDURAL; INFILTRATION; INTRACAUDAL ONCE AS NEEDED
Status: COMPLETED | OUTPATIENT
Start: 2024-08-21 | End: 2024-08-21

## 2024-08-21 RX ORDER — OXYTOCIN/RINGER'S LACTATE 30/500 ML
250 PLASTIC BAG, INJECTION (ML) INTRAVENOUS ONCE
Status: DISCONTINUED | OUTPATIENT
Start: 2024-08-21 | End: 2024-08-23 | Stop reason: HOSPADM

## 2024-08-21 RX ORDER — ONDANSETRON 2 MG/ML
4 INJECTION INTRAMUSCULAR; INTRAVENOUS EVERY 8 HOURS PRN
Status: DISCONTINUED | OUTPATIENT
Start: 2024-08-21 | End: 2024-08-23 | Stop reason: HOSPADM

## 2024-08-21 RX ORDER — CALCIUM CARBONATE 500 MG/1
500 TABLET, CHEWABLE ORAL 3 TIMES DAILY PRN
Status: DISCONTINUED | OUTPATIENT
Start: 2024-08-21 | End: 2024-08-21

## 2024-08-21 RX ORDER — ROPIVACAINE HYDROCHLORIDE 2 MG/ML
INJECTION, SOLUTION EPIDURAL; INFILTRATION; PERINEURAL AS NEEDED
Status: DISCONTINUED | OUTPATIENT
Start: 2024-08-21 | End: 2024-08-21 | Stop reason: HOSPADM

## 2024-08-21 RX ORDER — OXYTOCIN/RINGER'S LACTATE 30/500 ML
PLASTIC BAG, INJECTION (ML) INTRAVENOUS
Status: COMPLETED
Start: 2024-08-21 | End: 2024-08-21

## 2024-08-21 RX ORDER — ALBUTEROL SULFATE 90 UG/1
2 AEROSOL, METERED RESPIRATORY (INHALATION) EVERY 6 HOURS PRN
Status: DISCONTINUED | OUTPATIENT
Start: 2024-08-21 | End: 2024-08-23 | Stop reason: HOSPADM

## 2024-08-21 RX ADMIN — ROPIVACAINE HYDROCHLORIDE 8 MG: 2 INJECTION EPIDURAL; INFILTRATION; PERINEURAL at 08:00

## 2024-08-21 RX ADMIN — SODIUM CHLORIDE, SODIUM LACTATE, POTASSIUM CHLORIDE, AND CALCIUM CHLORIDE 125 ML/HR: .6; .31; .03; .02 INJECTION, SOLUTION INTRAVENOUS at 10:00

## 2024-08-21 RX ADMIN — ROPIVACAINE HYDROCHLORIDE 8 ML/HR: 2 INJECTION EPIDURAL; INFILTRATION; PERINEURAL at 08:09

## 2024-08-21 RX ADMIN — ROPIVACAINE HYDROCHLORIDE 5 ML: 2 INJECTION EPIDURAL; INFILTRATION; PERINEURAL at 12:07

## 2024-08-21 RX ADMIN — SODIUM CHLORIDE, SODIUM LACTATE, POTASSIUM CHLORIDE, AND CALCIUM CHLORIDE 999 ML/HR: .6; .31; .03; .02 INJECTION, SOLUTION INTRAVENOUS at 05:51

## 2024-08-21 RX ADMIN — ROPIVACAINE HYDROCHLORIDE: 2 INJECTION, SOLUTION EPIDURAL; INFILTRATION at 15:06

## 2024-08-21 RX ADMIN — CALCIUM CARBONATE (ANTACID) CHEW TAB 500 MG 500 MG: 500 CHEW TAB at 05:42

## 2024-08-21 RX ADMIN — IBUPROFEN 600 MG: 600 TABLET, FILM COATED ORAL at 23:35

## 2024-08-21 RX ADMIN — LIDOCAINE HYDROCHLORIDE AND EPINEPHRINE 3 ML: 15; 5 INJECTION, SOLUTION EPIDURAL at 07:55

## 2024-08-21 RX ADMIN — ROPIVACAINE HYDROCHLORIDE: 2 INJECTION, SOLUTION EPIDURAL; INFILTRATION at 08:30

## 2024-08-21 RX ADMIN — BUPIVACAINE HYDROCHLORIDE 30 ML: 2.5 INJECTION, SOLUTION EPIDURAL; INFILTRATION; INTRACAUDAL; PERINEURAL at 16:56

## 2024-08-21 RX ADMIN — SODIUM CHLORIDE, SODIUM LACTATE, POTASSIUM CHLORIDE, AND CALCIUM CHLORIDE 125 ML/HR: .6; .31; .03; .02 INJECTION, SOLUTION INTRAVENOUS at 15:08

## 2024-08-21 RX ADMIN — CALCIUM CARBONATE (ANTACID) CHEW TAB 500 MG 500 MG: 500 CHEW TAB at 11:23

## 2024-08-21 RX ADMIN — ONDANSETRON 4 MG: 2 INJECTION INTRAMUSCULAR; INTRAVENOUS at 12:09

## 2024-08-21 RX ADMIN — LEVONORGESTREL 1 INTRA UTERINE DEVICE: 52 INTRAUTERINE DEVICE INTRAUTERINE at 17:04

## 2024-08-21 RX ADMIN — Medication 250 UNITS: at 16:49

## 2024-08-21 NOTE — OB LABOR/OXYTOCIN SAFETY PROGRESS
Labor Progress Note - Kamilah Ortega 23 y.o. female MRN: [id number]    Unit/Bed#: -01 Encounter: [id number]       Contraction Frequency (minutes): 2-4  Contraction Intensity: Moderate/Strong  Uterine Activity Characteristics: Regular  Cervical Dilation: 6        Cervical Effacement: 80  Fetal Station: -1  Baseline Rate (FHR): 130 bpm     FHR Category: I               Vital Signs:   Vitals:    08/21/24 1249   BP: 106/66   Pulse: (!) 111   Resp:    Temp:    SpO2:        Notes/comments:   Patient progressing in labor, on epidural, w/o pitocin.  Discussed with the pt about the labor course.     Aleksander Hill MD 8/21/2024 1:04 PM

## 2024-08-21 NOTE — OB LABOR/OXYTOCIN SAFETY PROGRESS
Labor Progress Note - Kamilah Ortega 23 y.o. female MRN: [id number]    Unit/Bed#: -01 Encounter: [id number]       Contraction Frequency (minutes): 3-5  Contraction Intensity: Moderate  Uterine Activity Characteristics: Regular  Cervical Dilation: 4        Cervical Effacement: 70  Fetal Station: -2  Baseline Rate (FHR): 150 bpm     FHR Category: I               Vital Signs: Stable  Vitals:    08/21/24 0900   BP: 107/67   Pulse:    Resp:    Temp: (!) 97.2 °F (36.2 °C)   SpO2:        Notes/comments:   Patient is comfortable with epidural, category I tracing, progressing well spontaneously.  All questions answered.  Maternal repositioning with peanut ball employed.  Re-examine in 4-6 hours or sooner if indicated.     Cata Manning MD 8/21/2024 10:03 AM

## 2024-08-21 NOTE — OB LABOR/OXYTOCIN SAFETY PROGRESS
Labor Progress Note - Kamilah Ortega 23 y.o. female MRN: [id number]    Unit/Bed#: -01 Encounter: [id number]       Contraction Frequency (minutes): 1.5-4  Contraction Intensity: Moderate/Strong  Uterine Activity Characteristics: Regular  Cervical Dilation: 9        Cervical Effacement: 90  Fetal Station: 0  Baseline Rate (FHR): 140 bpm     FHR Category: I               Vital Signs:   Vitals:    08/21/24 1249   BP: 106/66   Pulse: (!) 111   Resp:    Temp:    SpO2:        Notes/comments:   Pt progressing in labor. A little uncomfortable with the contractions. Not on pitocin.       Aleksander Hill MD 8/21/2024 3:32 PM

## 2024-08-21 NOTE — ANESTHESIA PREPROCEDURE EVALUATION
Procedure:  LABOR ANALGESIA    Relevant Problems   ANESTHESIA (within normal limits)      CARDIO (within normal limits)      ENDO (within normal limits)      GI/HEPATIC (within normal limits)      /RENAL (within normal limits)      GYN   (+) 38 weeks gestation of pregnancy      HEMATOLOGY   (+) Maternal anemia      MUSCULOSKELETAL (within normal limits)      NEURO/PSYCH (within normal limits)      PULMONARY   (+) Asthma during pregnancy        Physical Exam    Airway    Mallampati score: II         Dental   No notable dental hx     Cardiovascular  Rhythm: regular, Rate: normal, Cardiovascular exam normal    Pulmonary  Pulmonary exam normal Breath sounds clear to auscultation    Other Findings  post-pubertal.      Anesthesia Plan  ASA Score- 2     Anesthesia Type- epidural with ASA Monitors.         Additional Monitors:     Airway Plan:            Plan Factors-Exercise tolerance (METS): >4 METS.    Chart reviewed.   Existing labs reviewed. Patient summary reviewed.    Patient is not a current smoker.              Induction-     Postoperative Plan-     Perioperative Resuscitation Plan - Level 1 - Full Code.       Informed Consent- Anesthetic plan and risks discussed with patient.

## 2024-08-21 NOTE — L&D DELIVERY NOTE
OBGYN Vaginal Delivery Summary  Kamilah Ortega 23 y.o. female MRN: [id number]  Unit/Bed#: -01 Encounter: [id number]    Predelivery Diagnosis:  1. Pregnancy at 38w4d  2. Need of contraception - Desires PP Mirena    Postdelivery Diagnosis:  1. Same as above  2. Delivery of term   3. PP Mirena placement     Procedure:  + PP Mirena, repair of 1st degree laceration(s)    Attending: Dr. Manning    Assistant: Dr. Aleksander Dominguez    Anesthesia: Epidural    QBL: 234 mL  Admission H.4 g/dL  Admission platelets: 141 thousands/uL    Complications: none apparent    Specimens: cord blood, arterial and venous cord blood gases, placenta to storage    Findings:   1. Viable female at 1648, with APGARS of 8 and 9 at 1 and 5 minutes respectively. Weight pending at time of dictation.  2. Spontaneous delivery of intact placenta at 1653. Central insertion three vessel umbilical cord  3. 1st degree laceration repaired with 3-0 Coated Vicryl  4. Blood gases:  Umbilical Cord Venous Blood Gas:  Results from last 7 days   Lab Units 24  1652   PH COV  7.287   PCO2 COV mm HG 39.5   HCO3 COV mmol/L 18.4   BASE EXC COV mmol/L -7.6*   O2 CT CD VB mL/dL 13.5   O2 HGB, VENOUS CORD % 69.9     Umbilical Cord Arterial Blood Gas:  Results from last 7 days   Lab Units 24  1652   PH COA  7.186*   PCO2 COA  49.8   PO2 COA mm HG 32.1*   HCO3 COA mmol/L 18.4   BASE EXC COA mmol/L -9.9*   O2 CONTENT CORD ART ml/dl 13.2   O2 HGB, ARTERIAL CORD % 66.4       Disposition:  Patient tolerated the procedure well and was recovering in labor and delivery room.    Brief history and labor course:  Kamilah Ortega is a 23 y.o.  at 38wk4d. She presented to labor and delivery was initially admitted for labor after spontaneous rupture of membranes at home. She was 2/70/-3 on arrival. She received an epidural for pain control. She progressed to complete dilation and began pushing.     Description of procedure  After pushing for 42min,  the patient delivered a viable female  at 1648 on 2024, weight pending at time of dictation, apgars of 8 (1 min) and 9 (5 min). The fetal vertex delivered spontaneously. Baby restituted  to ROT. There was no nuchal cord. The anterior (left) shoulder delivered atraumatically with maternal expulsive forces and the assistance of gentle downward traction. The posterior shoulder delivered with maternal expulsive forces and the assistance of gentle upward traction. The remainder of the fetus delivered spontaneously.     Upon delivery the infant was placed on the mother's abdomen and delayed cord clamping was performed. The umbilical cord was then doubly clamped and cut. The infant was noted to cry spontaneously and was moving all extremities appropriately. There was no evidence for injury. Awaiting nurse resuscitators evaluated the . Arterial and venous cord blood gases and cord blood were collected for analysis and were promptly sent to the lab. In the immediate post-partum, IV pitocin was administered, and the uterus was noted to contract down well with massage and pitocin. The placenta delivered spontaneously at 1653 and was noted to be intact and had a centrally inserted 3 vessel cord. The placenta was sent to storage.    At this time patient confirmed her desire for  long acting contraception via a post-placental Mirena. Device was in the applicator, it was then advanced to the uterine fundus under ultrasound guidance.and successfully placed. Strings were trimmed to approximately 3cm from the cervical os.       The vagina, cervix, perineum, and rectum were inspected. 1st degree laceration(s) were noted. The laceration was infiltrated with Lidocaine pure 0.25% and the repair was completed with 3-0 Coated Vicryl.    At the conclusion of the procedure, all needle, sponge, and instrument counts were noted to be correct. Patient tolerated the procedure well and was allowed to recover in labor and  delivery room with family and  before being transferred to the post-partum floor.     Dr. Manning was present and participated in all key portions of the case.    Aleksander Hill MD  2024  5:25 PM

## 2024-08-21 NOTE — PLAN OF CARE
Problem: BIRTH - VAGINAL/ SECTION  Goal: Fetal and maternal status remain reassuring during the birth process  Description: INTERVENTIONS:  - Monitor vital signs  - Monitor fetal heart rate  - Monitor uterine activity  - Monitor labor progression (vaginal delivery)  - DVT prophylaxis  - Antibiotic prophylaxis  Outcome: Progressing  Goal: Emotionally satisfying birthing experience for mother/fetus  Description: Interventions:  - Assess, plan, implement and evaluate the nursing care given to the patient in labor  - Advocate the philosophy that each childbirth experience is a unique experience and support the family's chosen level of involvement and control during the labor process   - Actively participate in both the patient's and family's teaching of the birth process  - Consider cultural, Orthodox and age-specific factors and plan care for the patient in labor  Outcome: Progressing     Problem: Knowledge Deficit  Goal: Verbalizes understanding of labor plan  Description: Assess patient/family/caregiver's baseline knowledge level and ability to understand information.  Provide education via patient/family/caregiver's preferred learning method at appropriate level of understanding.     1. Provide teaching at level of understanding.  2. Provide teaching via preferred learning method(s).  Outcome: Progressing     Problem: Labor & Delivery  Goal: Manages discomfort  Description: Assess and monitor for signs and symptoms of discomfort.  Assess patient's pain level regularly and per hospital policy.  Administer medications as ordered. Support use of nonpharmacological methods to help control pain such as distraction, imagery, relaxation, and application of heat and cold.  Collaborate with interdisciplinary team and patient to determine appropriate pain management plan.    1. Include patient in decisions related to comfort.  2. Offer non-pharmacological pain management interventions.  3. Report ineffective pain  management to physician.  Outcome: Progressing  Goal: Patient vital signs are stable  Description: 1. Assess vital signs - vaginal delivery.  Outcome: Progressing

## 2024-08-21 NOTE — DISCHARGE SUMMARY
Discharge Summary - OB/GYN   Kamilah Ortega 23 y.o. female MRN: [id number]  Unit/Bed#: -01 Encounter: [id number]      Admission Date: 2024     Discharge Date: 2024    Admitting Diagnosis:   Patient Active Problem List   Diagnosis    Not immune to HBV    38 weeks gestation of pregnancy    History of c/s x1    Family hx DM    Asthma during pregnancy    Hx PTD @36w    IUGR    Maternal anemia     (vaginal birth after )    Elevated blood pressure reading without diagnosis of hypertension        Discharge Diagnosis:   Same, delivered  Delivery of term baby  S/p placental Mirena    Procedures: vaginal birth after  ()    Delivery Attending: Cata Manning MD  Discharge Attending: Dr. Delaney    [facility] Course:     Kamilah Ortega is a 23 y.o.  at 38w4d wks who was initially admitted for labor after spontaneous rupture of membranes at home. She was 2/70/-3 on arrival. She received an epidural for pain control. She progressed to complete dilation and began pushing.      She delivered a viable female  on 2024 at 1648. Weight 7lbs 3.2 oz via vaginal birth after  (). Apgars were 8 (1 min) and 9 (5 min).  was transferred to  nursery. Patient tolerated the procedure well and was transferred to recovery in stable condition.     Her postpartum course was complicated by on reading of elevated blood pressure. Her postpartum pain was well controlled with oral analgesics.    On day of discharge, she was ambulating and able to reasonably perform all ADLs. She was voiding and had appropriate bowel function. Pain was well controlled. She was discharged home on post-partum day #02 without complications. Patient was instructed to follow up with her OB as an outpatient and was given appropriate warnings to call provider if she develops signs of infection or uncontrolled pain.    Complications: none apparent    Condition at discharge: good      Discharge instructions/Information to patient and family:   - Do not place anything (no partner, tampons or douche) in your vagina for 6 weeks.  -You may walk for exercise for the first 6 weeks then gradually return to your usual activities.   -Please do not drive for 1 week if you have no stitches and for 2 weeks if you have stitches or underwent a  delivery.    -You may take baths or shower per your preference.   -Please look at your bust (breasts) in the mirror daily and call for redness or tenderness or increased warmth.   -Please call us for temperature > 100.4*F or 38* C, worsening pain or a foul discharge.      Discharge Medications:   Prenatal vitamin daily for 6 months or the duration of nursing whichever is longer.  Motrin 600 mg orally every 6 hours as needed for pain  Tylenol (over the counter) per bottle directions as needed for pain: do NOT use with percocet  Hydrocortisone cream 1% (over the counter) applied 1-2x daily to hemorrhoids as needed    Planned Readmission: No      Aleksander Groves  OB GYN PGY1  24  6:48 AM

## 2024-08-21 NOTE — ASSESSMENT & PLAN NOTE
Routine postpartum care  Encourage ambulation  Encourage familial bonding  Lactation support as needed  Pain: Motrin/Tylenol around the clock  Contraception: s/p Mirena (inserted 08/21/2024)

## 2024-08-21 NOTE — ANESTHESIA PROCEDURE NOTES
Epidural Block    Patient location during procedure: OB/L&D  Start time: 8/21/2024 7:55 AM  Reason for block: primary anesthetic  Staffing  Performed by: Jakob Wilson DO  Authorized by: Jakob Wilson DO    Preanesthetic Checklist  Completed: patient identified, IV checked, site marked, risks and benefits discussed, surgical consent, monitors and equipment checked, pre-op evaluation and timeout performed  Epidural  Patient position: sitting  Prep: Betadine  Sedation Level: no sedation  Patient monitoring: heart rate and frequent blood pressure checks  Approach: midline  Location: lumbar, L3-4  Injection technique: ROSE air  Needle  Needle type: Tuohy   Needle gauge: 18 G  Needle insertion depth: 4 cm  Catheter type: closed tip, multi-orifice and side hole  Catheter size: 20 G  Catheter at skin depth: 8 cm  Catheter securement method: tape, stabilization device and clear occlusive dressing  Test dose: negative  Assessment  Number of attempts: 1negative aspiration for CSF, negative aspiration for heme and no paresthesia on injection  patient tolerated the procedure well with no immediate complications

## 2024-08-21 NOTE — H&P
H & P- Obstetrics   Kamilah Ortega 23 y.o. female MRN: [id number]  Unit/Bed#: -01 Encounter: [id number]    Assessment: 23 y.o.  at 38w4d admitted for spontaneous rupture of membranes.  SVE: 2/70/-3  FHT: cat 1  Clinical EFW: 40%; Cephalic confirmed by TAUS  GBS status: negative     Plan:   Maternal anemia  Assessment & Plan  F/u admission HgB    Asthma during pregnancy  Assessment & Plan  Albuterol ordered    History of c/s x1  Assessment & Plan  Breech presentation in last pregnancy   Desires TOLAC    38 weeks gestation of pregnancy  Assessment & Plan  PNL wnl   O+  GBS neg  EFW 2863 grams - 6 lbs 5 oz (40%) at 36w5d    Not immune to HBV  Assessment & Plan  Offer vaccine postpartum    * Leakage of amniotic fluid  Assessment & Plan  Reports large amounts of gushing fluid mixed with mucus  SSE with minimal watery pooling and large amounts of mucus  Negative nitrazine but positive ferning  Kimberly regularly and painfully, SVE 2/70/-3  Admit for SROM/TOLAC and plan for 2-hour recheck s/p pain management  Admission labs - CBC, T&S, syphilis screen  Clear liquid diet  Anesthesia consult placed   Rh+, Rhogam not indicated   GBS-, prophylaxis not indicated  Continue with expectant management, augment with pitocin PRN        Discussed case and plan w/ Dr. Lang.    Chief Complaint: contractions and leaking fluid    HPI: Kamilah Ortega is a 23 y.o.  with an NOAH of 2024, by Last Menstrual Period at 38w4d who is being admitted for spontaneous rupture of membranes. She complains of uterine contractions, occurring every 5 minutes, has large amounts of fluid leaking, and reports no VB. She states she has felt good FM.    Patient Active Problem List   Diagnosis    Not immune to HBV    38 weeks gestation of pregnancy    History of c/s x1    Family hx DM    Asthma during pregnancy    Hx PTD @36w    IUGR    Maternal anemia    Leakage of amniotic fluid       Baby complications/comments:  none    Review of Systems   Constitutional:  Negative for chills and fever.   Respiratory:  Negative for cough and shortness of breath.    Gastrointestinal:  Negative for diarrhea, nausea and vomiting.   Genitourinary:  Negative for dysuria and hematuria.   Skin:  Negative for color change and rash.   Neurological:  Negative for dizziness, syncope and headaches.       OB Hx:  OB History    Para Term  AB Living   2 1 0 1 0 1   SAB IAB Ectopic Multiple Live Births   0 0 0 0 1      # Outcome Date GA Lbr Aroldo/2nd Weight Sex Type Anes PTL Lv   2 Current            1  21 36w6d  3140 g (6 lb 14.8 oz) F CS-LTranv Spinal Y MARISA       Past Medical Hx:  Past Medical History:   Diagnosis Date    Asthma     Depression        Past Surgical hx:  Past Surgical History:   Procedure Laterality Date    MD  DELIVERY ONLY N/A 2021    Procedure:  SECTION ();  Surgeon: Sari SAPP MD;  Location: [facility];  Service: Obstetrics       Social Hx:  Alcohol use: denies  Tobacco use: denies  Other substance use: denies    Allergies   Allergen Reactions    Fish-Derived Products - Food Allergy     Isoflavones (Soy)     Peanut Oil - Food Allergy        Medications Prior to Admission:     albuterol (ProAir HFA) 90 mcg/act inhaler    famotidine (PEPCID) 20 mg tablet    ferrous sulfate 324 (65 Fe) mg    Prenatal Vit-Fe Fumarate-FA (Prenatal Plus Vitamin/Mineral) 27-1 MG TABS    Objective:  Temp:  [96.2 °F (35.7 °C)-98.2 °F (36.8 °C)] 96.2 °F (35.7 °C)  HR:  [] 95  Resp:  [16-18] 18  BP: (116-124)/(63-71) 124/71  Body mass index is 31.74 kg/m².     Physical Exam:  Physical Exam  Constitutional:       Appearance: Normal appearance.   Genitourinary:      Vulva normal.   Cardiovascular:      Rate and Rhythm: Normal rate.   Pulmonary:      Effort: Pulmonary effort is normal.   Abdominal:      Palpations: Abdomen is soft.   Neurological:      General: No focal deficit present.      Mental  Status: She is alert and oriented to person, place, and time.   Skin:     General: Skin is dry.   Psychiatric:         Mood and Affect: Mood normal.          FHT:  Baseline Rate (FHR): 135 bpm  Variability: Moderate  Accelerations: 15 x 15 or greater, At variable times    TOCO:   Contraction Frequency (minutes): 1.5-7 (irritability)  Contraction Duration (seconds):   Contraction Intensity: Moderate    Lab Results   Component Value Date    WBC 9.89 08/21/2024    HGB 11.4 (L) 08/21/2024    HCT 35.3 08/21/2024     (L) 08/21/2024     Lab Results   Component Value Date    K 3.7 01/02/2024     01/02/2024    CO2 24 01/02/2024    BUN 11 01/02/2024    CREATININE 0.69 01/02/2024    AST 13 01/02/2024    ALT 11 01/02/2024     Prenatal Labs: Reviewed      Blood type: O+  Antibody: negative  GBS: negative  HIV: non-reactive  Rubella: immune  Syphilis IgM/IgG: non-reactive  HBsAg: non-reactive  HCAb: non-reactive  Chlamydia: negative  Gonorrhea: negative  Diabetes 1 hour screen: passed  3 hour glucose: n/a  Platelets: 141, pending admission CBC  Hgb: 11.4, pending admission CBC  >2 Midnights  INPATIENT     Signature/Title: Rach Sahu MD  Date: 8/21/2024  Time: 6:20 AM

## 2024-08-22 ENCOUNTER — TELEPHONE (OUTPATIENT)
Dept: OBGYN CLINIC | Facility: CLINIC | Age: 24
End: 2024-08-22

## 2024-08-22 PROCEDURE — 4A1HXCZ MONITORING OF PRODUCTS OF CONCEPTION, CARDIAC RATE, EXTERNAL APPROACH: ICD-10-PCS | Performed by: OBSTETRICS & GYNECOLOGY

## 2024-08-22 PROCEDURE — 99024 POSTOP FOLLOW-UP VISIT: CPT | Performed by: OBSTETRICS & GYNECOLOGY

## 2024-08-22 PROCEDURE — 0HQ9XZZ REPAIR PERINEUM SKIN, EXTERNAL APPROACH: ICD-10-PCS | Performed by: OBSTETRICS & GYNECOLOGY

## 2024-08-22 RX ADMIN — ACETAMINOPHEN 650 MG: 325 TABLET ORAL at 22:56

## 2024-08-22 RX ADMIN — ACETAMINOPHEN 650 MG: 325 TABLET ORAL at 08:24

## 2024-08-22 RX ADMIN — IBUPROFEN 600 MG: 600 TABLET, FILM COATED ORAL at 16:43

## 2024-08-22 RX ADMIN — IBUPROFEN 600 MG: 600 TABLET, FILM COATED ORAL at 05:30

## 2024-08-22 RX ADMIN — CALCIUM CARBONATE (ANTACID) CHEW TAB 500 MG 1000 MG: 500 CHEW TAB at 08:03

## 2024-08-22 RX ADMIN — IBUPROFEN 600 MG: 600 TABLET, FILM COATED ORAL at 11:23

## 2024-08-22 RX ADMIN — ACETAMINOPHEN 650 MG: 325 TABLET ORAL at 02:51

## 2024-08-22 NOTE — LACTATION NOTE
This note was copied from a baby's chart.  Maggi is resting and declines to meet with lactation at this time, she states breastfeeding is going well. Lactation phone number provided and encouraged her to call for a consult when she is ready.

## 2024-08-22 NOTE — LACTATION NOTE
This note was copied from a baby's chart.  CONSULT - LACTATION  Baby Girl (Delphine Ortega 1 days female MRN: [id number]    [facility] NURSERY Room / Bed:  417(N)/ 417(N) Encounter: [id number]    Maternal Information     MOTHER:  Kamilah Ortega  Maternal Age: 23 y.o.  OB History: # 1 - Date: 21, Sex: Female, Weight: 3140 g (6 lb 14.8 oz), GA: 36w6d, Type: , Low Transverse, Apgar1: 8, Apgar5: 9, Living: Living, Birth Comments: None    # 2 - Date: 24, Sex: Female, Weight: 3265 g (7 lb 3.2 oz), GA: 38w4d, Type: Vaginal, Spontaneous, Apgar1: 8, Apgar5: 9, Living: Living, Birth Comments: None   Previouse breast reduction surgery? No    Lactation history:   Has patient previously breast fed: Yes   How long had patient previously breast fed: 1 year   Previous breast feeding complications: None     Past Surgical History:   Procedure Laterality Date    ME  DELIVERY ONLY N/A 2021    Procedure:  SECTION ();  Surgeon: Sari SAPP MD;  Location: [facility];  Service: Obstetrics       Birth information:  YOB: 2022   Time of birth: 4:48 PM   Sex: female   Delivery type: Vaginal, Spontaneous   Birth Weight: 3265 g (7 lb 3.2 oz)   Percent of Weight Change: -2%     Gestational Age: 38w4d   [unfilled]    Assessment     Breast and nipple assessment:  no clinical exam at this time      Assessment: sleepy    Feeding assessment: feeding well per mom, feeding not observed at this time   LATCH:  Latch: Grasps breast, tongue down, lips flanged, rhythmic sucking   Audible Swallowing: Spontaneous and intermittent (24 hours old)   Type of Nipple: Everted (After stimulation)   Comfort (Breast/Nipple): Soft/non-tender   Hold (Positioning): Partial assist, teach one side, mother does other, staff holds   LATCH Score: 9          Feeding recommendations:  breast feed on demand    Met with Dyad. Provided  with Ready, Set, Baby  booklet which contained information on:  Hand expression with access to QR codes to review hand expression.  Positioning and latch reviewed as well as showing images of other feeding positions.  Discussed the properties of a good latch in any position.   Feeding on cue and what that means for recognizing infant's hunger, s/s that baby is getting enough milk and some s/s that breastfeeding dyad may need further help  Skin to Skin contact and benefits to mom and baby  Avoidance of pacifiers for the first month discussed.   Gave information on common concerns, what to expect the first few weeks after delivery, preparing for other caregivers, and how partners can help. Resources for support also provided.    Mom states that breastfeeding is going well, she reports regular feedings with comfortable latch and good suck/swallow.  She denies any questions or concerns at this time.      Encouraged family to call for assistance as needed and for a latch check at the next feeding.  Samaria Ludwig RN 8/22/2024 2:33 PM

## 2024-08-22 NOTE — PLAN OF CARE
Problem: POSTPARTUM  Goal: Experiences normal postpartum course  Description: INTERVENTIONS:  - Monitor maternal vital signs  - Assess uterine involution and lochia  Outcome: Progressing  Goal: Appropriate maternal -  bonding  Description: INTERVENTIONS:  - Identify family support  - Assess for appropriate maternal/infant bonding   -Encourage maternal/infant bonding opportunities  - Referral to  or  as needed  Outcome: Progressing  Goal: Establishment of infant feeding pattern  Description: INTERVENTIONS:  - Assess breast/bottle feeding  - Refer to lactation as needed  Outcome: Progressing

## 2024-08-22 NOTE — ANESTHESIA POSTPROCEDURE EVALUATION
"Post-Op Assessment Note    CV Status:  Stable    Pain management: adequate      Post-op block assessment: catheter intact and no complications   Mental Status:  Alert and awake   Hydration Status:  Stable   PONV Controlled:  None   Airway Patency:  Patent    No anethesia notable event occurred.    Staff: Anesthesiologist         /72   Pulse 80   Temp (!) 97.2 °F (36.2 °C) (Temporal)   Resp 18   Ht 5' 1\" (1.549 m)   Wt 76.2 kg (168 lb)   LMP 11/25/2023 Comment: US estimates 5 weeks 1 day gestation  SpO2 100%   Breastfeeding Yes   BMI 31.74 kg/m²         BP      Temp      Pulse     Resp      SpO2        "

## 2024-08-22 NOTE — TELEPHONE ENCOUNTER
"Patient called and left voicemail:   \"Hi my name is Kamilah Ortega. My birthday is December 23rd 2000. I'm calling because I have an appointment with an OB today at 1:30 that I need to reschedule due to the fact that I had my baby and I need to schedule her appointments. So again, this is Kamilah Ortega birthday, December 23rd, 2000. You can reach me back at [phone]. Have a nice day, bye bye.\"    Called patient back and patient was scheduled for 9/18/24 with Julianne Mack.     "

## 2024-08-22 NOTE — PROGRESS NOTES
"Progress Note - OB/GYN   Kamilah Ortega 23 y.o. female MRN: [id number]  Unit/Bed#: -01 Encounter: [id number]    Assessment:  Post partum Day #01 s/p , stable, baby in the room    Plan:  Asthma during pregnancy  Assessment & Plan  Albuterol ordered    Not immune to HBV  Assessment & Plan  Ordered vaccine postpartum    *  (vaginal birth after )  Assessment & Plan  Routine postpartum care  Encourage ambulation  Encourage familial bonding  Lactation support as needed  Pain: Motrin/Tylenol around the clock  Contraception: PP Mirena        Subjective/Objective   Chief Complaint:     Post delivery. Patient is doing well. Lochia WNL. Pain well controlled.     Subjective:     Pain: yes, cramping, improved with meds  Tolerating PO: yes  Voiding: yes  Flatus: yes  Ambulating: yes  Chest pain: no  Shortness of breath: no  Leg pain: no  Lochia: minimal    Objective:     Vitals: /63 (BP Location: Right arm)   Pulse 95   Temp 97.5 °F (36.4 °C) (Temporal)   Resp 18   Ht 5' 1\" (1.549 m)   Wt 76.2 kg (168 lb)   LMP 2023 Comment: US estimates 5 weeks 1 day gestation  SpO2 100%   Breastfeeding Yes   BMI 31.74 kg/m²     I/O          0701   0700  07 0700    Urine (mL/kg/hr)  1450 (0.8)    Blood  234    Total Output  1684    Net  -1684          Unmeasured Urine Occurrence 1 x             Lab Results   Component Value Date    WBC 9.89 2024    HGB 11.4 (L) 2024    HCT 35.3 2024    MCV 89 2024     (L) 2024       Physical Exam:     Gen: AAOx3, NAD  CV: no acute distress  Lungs: no acute distress  Abd: Soft, non-tender, non-distended, no rebound or guarding  Uterine fundus firm and non-tender, at the level of the umbilicus.  Ext: Non tender    Inacarlos Hill MD  OBGYN PGY-1  2024  6:18 AM         "

## 2024-08-22 NOTE — PLAN OF CARE
Problem: PAIN - ADULT  Goal: Verbalizes/displays adequate comfort level or baseline comfort level  Description: Interventions:  - Encourage patient to monitor pain and request assistance  - Assess pain using appropriate pain scale  - Administer analgesics based on type and severity of pain and evaluate response  - Implement non-pharmacological measures as appropriate and evaluate response  - Consider cultural and social influences on pain and pain management  - Notify physician/advanced practitioner if interventions unsuccessful or patient reports new pain  Outcome: Progressing     Problem: INFECTION - ADULT  Goal: Absence or prevention of progression during hospitalization  Description: INTERVENTIONS:  - Assess and monitor for signs and symptoms of infection  - Monitor lab/diagnostic results  - Monitor all insertion sites, i.e. indwelling lines, tubes, and drains  - Monitor endotracheal if appropriate and nasal secretions for changes in amount and color  - [place] appropriate cooling/warming therapies per order  - Administer medications as ordered  - Instruct and encourage patient and family to use good hand hygiene technique  - Identify and instruct in appropriate isolation precautions for identified infection/condition  Outcome: Progressing     Problem: SAFETY ADULT  Goal: Patient will remain free of falls  Description: INTERVENTIONS:  - Educate patient/family on patient safety including physical limitations  - Instruct patient to call for assistance with activity   - Consult OT/PT to assist with strengthening/mobility   - Keep Call bell within reach  - Keep bed low and locked with side rails adjusted as appropriate  - Keep care items and personal belongings within reach  - Initiate and maintain comfort rounds  - Make Fall Risk Sign visible to staff  - Apply yellow socks and bracelet for high fall risk patients  - Consider moving patient to room near nurses station  Outcome: Progressing  Goal: Maintain or  return to baseline ADL function  Description: INTERVENTIONS:  -  Assess patient's ability to carry out ADLs; assess patient's baseline for ADL function and identify physical deficits which impact ability to perform ADLs (bathing, care of mouth/teeth, toileting, grooming, dressing, etc.)  - Assess/evaluate cause of self-care deficits   - Assess range of motion  - Assess patient's mobility; develop plan if impaired  - Assess patient's need for assistive devices and provide as appropriate  - Encourage maximum independence but intervene and supervise when necessary  - Involve family in performance of ADLs  - Assess for home care needs following discharge   - Consider OT consult to assist with ADL evaluation and planning for discharge  - Provide patient education as appropriate  Outcome: Progressing  Goal: Maintains/Returns to pre admission functional level  Description: INTERVENTIONS:  - Perform AM-PAC 6 Click Basic Mobility/ Daily Activity assessment daily.  - Set and communicate daily mobility goal to care team and patient/family/caregiver.   - Collaborate with rehabilitation services on mobility goals if consulted  - Out of bed for toileting  - Record patient progress and toleration of activity level   Outcome: Progressing     Problem: Knowledge Deficit  Goal: Patient/family/caregiver demonstrates understanding of disease process, treatment plan, medications, and discharge instructions  Description: Complete learning assessment and assess knowledge base.  Interventions:  - Provide teaching at level of understanding  - Provide teaching via preferred learning methods  Outcome: Progressing     Problem: DISCHARGE PLANNING  Goal: Discharge to home or other facility with appropriate resources  Description: INTERVENTIONS:  - Identify barriers to discharge w/patient and caregiver  - Arrange for needed discharge resources and transportation as appropriate  - Identify discharge learning needs (meds, wound care, etc.)  -  Arrange for interpretive services to assist at discharge as needed  - Refer to Case Management Department for coordinating discharge planning if the patient needs post-hospital services based on physician/advanced practitioner order or complex needs related to functional status, cognitive ability, or social support system  Outcome: Progressing     Problem: POSTPARTUM  Goal: Experiences normal postpartum course  Description: INTERVENTIONS:  - Monitor maternal vital signs  - Assess uterine involution and lochia  Outcome: Progressing  Goal: Appropriate maternal -  bonding  Description: INTERVENTIONS:  - Identify family support  - Assess for appropriate maternal/infant bonding   -Encourage maternal/infant bonding opportunities  - Referral to  or  as needed  Outcome: Progressing  Goal: Establishment of infant feeding pattern  Description: INTERVENTIONS:  - Assess breast/bottle feeding  - Refer to lactation as needed  Outcome: Progressing  Goal: Incision(s), wounds(s) or drain site(s) healing without S/S of infection  Description: INTERVENTIONS  - Assess and document dressing, incision, wound bed, drain sites and surrounding tissue  - Provide patient and family education  - Perform skin care/dressing changes   Outcome: Progressing

## 2024-08-23 VITALS
RESPIRATION RATE: 16 BRPM | TEMPERATURE: 97.6 F | DIASTOLIC BLOOD PRESSURE: 83 MMHG | HEIGHT: 61 IN | BODY MASS INDEX: 31.72 KG/M2 | SYSTOLIC BLOOD PRESSURE: 117 MMHG | OXYGEN SATURATION: 100 % | HEART RATE: 78 BPM | WEIGHT: 168 LBS

## 2024-08-23 PROBLEM — R03.0 ELEVATED BLOOD PRESSURE READING WITHOUT DIAGNOSIS OF HYPERTENSION: Status: ACTIVE | Noted: 2024-08-23

## 2024-08-23 LAB
DME PARACHUTE DELIVERY DATE ACTUAL: NORMAL
DME PARACHUTE DELIVERY DATE REQUESTED: NORMAL
DME PARACHUTE DELIVERY NOTE: NORMAL
DME PARACHUTE ITEM DESCRIPTION: NORMAL
DME PARACHUTE ORDER STATUS: NORMAL
DME PARACHUTE SUPPLIER NAME: NORMAL
DME PARACHUTE SUPPLIER PHONE: NORMAL

## 2024-08-23 PROCEDURE — 90746 HEPB VACCINE 3 DOSE ADULT IM: CPT

## 2024-08-23 PROCEDURE — 99024 POSTOP FOLLOW-UP VISIT: CPT | Performed by: OBSTETRICS & GYNECOLOGY

## 2024-08-23 RX ORDER — ACETAMINOPHEN 500 MG
500 TABLET ORAL EVERY 6 HOURS PRN
Qty: 56 TABLET | Refills: 0 | Status: SHIPPED | OUTPATIENT
Start: 2024-08-23 | End: 2024-09-06

## 2024-08-23 RX ORDER — IBUPROFEN 600 MG/1
600 TABLET, FILM COATED ORAL EVERY 8 HOURS PRN
Qty: 42 TABLET | Refills: 0 | Status: SHIPPED | OUTPATIENT
Start: 2024-08-23 | End: 2024-09-06

## 2024-08-23 RX ORDER — BENZOCAINE/MENTHOL 6 MG-10 MG
1 LOZENGE MUCOUS MEMBRANE DAILY PRN
Start: 2024-08-23

## 2024-08-23 RX ORDER — CALCIUM CARBONATE 500 MG/1
1000 TABLET, CHEWABLE ORAL DAILY PRN
Start: 2024-08-23

## 2024-08-23 RX ADMIN — HEPATITIS B VACCINE (RECOMBINANT) 20 MCG: 20 INJECTION, SUSPENSION INTRAMUSCULAR at 10:56

## 2024-08-23 RX ADMIN — IBUPROFEN 600 MG: 600 TABLET, FILM COATED ORAL at 06:10

## 2024-08-23 RX ADMIN — IBUPROFEN 600 MG: 600 TABLET, FILM COATED ORAL at 00:19

## 2024-08-23 RX ADMIN — IBUPROFEN 600 MG: 600 TABLET, FILM COATED ORAL at 12:05

## 2024-08-23 RX ADMIN — CALCIUM CARBONATE (ANTACID) CHEW TAB 500 MG 1000 MG: 500 CHEW TAB at 10:53

## 2024-08-23 NOTE — UTILIZATION REVIEW
"Mother and baby discharged on 2024       NOTIFICATION OF INPATIENT ADMISSION   MATERNITY/DELIVERY AUTHORIZATION REQUEST   SERVICING FACILITY:   [facility] - L&D, , NICU  [address] [place] [address]  Tax ID: [id number]  NPI: [id number] ATTENDING PROVIDER:  Attending Name and NPI#: Cata Manning Md [[id number]]  Address: [address] [place] [address]  Phone: [phone]     ADMISSION INFORMATION:  Place of Service: Inpatient [facility]  Place of Service Code: 21  Inpatient Admission Date/Time: 24  5:34 AM  Discharge Date/Time: 2024  1:15 PM  Admitting Diagnosis Code/Description:  Vaginal discharge during pregnancy [O26.899, N89.8]   Mother: Kamilah Ortega 2000 Estimated Date of Delivery: 24  Delivering clinician: Cata Manning   OB History          2    Para   2    Term   1       1    AB   0    Living   2         SAB   0    IAB   0    Ectopic   0    Multiple   0    Live Births   2               Dardanelle Name & MRN:   Information for the patient's :  Jordan, Baby Girl (Kamilah) [[id number]]   [place]elle Delivery Information:  Sex: female  Delivered 2024 4:48 PM by Vaginal, Spontaneous; Gestational Age: 38w4d     Measurements:  Weight: 7 lb 3.2 oz (3265 g);  Height: 20.5\"    APGAR 1 minute 5 minutes 10 minutes   Totals: 8 9       UTILIZATION REVIEW CONTACT:  Aline Velázquez, Utilization   Network Utilization Review Department  Phone: [phone]  Fax [phone]  Email: [email or web address]  Contact for approvals/pending authorizations, clinical reviews, and discharge.   PHYSICIAN ADVISORY SERVICES:  Medical Necessity Denial & [employer]ht[employer]-it-[employer] Review  Phone: [phone]  Fax: [phone]  Email: [email or web address]   DISCHARGE SUPPORT TEAM:  For Patients Discharge Needs & Updates  Phone: [phone] opt. 2 Fax: " [phone]  Email: [email or web address]

## 2024-08-23 NOTE — CASE MANAGEMENT
Case Management Progress Note    Patient name Kamilah Ortega  Location /-01 MRN [id number]  : 2000 Date 2024       LOS (days): 2  Geometric Mean LOS (GMLOS) (days):   Days to GMLOS:        OBJECTIVE:        Current admission status: Inpatient  Preferred Pharmacy:    [facility] [place] - [address] [address]  Phone: [phone] Fax: [phone]    [facility] [address] [place] [facility] [place] - [address] [place] [address]  Phone: [phone] Fax: [phone]    [facility] [place][id number] [place] - [address] [facility]  [address] [place], [place] [address]  Phone: [phone] Fax: [phone]    Primary Care Provider: No primary care provider on file.    Primary Insurance: [employer]  Secondary Insurance:     PROGRESS NOTE:    Pt requesting a Zomee Z2 Breast Pump.  CM placed order in [place].  Order approved. Pt does not have an OOP cost.  CM delivered Zomee Z2 Breast Pump to [facility].  [facility] signed delivery ticket.  No other needs at this time.

## 2024-08-23 NOTE — PLAN OF CARE
Problem: PAIN - ADULT  Goal: Verbalizes/displays adequate comfort level or baseline comfort level  Description: Interventions:  - Encourage patient to monitor pain and request assistance  - Assess pain using appropriate pain scale  - Administer analgesics based on type and severity of pain and evaluate response  - Implement non-pharmacological measures as appropriate and evaluate response  - Consider cultural and social influences on pain and pain management  - Notify physician/advanced practitioner if interventions unsuccessful or patient reports new pain  Outcome: Progressing     Problem: INFECTION - ADULT  Goal: Absence or prevention of progression during hospitalization  Description: INTERVENTIONS:  - Assess and monitor for signs and symptoms of infection  - Monitor lab/diagnostic results  - Monitor all insertion sites, i.e. indwelling lines, tubes, and drains  - Monitor endotracheal if appropriate and nasal secretions for changes in amount and color  - [place] appropriate cooling/warming therapies per order  - Administer medications as ordered  - Instruct and encourage patient and family to use good hand hygiene technique  - Identify and instruct in appropriate isolation precautions for identified infection/condition  Outcome: Progressing     Problem: SAFETY ADULT  Goal: Patient will remain free of falls  Description: INTERVENTIONS:  - Educate patient/family on patient safety including physical limitations  - Instruct patient to call for assistance with activity   - Consult OT/PT to assist with strengthening/mobility   - Keep Call bell within reach  - Keep bed low and locked with side rails adjusted as appropriate  - Keep care items and personal belongings within reach  - Initiate and maintain comfort rounds  - Make Fall Risk Sign visible to staff  - Offer Toileting every  Hours, in advance of need  - Initiate/Maintain alarm  - Obtain necessary fall risk management equipment:   - Apply yellow socks and  bracelet for high fall risk patients  - Consider moving patient to room near nurses station  Outcome: Progressing  Goal: Maintain or return to baseline ADL function  Description: INTERVENTIONS:  -  Assess patient's ability to carry out ADLs; assess patient's baseline for ADL function and identify physical deficits which impact ability to perform ADLs (bathing, care of mouth/teeth, toileting, grooming, dressing, etc.)  - Assess/evaluate cause of self-care deficits   - Assess range of motion  - Assess patient's mobility; develop plan if impaired  - Assess patient's need for assistive devices and provide as appropriate  - Encourage maximum independence but intervene and supervise when necessary  - Involve family in performance of ADLs  - Assess for home care needs following discharge   - Consider OT consult to assist with ADL evaluation and planning for discharge  - Provide patient education as appropriate  Outcome: Progressing  Goal: Maintains/Returns to pre admission functional level  Description: INTERVENTIONS:  - Perform AM-PAC 6 Click Basic Mobility/ Daily Activity assessment daily.  - Set and communicate daily mobility goal to care team and patient/family/caregiver.   - Collaborate with rehabilitation services on mobility goals if consulted  - Perform Range of Motion  times a day.  - Reposition patient every  hours.  - Dangle patient  times a day  - Stand patient  times a day  - Ambulate patient  times a day  - Out of bed to chair  times a day   - Out of bed for meals  times a day  - Out of bed for toileting  - Record patient progress and toleration of activity level   Outcome: Progressing     Problem: Knowledge Deficit  Goal: Patient/family/caregiver demonstrates understanding of disease process, treatment plan, medications, and discharge instructions  Description: Complete learning assessment and assess knowledge base.  Interventions:  - Provide teaching at level of understanding  - Provide teaching via  preferred learning methods  Outcome: Progressing     Problem: DISCHARGE PLANNING  Goal: Discharge to home or other facility with appropriate resources  Description: INTERVENTIONS:  - Identify barriers to discharge w/patient and caregiver  - Arrange for needed discharge resources and transportation as appropriate  - Identify discharge learning needs (meds, wound care, etc.)  - Arrange for interpretive services to assist at discharge as needed  - Refer to Case Management Department for coordinating discharge planning if the patient needs post-hospital services based on physician/advanced practitioner order or complex needs related to functional status, cognitive ability, or social support system  Outcome: Progressing     Problem: POSTPARTUM  Goal: Experiences normal postpartum course  Description: INTERVENTIONS:  - Monitor maternal vital signs  - Assess uterine involution and lochia  Outcome: Progressing  Goal: Appropriate maternal -  bonding  Description: INTERVENTIONS:  - Identify family support  - Assess for appropriate maternal/infant bonding   -Encourage maternal/infant bonding opportunities  - Referral to  or  as needed  Outcome: Progressing  Goal: Establishment of infant feeding pattern  Description: INTERVENTIONS:  - Assess breast/bottle feeding  - Refer to lactation as needed  Outcome: Progressing  Goal: Incision(s), wounds(s) or drain site(s) healing without S/S of infection  Description: INTERVENTIONS  - Assess and document dressing, incision, wound bed, drain sites and surrounding tissue  - Provide patient and family education  - Perform skin care/dressing changes every   Outcome: Progressing

## 2024-08-23 NOTE — NURSING NOTE
Provided discharge teaching to patient.  Gave and explained the save your life magnet as well as maternal and  packet.  Patient received information well and had no further questions at this time.

## 2024-08-23 NOTE — LACTATION NOTE
This note was copied from a baby's chart.  CONSULT - LACTATION  Baby Girl (Delphine Ortega 2 days female MRN: [id number]    [facility] NURSERY Room / Bed:  417(N)/ 417(N) Encounter: [id number]    Maternal Information     MOTHER:  Kamilah Ortega  Maternal Age: 23 y.o.  OB History: # 1 - Date: 21, Sex: Female, Weight: 3140 g (6 lb 14.8 oz), GA: 36w6d, Type: , Low Transverse, Apgar1: 8, Apgar5: 9, Living: Living, Birth Comments: None    # 2 - Date: 24, Sex: Female, Weight: 3265 g (7 lb 3.2 oz), GA: 38w4d, Type: Vaginal, Spontaneous, Apgar1: 8, Apgar5: 9, Living: Living, Birth Comments: None   Previouse breast reduction surgery? No    Lactation history:   Has patient previously breast fed: Yes   How long had patient previously breast fed: 1 year   Previous breast feeding complications: None     Past Surgical History:   Procedure Laterality Date    [place]  DELIVERY ONLY N/A 2021    Procedure:  SECTION ();  Surgeon: Sari SAPP MD;  Location: [facility];  Service: Obstetrics       Birth information:  YOB: 2022   Time of birth: 4:48 PM   Sex: female   Delivery type: Vaginal, Spontaneous   Birth Weight: 3265 g (7 lb 3.2 oz)   Percent of Weight Change: -3%     Gestational Age: 38w4d   [unfilled]    Assessment     Breast and nipple assessment: sore nipple     Assessment:  Not assessed at this time.    Feeding assessment: feeding well  LATCH:  Latch: Grasps breast, tongue down, lips flanged, rhythmic sucking   Audible Swallowing: Spontaneous and intermittent (24 hours old)   Type of Nipple: Everted (After stimulation)   Comfort (Breast/Nipple): Filling, red/small blisters/bruises, mild/moderate discomfort   Hold (Positioning): No assist from staff, mother able to position/hold infant   LATCH Score: 9          Feeding recommendations:  breast feed on demand       24 1015   Lactation Consultation   Reason  for Consult 10 min;10 minutes   Lactation Consultant Total Time 20   Maternal Information   Has mother  before? Yes   How long did the the mother previously breastfeed? 1 year   Previous Maternal Breastfeeding Challenges None   Infant to breast within first hour of birth? Yes   Exclusive Pump and Bottle Feed No   LATCH Documentation   Latch 2   Audible Swallowing 2   Type of Nipple 2   Comfort (Breast/Nipple) 1   Hold (Positioning) 2   LATCH Score 9   Having latch problems? No   Position(s) Used Cradle   Breasts/Nipples   Left Breast Soft   Right Breast Soft   Left Nipple Everted   Right Nipple Everted   Intervention Lanolin   Breastfeeding Status Yes   Breastfeeding Progress Breastfeeding well   Other OB Lactation Tools   Feeding Devices Lanolin   Patient Follow-Up   Lactation Consult Status 2   Follow-Up Type Inpatient;Call as needed   Other OB Lactation Documentation    Additional Problem Noted Breastfeeding going well per mom. Patient reports cramping during breastfeeding. Slight pinching at the breast. Reviewed bringing baby closer to the breast, breaking suction and relatching baby when she feels pinching. Reviewed cluster feeding, engorgemment and d/c booklet.     Met with Kamilah, she reports breastfeeding is going well. She is set for discharge today.   Patient reports baby is breastfeeding well.  Patient is encouraged to breastfeed on demand at least 8-12 times a day and watch for early hunger cues- fluttering eyes/waking from sleep, rooting (open mouth and turns head), bringing hands to mouth, sucking on tongue or hands, tight fists held at center of chest and crying.    She reports she is feeling cramping while she breastfeeding, discussed the benefits of that. She reports baby sometimes pinches at the breast. Emphasized bringing baby closer to the breast, ensuring baby is proper alignment - ears, shoulders and hips. Support the base of the baby's neck, avoiding holding the back of the baby's  head to allow the baby to move as they need to. Aligning baby's nose to the nipple and allowing for the baby to open wide, with the baby's chin touching the breast first. Make sure the nipple is deep in the baby's mouth and that the baby is nice and close to the mom.    Reviewed that breastfeeding should not be painful, initial latch pain but should wear off and should not be continuous throughout the feeding. Sensation of strong pull and tug and not pinching. Appearance of nipple should be nice and round after the baby was latched and no compression stripe should be visible. If it feels like baby is pinching at the breast, break the suction by putting your pinky in the corner of the baby's mouth.     Discussed hand expressing breast milk onto nipples and letting it air dry to help with sore nipples. In addition to paying close attention to latch, apply protective ointment after feeding or pumping and cover with an occlusive dressing like parchment paper.     Discussed cluster feeding is a normal baby behavior and helps bring in a good milk supply. Reviewed Baby's belly size in the first day is very small. By day 3, day 7 and 1 month baby's belly continues to grow and baby starts to feed more. The frequent feeding helps bring in a good milk supply.    Reviewed how to know when the baby is getting enough- when the sucking starts to slow down. Goes from rhythmic sucking to more flutter sucking. Baby starts to face away from the breast and lets go of the nipple. Baby falls asleep and arms nice and relaxed. Ensuring offering both breasts multiples times during a feeding.    Explained engorgement comfort measures- Continuing to breastfeed on demand, not waiting too long between feeds, hand expressing, pumping to comfort for a couple of minutes and not empty, reverse pressure softening to help the baby latch onto the breast. Using warm compress to help with milk flow and ice between feeds for inflammation.     Placed a CM  consult for a Zomee Z2 breast pump for home.    Discussed [facility] upon discharge.    Kamilah is encouraged to call lactation for any additional help, questions, concerns or support.      Jacqueline Barillas 8/23/2024 11:04 AM

## 2024-08-23 NOTE — PROGRESS NOTES
"Progress Note - OB/GYN   Kamilah Ortega 23 y.o. female MRN: [id number]  Unit/Bed#: -01 Encounter: [id number]    Assessment:  Post partum Day #02 s/p  + PP Mirena, stable, baby in the room.    Plan:  Elevated blood pressure reading without diagnosis of hypertension  Assessment & Plan  PT presented one reading of elevated BP during /84 mmHg  BP monitoring    Asthma during pregnancy  Assessment & Plan  Albuterol ordered    Not immune to HBV  Assessment & Plan  Ordered vaccine postpartum    *  (vaginal birth after )  Assessment & Plan  Routine postpartum care  Encourage ambulation  Encourage familial bonding  Lactation support as needed  Pain: Motrin/Tylenol around the clock  Contraception: s/p Mirena (inserted 2024)        Subjective/Objective   Chief Complaint:     Post delivery. Patient is doing well. Lochia WNL. Pain well controlled.     Subjective:     Pain: yes, cramping, improved with meds  Tolerating PO: yes  Voiding: yes  Flatus: yes  Ambulating: yes  Chest pain: no  Shortness of breath: no  Leg pain: no  Lochia: minimal    Objective:     Vitals: /82 (BP Location: Right arm)   Pulse 65   Temp (!) 97 °F (36.1 °C) (Oral)   Resp 16   Ht 5' 1\" (1.549 m)   Wt 76.2 kg (168 lb)   LMP 2023 Comment: US estimates 5 weeks 1 day gestation  SpO2 100%   Breastfeeding Yes   BMI 31.74 kg/m²     I/O          0701   0700  0701   0700    Urine (mL/kg/hr) 1450 (0.8)     Blood 234     Total Output 1684     Net -1684                   Lab Results   Component Value Date    WBC 9.89 2024    HGB 11.4 (L) 2024    HCT 35.3 2024    MCV 89 2024     (L) 2024       Physical Exam:     Gen: AAOx3, NAD  CV: no acute distress  Lungs: no acute distress  Abd: Soft, non-tender, non-distended, no rebound or guarding  Uterine fundus firm and non-tender, 02 cm below the umbilicus.   Ext: Non tender    Inaldo Teddy Hill MD  OBGYN " PGY-1  8/23/2024  5:42 AM

## 2024-08-27 ENCOUNTER — NURSE TRIAGE (OUTPATIENT)
Dept: OTHER | Facility: OTHER | Age: 24
End: 2024-08-27

## 2024-08-27 NOTE — TELEPHONE ENCOUNTER
"Regardin days post partum /passed 2 blood clots bigger than a golf ball  ----- Message from Martha SPARROW sent at 2024  7:44 PM EDT -----  \"I am 6 days post partum and I just passed 2 blood clots bigger than a golf ball. I am not sure what to do \"    "

## 2024-08-27 NOTE — TELEPHONE ENCOUNTER
"Reason for Disposition   [1] Passing blood clots AND [2] persists > 4 days postpartum    Answer Assessment - Initial Assessment Questions  1. ONSET: \"Describe your bleeding: is it getting worse, staying the same, improving, or stopping and starting?\"    Staying the same    2. AMOUNT: \"How much bleeding are you having today?\"      Two clots larger than a golf ball. Same amount as when left the hospital. This started at 7:30pm. Passed another clot of normal size while on the phone with [facility].     3. ABDOMINAL PAIN: \"Do you have any pain?\" \"How bad is the pain?\" \"What does it keep you from doing?\"      Denies    4. HORMONES: \"Are you taking any birth control medications?\" (e.g., birth control pills, Depo-Provera)    IUD placed after birth    6. OTHER SYMPTOMS: \"Do you have any other symptoms?\" (e.g., fever, chills, dizziness)    Heart palpitations and anxiety    7. DELIVERY DATE: \"When was your delivery date?\" \"Vaginal delivery or ?\"            8. BREASTFEEDING: \"Are you breastfeeding?\"    Yes    124/73 BP taken from home monitor    Protocols used: Postpartum - Vaginal Bleeding and Lochia-ADULT-AH    Patient bleeding reported to be back to mild flow and smaller clots by end of call. Recommended patient monitor pad count and call back with any more concerns. Patient informed and verbalized understanding of disposition to see PCP within 24 hrs.   "

## 2024-08-29 LAB — PLACENTA IN STORAGE: NORMAL

## 2024-09-03 ENCOUNTER — TELEPHONE (OUTPATIENT)
Dept: OBGYN CLINIC | Facility: CLINIC | Age: 24
End: 2024-09-03

## 2024-09-06 ENCOUNTER — TELEMEDICINE (OUTPATIENT)
Dept: BEHAVIORAL/MENTAL HEALTH CLINIC | Facility: CLINIC | Age: 24
End: 2024-09-06
Payer: COMMERCIAL

## 2024-09-06 DIAGNOSIS — F93.8: Primary | ICD-10-CM

## 2024-09-06 DIAGNOSIS — F33.40 RECURRENT MAJOR DEPRESSIVE DISORDER, IN REMISSION (HCC): ICD-10-CM

## 2024-09-06 PROCEDURE — 90834 PSYTX W PT 45 MINUTES: CPT | Performed by: COUNSELOR

## 2024-09-06 NOTE — PSYCH
Virtual Regular Visit    Verification of patient location:    Patient is located at Home in the following state in which I hold an active license [place]      Assessment/Plan:    Problem List Items Addressed This Visit    None  Visit Diagnoses       Relationship problems specific to childhood and adolescence    -  Primary    Recurrent major depressive disorder, in remission (HCC)                Goals addressed in session: Goal 1          Reason for visit is No chief complaint on file.       Encounter provider Rubina Stock LPC      Recent Visits  No visits were found meeting these conditions.  Showing recent visits within past 7 days and meeting all other requirements  Today's Visits  Date Type Provider Dept   09/06/24 Telemedicine Rubina Stock LPC [facility] Therapist [address]   Showing today's visits and meeting all other requirements  Future Appointments  No visits were found meeting these conditions.  Showing future appointments within next 150 days and meeting all other requirements       The patient was identified by name and date of birth. Kamilah Ortega was informed that this is a telemedicine visit and that the visit is being conducted throughthe [employer] platform. She agrees to proceed..  My office door was closed. No one else was in the room.  She acknowledged consent and understanding of privacy and security of the video platform. The patient has agreed to participate and understands they can discontinue the visit at any time.    Patient is aware this is a billable service.     Subjective  Kamilah Ortega is a 23 y.o. female Behavioral Health Psychotherapy Progress Note    Psychotherapy Provided: Individual Psychotherapy     1. Relationship problems specific to childhood and adolescence        2. Recurrent major depressive disorder, in remission (HCC)            Goals addressed in session: Goal 1     DATA: Kamilah shared she's been very tired. Clinician validated her feelings, and processed  how she's been adapting to being a mother of 2. Kamilah shared her sister was over and her  has made an effort to help with the new born. Kamilah however reports she feels her partner has only been helpful because her sister is present. We worked on identifying the efforts made to support her by her . We discussed the importance of having her partner join and also communicating her needs. Clinician also advised she focus on her self-care. Clinician discussed how having a new born can be overwhelming. She was able to identify 3 things she can do to be mindful and have time for self-care. Clinician also shared that she will soon be transferring over to a new department. Clinician shared their last day will be October 11th, 2024. We discussed termination and the option of being transferred to a new provider. Clinician provided the dates of their upcoming appointments. We will continue to discuss termination upon the following sessions.    During this session, this clinician used the following therapeutic modalities: Engagement Strategies, Client-centered Therapy, Cognitive Behavioral Therapy, Cognitive Processing Therapy, Mindfulness-based Strategies, Motivational Interviewing, Solution-Focused Therapy, and Supportive Psychotherapy    Substance Abuse was addressed during this session. If the client is diagnosed with a co-occurring substance use disorder, please indicate any changes in the frequency or amount of use: Kamilah denies substance abuse. Stage of change for addressing substance use diagnoses: No substance use/Not applicable    ASSESSMENT:  Kamilah Ortega presents with a Euthymic/ normal mood.     her affect is Normal range and intensity, which is congruent, with her mood and the content of the session. The client has made progress on their goals.    Kamilah denies suicidal/homicidal ideations or self-injurious behaviors.  Kamilah Ortega presents with a none risk of suicide, none risk of  "self-harm, and none risk of harm to others.    For any risk assessment that surpasses a \"low\" rating, a safety plan must be developed.    A safety plan was indicated: no  If yes, describe in detail safety plan not indicated.    PLAN: Between sessions, Kamilah Ortega will take time for self-care. At the next session, the therapist will use Engagement Strategies, Client-centered Therapy, Cognitive Behavioral Therapy, Cognitive Processing Therapy, Mindfulness-based Strategies, Motivational Interviewing, Solution-Focused Therapy, and Supportive Psychotherapy to address Termination.    Behavioral Health Treatment Plan and Discharge Planning: Kamilah Ortega is aware of and agrees to continue to work on their treatment plan. They have identified and are working toward their discharge goals. yes    Visit start and stop times:    24  Start Time: 1105  Stop Time: 1155  Total Visit Time: 50 minutes .      HPI     Past Medical History:   Diagnosis Date    Asthma     Depression        Past Surgical History:   Procedure Laterality Date    [place]  DELIVERY ONLY N/A 2021    Procedure:  SECTION ();  Surgeon: Sari SAPP MD;  Location: [facility];  Service: Obstetrics       Current Outpatient Medications   Medication Sig Dispense Refill    acetaminophen (TYLENOL) 500 mg tablet Take 1 tablet (500 mg total) by mouth every 6 (six) hours as needed for mild pain or moderate pain for up to 14 days 56 tablet 0    albuterol (ProAir HFA) 90 mcg/act inhaler Inhale 2 puffs every 6 (six) hours as needed for wheezing 8 g 3    benzocaine-menthol-lanolin-aloe (DERMOPLAST) 20-0.5 % topical spray Apply 1 Application topically every 6 (six) hours as needed for mild pain or irritation      calcium carbonate (TUMS) 500 mg chewable tablet Chew 2 tablets (1,000 mg total) daily as needed for indigestion or heartburn      famotidine (PEPCID) 20 mg tablet Take 1 tablet (20 mg total) by mouth daily 60 tablet 1    " ferrous sulfate 324 (65 Fe) mg Take 1 tablet (324 mg total) by mouth daily before breakfast 30 tablet 5    hydrocortisone 1 % cream Apply 1 Application topically daily as needed for irritation or rash      ibuprofen (MOTRIN) 600 mg tablet Take 1 tablet (600 mg total) by mouth every 8 (eight) hours as needed for mild pain or moderate pain for up to 14 days 42 tablet 0    Prenatal Vit-Fe Fumarate-FA (Prenatal Plus Vitamin/Mineral) 27-1 MG TABS Take 1 tablet by mouth in the morning 90 tablet 4     No current facility-administered medications for this visit.        Allergies   Allergen Reactions    Fish-Derived Products - Food Allergy     Isoflavones (Soy)     Peanut Oil - Food Allergy        Review of Systems    Video Exam    There were no vitals filed for this visit.    Physical Exam

## 2024-09-11 ENCOUNTER — TELEPHONE (OUTPATIENT)
Dept: PSYCHIATRY | Facility: CLINIC | Age: 24
End: 2024-09-11

## 2024-09-13 ENCOUNTER — TELEPHONE (OUTPATIENT)
Dept: OBGYN CLINIC | Facility: CLINIC | Age: 24
End: 2024-09-13

## 2024-09-18 ENCOUNTER — POSTPARTUM VISIT (OUTPATIENT)
Dept: OBGYN CLINIC | Facility: CLINIC | Age: 24
End: 2024-09-18

## 2024-09-18 VITALS
WEIGHT: 149.6 LBS | HEIGHT: 61 IN | DIASTOLIC BLOOD PRESSURE: 74 MMHG | BODY MASS INDEX: 28.25 KG/M2 | HEART RATE: 76 BPM | SYSTOLIC BLOOD PRESSURE: 106 MMHG

## 2024-09-18 PROBLEM — Z83.3 FAMILY HISTORY OF DIABETES MELLITUS IN FIRST DEGREE RELATIVE: Status: RESOLVED | Noted: 2024-02-17 | Resolved: 2024-09-18

## 2024-09-18 PROBLEM — O34.219 HISTORY OF CESAREAN DELIVERY, CURRENTLY PREGNANT: Status: RESOLVED | Noted: 2024-02-17 | Resolved: 2024-09-18

## 2024-09-18 PROBLEM — O36.5990 IUGR (INTRAUTERINE GROWTH RESTRICTION) AFFECTING CARE OF MOTHER: Status: RESOLVED | Noted: 2024-07-09 | Resolved: 2024-09-18

## 2024-09-18 PROBLEM — O09.899 HISTORY OF PRETERM DELIVERY, CURRENTLY PREGNANT: Status: RESOLVED | Noted: 2024-02-21 | Resolved: 2024-09-18

## 2024-09-18 PROBLEM — O99.019 MATERNAL IRON DEFICIENCY ANEMIA COMPLICATING PREGNANCY: Status: RESOLVED | Noted: 2024-07-23 | Resolved: 2024-09-18

## 2024-09-18 PROBLEM — Z78.9 NOT IMMUNE TO HEPATITIS B VIRUS: Status: RESOLVED | Noted: 2024-02-13 | Resolved: 2024-09-18

## 2024-09-18 PROBLEM — O34.219 VBAC (VAGINAL BIRTH AFTER CESAREAN): Status: RESOLVED | Noted: 2024-08-21 | Resolved: 2024-09-18

## 2024-09-18 PROBLEM — Z3A.38 38 WEEKS GESTATION OF PREGNANCY: Status: RESOLVED | Noted: 2024-02-17 | Resolved: 2024-09-18

## 2024-09-18 PROBLEM — O99.519 ASTHMA DURING PREGNANCY: Status: RESOLVED | Noted: 2024-02-17 | Resolved: 2024-09-18

## 2024-09-18 PROBLEM — J45.909 ASTHMA DURING PREGNANCY: Status: RESOLVED | Noted: 2024-02-17 | Resolved: 2024-09-18

## 2024-09-18 PROBLEM — D50.9 MATERNAL IRON DEFICIENCY ANEMIA COMPLICATING PREGNANCY: Status: RESOLVED | Noted: 2024-07-23 | Resolved: 2024-09-18

## 2024-09-18 PROBLEM — R03.0 ELEVATED BLOOD PRESSURE READING WITHOUT DIAGNOSIS OF HYPERTENSION: Status: RESOLVED | Noted: 2024-08-23 | Resolved: 2024-09-18

## 2024-09-18 PROCEDURE — 99213 OFFICE O/P EST LOW 20 MIN: CPT | Performed by: NURSE PRACTITIONER

## 2024-09-18 NOTE — PATIENT INSTRUCTIONS
Thank you for your confidence in our team.   We appreciate you and welcome your feedback.   If you receive a survey from us, please take a few moments to let us know how we are doing.   Sincerely,  AL Staples       POSTPARTUM    You should contact your OBGYN provider if you experience any of the followin. Bleeding that soaks a pad every hour for 2 hours.   2. Foul odor coming from your vagina.   3. Fever of 100.4 or higher.   4. Incision or abdominal pain that will not go away despite prescribed pain medications.   5. Swelling, redness, discharge or bleeding from your  incision or perineal tear site.   6. Your incision begins to separate.   7. Problems urinating including inability to urinate, burning while urinating or extremely dark urine.   8. No bowel movement within 4 days of giving birth, or difficulty with bowel movements after that.   9. Any type of visual disturbance (double vision, blurring, etc).   10. Severe headache.   11. Flu-like symptoms.   12. Pain or redness in one or both of your breasts.   13. Pain, warmth, tenderness or swelling in your legs, especially the calf area.   14. Frequent nausea and vomiting.   15. Signs of depression or anxiety.   16. If you experience chest pains or have problems breathing, call 911 immediately.    In general you may resume sexual vaginal intercourse after 6 weeks of delivery.  It is important to continue changing your sanitary pads frequently and clean the perineum at least 2-3 times daily.

## 2024-09-18 NOTE — PROGRESS NOTES
"POSTPARTUM VISIT    Kamilah Ortega presents today for postpartum visit.  She had a  delivery on 2024.  Complications included first degree perineal laceration with repair.  She is breast and bottlefeeding her infant and reports no issues with such.  She desired Mirena IUD for contraception and it was inserted after delivery of placenta.  She was provided with [facility] Depression Screening tool and her score was 0.    Review of Systems:   -Constitutional: denies issues, denies pain   -Breasts: denies tenderness   -Gynecologic: lochia stopped last week   -Urinary: denies issues urinating   -GI: stools WNL, denies issues    Physical Exam:   -Vitals:   Vitals:    24 1406   BP: 106/74   BP Location: Left arm   Patient Position: Sitting   Cuff Size: Standard   Pulse: 76   Weight: 67.9 kg (149 lb 9.6 oz)   Height: 5' 1\" (1.549 m)      -General: A&Ox3, no acute distress noted   -Abdomen: soft, non-tender   -Extremities: nontender, no edema noted   -Breasts: deferred   -Pelvic exam:    External genitalia: nontender, no lesions or masses, perineum intact w/suture still present    Assessment/Plan:  1. Normal postpartum exam.  2. Depression screening negative.  3. Last pap smear was done 2024 and result was NILM.  Advise return for next annual GYN exam in 3/2025.  4. Contraception: Mirena IUD.  Patient too uncomfortable with speculum to check IUD strings today.  Return in 2 months for IUD check.  5. Return for 3rd HPV vaccine in series in 2 months with IUD check.      "

## 2024-09-20 ENCOUNTER — TELEMEDICINE (OUTPATIENT)
Dept: BEHAVIORAL/MENTAL HEALTH CLINIC | Facility: CLINIC | Age: 24
End: 2024-09-20

## 2024-09-20 DIAGNOSIS — F93.8: Primary | ICD-10-CM

## 2024-09-20 DIAGNOSIS — F33.40 RECURRENT MAJOR DEPRESSIVE DISORDER, IN REMISSION (HCC): ICD-10-CM

## 2024-09-26 ENCOUNTER — DOCUMENTATION (OUTPATIENT)
Dept: BEHAVIORAL/MENTAL HEALTH CLINIC | Facility: CLINIC | Age: 24
End: 2024-09-26

## 2024-09-26 DIAGNOSIS — F33.40 RECURRENT MAJOR DEPRESSIVE DISORDER, IN REMISSION (HCC): ICD-10-CM

## 2024-09-26 DIAGNOSIS — F93.8: Primary | ICD-10-CM

## 2024-09-26 NOTE — PROGRESS NOTES
TRANSFER SUMMARY    [facility]    Patient Name Kamilah Ortega     Date of Birth: 23 y.o. 2000      MRN: [id number]    Admission Date:  11/09/2023    Date of Transfer: September 26, 2024    Admission Diagnosis:     Relationship problems specific to childhood and adolescence   Recurrent major depressive disorder, in remission (HCC)     Current Diagnosis:     1. Relationship problems specific to childhood and adolescence        2. Recurrent major depressive disorder, in remission (HCC)            Reason for Admission: Kamilah presented for treatment due to depression, mood instability, and increased irritability. Primary complaints included DEPRESSIVE SYMPTOMS: low motivation, negative thoughts, mood swings, irritability and OTHER PROBLEMS: Relationship problems, arguments, lack of communication .     Progress in Treatment: Kamilah was seen for Individual Couseling, Couples Counseling, Cognitive Behavioral Therapy, [facility], and Coaching. During the course of treatment she and her  have been working on improving their communication. We've discussed active listening, and learning effective way of managing disagreements. Clinician also discussed post partum depression with Kamilah. We also began processing her relationship with her , and it has affected her. We've discussed forms to manage her emotions when engaging with her .    Episodes of Higher Level of Care: No    Transfer request Initiated by: Psychiatrist: None Therapist:  Rubina Stock MS, LPC, NCC, CCTP    Reason for Transfer Request: clinician leaving practice    Does this individual need a clinician with specialized training/expertise?: No    [facility] this client [facility] other [facility]? Psychiatrist: None Therapist: None    Other pertinent issues: None    Are there any specific individuals who would be a “best fit” or who have already agreed to accept  this transfer request?      Psychiatrist: None   Therapist:  NAYANA  Rationale: Not Applicable    Attempts to maintain the current therapeutic relationship: Not Applicable    Transfer request routed to Clinical Coordinator and Clinical Supervisor for input and/or approval.         Rubina Stock, LPC09/26/24

## 2024-10-04 ENCOUNTER — TELEMEDICINE (OUTPATIENT)
Dept: BEHAVIORAL/MENTAL HEALTH CLINIC | Facility: CLINIC | Age: 24
End: 2024-10-04
Payer: COMMERCIAL

## 2024-10-04 DIAGNOSIS — F93.8: Primary | ICD-10-CM

## 2024-10-04 DIAGNOSIS — F33.40 RECURRENT MAJOR DEPRESSIVE DISORDER, IN REMISSION (HCC): ICD-10-CM

## 2024-10-04 PROCEDURE — 90837 PSYTX W PT 60 MINUTES: CPT | Performed by: COUNSELOR

## 2024-10-04 NOTE — PSYCH
Virtual Regular Visit    Verification of patient location:    Patient is located at Home in the following state in which I hold an active license [place]      Assessment/Plan:    Problem List Items Addressed This Visit    None  Visit Diagnoses       Relationship problems specific to childhood and adolescence    -  Primary    Recurrent major depressive disorder, in remission (HCC)                Goals addressed in session: Goal 1 and Goal 2          Reason for visit is No chief complaint on file.       Encounter provider Rubina Stock LPC      Recent Visits  No visits were found meeting these conditions.  Showing recent visits within past 7 days and meeting all other requirements  Today's Visits  Date Type Provider Dept   10/04/24 Telemedicine Rubina Stock LPC [facility] Therapist [address]   Showing today's visits and meeting all other requirements  Future Appointments  No visits were found meeting these conditions.  Showing future appointments within next 150 days and meeting all other requirements       The patient was identified by name and date of birth. Kamilah Ortega was informed that this is a telemedicine visit and that the visit is being conducted throughthe Douj[employer] platform. She agrees to proceed..  My office door was closed. No one else was in the room.  She acknowledged consent and understanding of privacy and security of the video platform. The patient has agreed to participate and understands they can discontinue the visit at any time.    Patient is aware this is a billable service.     Subjective  Kamilah Ortega is a 23 y.o. female Behavioral Health Psychotherapy Progress Note    Psychotherapy Provided: Individual Psychotherapy     1. Relationship problems specific to childhood and adolescence        2. Recurrent major depressive disorder, in remission (HCC)            Goals addressed in session: Goal 1     DATA: Kamilah reports she's currently doing well, however she's had a depressive  "episode. Clinician actively listened to Kamilah and provided her with time and space to process her experience.   During this session, this clinician used the following therapeutic modalities: Engagement Strategies, Client-centered Therapy, Cognitive Behavioral Therapy, Cognitive Processing Therapy, Mindfulness-based Strategies, Motivational Interviewing, Solution-Focused Therapy, and Supportive Psychotherapy    Substance Abuse was not addressed during this session. If the client is diagnosed with a co-occurring substance use disorder, please indicate any changes in the frequency or amount of use: Kamilah denies substance use. Stage of change for addressing substance use diagnoses: No substance use/Not applicable    ASSESSMENT:  Kamilah Ortega presents with a Euthymic/ normal and Labile mood.     her affect is Normal range and intensity, which is congruent, with her mood and the content of the session. The client has made progress on their goals.    Kamilah denies suicidal/homicidal ideations or self-injurious behaviors.  Kamilah Ortega presents with a none risk of suicide, none risk of self-harm, and none risk of harm to others.    For any risk assessment that surpasses a \"low\" rating, a safety plan must be developed.    A safety plan was indicated: no  If yes, describe in detail safety plan not indicated.    PLAN: Between sessions, Kamilah Ortega will put to action the skills discussed in treatment and continue to utilize coping skills to help with managing her symptoms of depression.   Behavioral Health Treatment Plan and Discharge Planning: Kamilah Ortega is aware of and agrees to continue to work on their treatment plan. They have identified and are working toward their discharge goals. yes    Visit start and stop times:    10/04/24  Start Time: 1104  Stop Time: 1200  Total Visit Time: 56 minutes .      HPI     Past Medical History:   Diagnosis Date    Asthma     Depression        Past Surgical History: "   Procedure Laterality Date    [facility]  DELIVERY ONLY N/A 2021    Procedure:  SECTION ();  Surgeon: Sari SAPP MD;  Location: [facility];  Service: Obstetrics       Current Outpatient Medications   Medication Sig Dispense Refill    albuterol (ProAir HFA) 90 mcg/act inhaler Inhale 2 puffs every 6 (six) hours as needed for wheezing 8 g 3    famotidine (PEPCID) 20 mg tablet Take 1 tablet (20 mg total) by mouth daily 60 tablet 1    ferrous sulfate 324 (65 Fe) mg Take 1 tablet (324 mg total) by mouth daily before breakfast 30 tablet 5    hydrocortisone 1 % cream Apply 1 Application topically daily as needed for irritation or rash      Prenatal Vit-Fe Fumarate-FA (Prenatal Plus Vitamin/Mineral) 27-1 MG TABS Take 1 tablet by mouth in the morning 90 tablet 4     No current facility-administered medications for this visit.        Allergies   Allergen Reactions    Fish-Derived Products - Food Allergy     Isoflavones (Soy)     Peanut Oil - Food Allergy        Review of Systems    Video Exam    There were no vitals filed for this visit.    Physical Exam

## 2024-10-09 ENCOUNTER — TELEPHONE (OUTPATIENT)
Dept: PSYCHIATRY | Facility: CLINIC | Age: 24
End: 2024-10-09

## 2024-10-26 ENCOUNTER — NURSE TRIAGE (OUTPATIENT)
Dept: OTHER | Facility: OTHER | Age: 24
End: 2024-10-26

## 2024-10-26 NOTE — TELEPHONE ENCOUNTER
"Reason for Disposition   [1] Vaginal bleeding or spotting lasts > 4 weeks AND [2] red or red-brown    Answer Assessment - Initial Assessment Questions  1. ONSET: \"When did the bleeding start?\" \"Describe your bleeding: is it getting worse, staying the same, improving, or stopping and starting?\"      Postpartum bleeding - every time she has intercourse bleeding restarts   Also having constipation and diarrhea with cramps      2. BLEEDING SEVERITY: \"Describe the bleeding that you are having.\" \"How much bleeding is there?\"       Like regular period bleeding throughout the day  Still spotting and it has been a week since having intercourse     3. ABDOMEN PAIN: \"Do you have any pain?\" \"How bad is the pain?\"  (e.g., Scale 0-10; none, mild, moderate, or severe)      Denies, aside from constipation cramps     4. HORMONES: \"Are you taking any birth control medicines?\" (e.g., birth control pills, Depo-Provera)      Has an IUD    5. BLOOD THINNERS: \"Do you take any blood thinners?\" (e.g., aspirin, enoxaparin / Lovenox, warfarin / Coumadin)      Denies     6. OTHER SYMPTOMS: \"Do you have any other symptoms?\" (e.g., fever, chills, dizziness)      Denies     7. DELIVERY DATE: \"When was your delivery date?\" \"Vaginal delivery or ?\"      24  Vaginal delivery     8. BREASTFEEDING: \"Are you breastfeeding?\"      Yes    Protocols used: Postpartum - Vaginal Bleeding and Lochia-Adult-AH    "

## 2024-11-06 ENCOUNTER — TELEPHONE (OUTPATIENT)
Dept: OBGYN CLINIC | Facility: CLINIC | Age: 24
End: 2024-11-06

## 2024-11-06 NOTE — TELEPHONE ENCOUNTER
Voicemail:Tasha my name is Marycruz Ortega, birthday December 23rd 2000. I'm calling today because my IUD birth control has fallen out. I have no pain, no cramping. I have had bleeding since you know giving birth and having IUD. My breathing stopped and it's been it stopped and then it started all over again. But I might, I usually go out like there's nothing in my OV. Again. My name is Lori Jordan. Birthday December 23rd, 2000. Have a nice day.    Patient was called and scheduled for 11/14/24.

## 2024-11-14 ENCOUNTER — TELEPHONE (OUTPATIENT)
Age: 24
End: 2024-11-14

## 2024-11-19 ENCOUNTER — TELEPHONE (OUTPATIENT)
Age: 24
End: 2024-11-19

## 2024-11-19 ENCOUNTER — TELEPHONE (OUTPATIENT)
Dept: OBGYN CLINIC | Facility: CLINIC | Age: 24
End: 2024-11-19

## 2024-11-19 NOTE — TELEPHONE ENCOUNTER
Kamilah called back to check on her ARLEEN from Rubina Stock. She was asking for a return call so she knows a time frame of when she will get the next provider.

## 2025-01-21 ENCOUNTER — TELEPHONE (OUTPATIENT)
Age: 25
End: 2025-01-21

## 2025-01-21 NOTE — TELEPHONE ENCOUNTER
Received call from POD with patient regarding ARLEEN as previous provider left. Patient agreed to change to [place]. Writer scheduled Therapy ARLEEN for 2/29 6PM in office.

## 2025-01-23 ENCOUNTER — OFFICE VISIT (OUTPATIENT)
Dept: OBGYN CLINIC | Facility: CLINIC | Age: 25
End: 2025-01-23

## 2025-01-23 VITALS
SYSTOLIC BLOOD PRESSURE: 114 MMHG | WEIGHT: 136 LBS | DIASTOLIC BLOOD PRESSURE: 76 MMHG | HEIGHT: 61 IN | BODY MASS INDEX: 25.68 KG/M2 | HEART RATE: 80 BPM

## 2025-01-23 DIAGNOSIS — Z30.09 UNWANTED FERTILITY: Primary | ICD-10-CM

## 2025-01-23 DIAGNOSIS — Z23 NEED FOR HPV VACCINATION: ICD-10-CM

## 2025-01-23 PROCEDURE — 90651 9VHPV VACCINE 2/3 DOSE IM: CPT | Performed by: NURSE PRACTITIONER

## 2025-01-23 PROCEDURE — 99213 OFFICE O/P EST LOW 20 MIN: CPT | Performed by: NURSE PRACTITIONER

## 2025-01-23 PROCEDURE — 90471 IMMUNIZATION ADMIN: CPT | Performed by: NURSE PRACTITIONER

## 2025-01-23 RX ORDER — NORETHINDRONE ACETATE AND ETHINYL ESTRADIOL 1MG-20(21)
1 KIT ORAL DAILY
Qty: 28 TABLET | Refills: 3 | Status: SHIPPED | OUTPATIENT
Start: 2025-01-23

## 2025-01-23 NOTE — PROGRESS NOTES
Patient given 3rd HPV on left deltoid on 1/23/25      NDC# 7228-8767-90  LOT# [id number]  EXP# [id number]

## 2025-01-23 NOTE — PROGRESS NOTES
"PROBLEM GYNECOLOGICAL VISIT    Kamilah Ortega is a 24 y.o. female who presents today with complaint of unwanted fertility.  Her general medical history has been reviewed and she reports it as follows:    Past Medical History:   Diagnosis Date    Asthma     Depression      Past Surgical History:   Procedure Laterality Date    [place]  DELIVERY ONLY N/A 2021    Procedure:  SECTION ();  Surgeon: Sari SAPP MD;  Location: [facility];  Service: Obstetrics     OB History          2    Para   2    Term   1       1    AB   0    Living   2         SAB   0    IAB   0    Ectopic   0    Multiple   0    Live Births   2               Social History     Tobacco Use    Smoking status: Never    Smokeless tobacco: Never   Vaping Use    Vaping status: Never Used   Substance Use Topics    Alcohol use: Not Currently    Drug use: Not Currently     Types: Marijuana     Comment: not in past 2 years     Social History     Substance and Sexual Activity   Sexual Activity Yes    Partners: Male    Birth control/protection: Condom Male       Current Outpatient Medications   Medication Instructions    albuterol (ProAir HFA) 90 mcg/act inhaler 2 puffs, Inhalation, Every 6 hours PRN       History of Present Illness:   Reports her Mirena IUD fell out spontaneously on 2024.  She reports menses regular since then.  She has been using condoms for contraception since then.  She desires to try OCP's for contraception now instead.  She also desires to get her 3rd HPV vaccine today which is overdue.    Review of Systems:  Review of Systems   Constitutional: Negative.    Gastrointestinal: Negative.    Genitourinary: Negative.  Negative for menstrual problem.       Physical Exam:  /76 (BP Location: Left arm, Patient Position: Sitting, Cuff Size: Standard)   Pulse 80   Ht 5' 1\" (1.549 m)   Wt 61.7 kg (136 lb)   LMP 2025 (Approximate)   BMI 25.70 kg/m²   Physical Exam  Constitutional:       " General: She is not in acute distress.  Neurological:      Mental Status: She is alert.   Skin:     General: Skin is warm and dry.   Vitals reviewed.       Assessment:   1. Unwanted fertility.    Plan:   1. Rx OCP's sent to pharmacy and patient given instructions on appropriate administration technique.   2. Given HPV vaccine.   3. Return to office as previously scheduled for annual GYN exam in 2 months.  Will do OCP surveillance visit at that time.   4. Patient's depression screening was assessed with a PHQ-2 score of 0. Clinically patient does not have depression. No treatment is required.

## 2025-01-23 NOTE — PATIENT INSTRUCTIONS
Thank you for your confidence in our team.   We appreciate you and welcome your feedback.   If you receive a survey from us, please take a few moments to let us know how we are doing.   Sincerely,  AL Staples

## 2025-01-29 ENCOUNTER — OFFICE VISIT (OUTPATIENT)
Dept: BEHAVIORAL/MENTAL HEALTH CLINIC | Facility: CLINIC | Age: 25
End: 2025-01-29
Payer: COMMERCIAL

## 2025-01-29 DIAGNOSIS — F43.24 ADJUSTMENT DISORDER WITH DISTURBANCE OF CONDUCT: Primary | ICD-10-CM

## 2025-01-29 PROCEDURE — 90834 PSYTX W PT 45 MINUTES: CPT | Performed by: COUNSELOR

## 2025-01-29 NOTE — BH CRISIS PLAN
Client Name: Kamilah Ortega       Client YOB: 2000    Анна Safety Plan      Creation Date: 1/29/25    Created By: ALICIA Real       Step 1: Warning Signs:   Warning Signs   [place]   irritable            Step 2: Internal Coping Strategies:   Internal Coping Strategies   Listen to gospel music   Go to sleep   Isolate            Step 3: People and social settings that provide distraction:   Name Contact Information   go to [place] with friend    Husbands Aunt    Mother    Stephanie             Step 4: People whom I can ask for help during a crisis:      Name Contact Information    Mother       Step 5: Professionals or agencies I can contact during a crisis:      Local Emergency Department Emergency Department Phone Emergency Department Address    911          Crisis Phone Numbers:   Suicide Prevention Lifeline: Call or Text  045 Crisis Text Line: Text HOME to 299-316   Please note: Some [facility] do not have a separate number for Child/Adolescent specific crisis. If your county is not listed under Child/Adolescent, please call the adult number for your county      Adult Crisis Numbers: Child/Adolescent Crisis Numbers   [facility]: [phone] [facility]: [phone]   [facility]: [phone] [facility]: [phone]   [facility]: [phone] [place]: [phone]   [facility]: [phone] [place]: [phone]   [place]: [phone]   [facility]: [phone]   [facility]: [phone]   [place] [facility]: [phone] (daytime) [phone] (after hours, weekends, holidays)      Step 6: Making the environment safer (plan for lethal means safety):   Patient did not identify any lethal methods: Yes     Optional: What is most important to me and worth living for?   My children, Future     Анна Safety Plan. Cathy Jorge and Juanito Balbuena. Used with permission of the  authors.

## 2025-01-29 NOTE — BH TREATMENT PLAN
"Outpatient Behavioral Health Psychotherapy Treatment Plan     Kamilah Ortega  2000      Date of Initial Psychotherapy Assessment: 11/21/23   Date of Current Treatment Plan: 1/29/25  Treatment Plan Target Date: 7/28/25  Treatment Plan Expiration Date: TBD     Diagnosis:   1. Relationship problems specific to childhood and adolescence          2. Recurrent major depressive disorder, in remission (HCC)                 Area(s) of Need: Improve self control and be able to communicate better     Long Term Goal 1 (in the client's own words): Kamilah states\"\"I want to stand strong on Boundaries in  relationship        Stage of Change: Action     Target Date for completion: TBD             Anticipated therapeutic modalities: Engagement Strategies, Client-centered Therapy, Cognitive Behavioral Therapy, Cognitive Processing Therapy, Family Therapy, Mindfulness-based Strategies, Motivational Interviewing, Music Therapy Techniques, Solution-Focused Therapy, Supportive Psychotherapy, Psycho-Education, and Couples Counseling.             People identified to complete this goal: Bethel Damon and therapist                    Objective 1: (identify the means of measuring success in meeting the objective): Identify aspects of may that can support daily struggles.                    Objective 2: (identify the means of measuring success in meeting the objective): Be able to express anger without yelling and using foul language     Objective 3: (identify the means of measuring success in meeting the objective): Learning and applying active listening skill                We am currently under the care of a [facility] psychiatric provider: no     My [facility] psychiatric provider is: N/A     We am currently taking psychiatric medications: No     we feel that we will be ready for discharge from mental health care when we reach the following (measurable goal/objective): I will be able to deal with conflict " without exploding and I will be able to stand firm on boundaries and feel at peace with my ability to resolve conflicts.     For children and adults who have a legal guardian:          Has there been any change to custody orders and/or guardianship status? NA. If yes, attach updated documentation.     I have created my Crisis Plan and have been offered a copy of this plan     Behavioral Health Treatment Plan  Luke: Diagnosis and Treatment Plan explained to Kamilah Ortega acknowledges an understanding of their diagnosis. Kamilah Ortega agrees to this treatment plan.     I have been offered a copy of this Treatment Plan. yes

## 2025-01-29 NOTE — PSYCH
Behavioral Health Psychotherapy Progress Note    Psychotherapy Provided: Individual Psychotherapy     1. Adjustment disorder with disturbance of conduct            Goals addressed in session: Goal 1     DATA: Met with Kamilah for this initial individual therapy session due to transferring of care from her previous counselor.  She shared that previously her and her  engaged in some marital counseling, but at this point feels she would like to primarily focus on individual goals for herself but ultimately would like to include her .  She shared that she is aware that if he is not engaged that it is highly unlikely they will be able to make progress.  She was receptive to participate in updating her treatment plan and was able to identify appropriate goals surrounding learning more self-control establishing boundaries in the relationship and learning to communicate appropriately.  She was also able to identify that she would like to incorporate her may into her goals in order to build more self-awareness and coping skills.  During this session, this clinician used the following therapeutic modalities: Cognitive Behavioral Therapy, Cognitive Processing Therapy, and Solution-Focused Therapy    Substance Abuse was not addressed during this session. If the client is diagnosed with a co-occurring substance use disorder, please indicate any changes in the frequency or amount of use: NA. Stage of change for addressing substance use diagnoses: No substance use/Not applicable    ASSESSMENT:  Kamilah Ortega presents with a Euthymic/ normal mood.     her affect is Normal range and intensity, which is congruent, with her mood and the content of the session. The client has made progress on their goals.    Kamilah was able to Identify goals to include I a comprehensive treatment plan.  She was also able to acknowledge that her anger and rage is a barrier in her relationship. Kamilah Ortega presents with a none risk  "of suicide, none risk of self-harm, and none risk of harm to others.    For any risk assessment that surpasses a \"low\" rating, a safety plan must be developed.    A safety plan was indicated: no  If yes, describe in detail Safety plan updated    PLAN: Between sessions, Kamilah Ortega will Attend bi-weekly therapy sessions. At the next session, the therapist will use Client-centered Therapy, Cognitive Behavioral Therapy, and Solution-Focused Therapy to address Communication and anger management.    Behavioral Health Treatment Plan and Discharge Planning: Kamilah Ortega is aware of and agrees to continue to work on their treatment plan. They have identified and are working toward their discharge goals. yes    Depression Follow-up Plan Completed: Not applicable    Visit start and stop times:    01/29/25  Start Time: 1815  Stop Time: 1855  Total Visit Time: 40 minutes  "

## 2025-02-20 ENCOUNTER — TELEMEDICINE (OUTPATIENT)
Dept: BEHAVIORAL/MENTAL HEALTH CLINIC | Facility: CLINIC | Age: 25
End: 2025-02-20
Payer: COMMERCIAL

## 2025-02-20 DIAGNOSIS — F43.24 ADJUSTMENT DISORDER WITH DISTURBANCE OF CONDUCT: Primary | ICD-10-CM

## 2025-02-20 PROCEDURE — 90834 PSYTX W PT 45 MINUTES: CPT | Performed by: COUNSELOR

## 2025-02-20 NOTE — PSYCH
Virtual Regular VisitName: Kamilah Ortega      : 2000      MRN: [id number]  Encounter Provider: ALICIA Real  Encounter Date: 2025   Encounter department: [facility] [place]  :  Assessment & Plan  Adjustment disorder with disturbance of conduct             Goals addressed in session: Goal 1     Depression Follow-up Plan Completed: Not applicable    Reason for visit is No chief complaint on file.     Recent Visits  No visits were found meeting these conditions.  Showing recent visits within past 7 days and meeting all other requirements  Today's Visits  Date Type Provider Dept   25 Telemedicine ALICIA Real [facility] [place]   Showing today's visits and meeting all other requirements  Future Appointments  No visits were found meeting these conditions.  Showing future appointments within next 150 days and meeting all other requirements     History of Present Illness     Kamilah Ortega is a 24 y.o. female  .  HPI    Past Medical History:   Diagnosis Date    Asthma     Depression      Past Surgical History:   Procedure Laterality Date    [place]  DELIVERY ONLY N/A 2021    Procedure:  SECTION ();  Surgeon: Sari SAPP MD;  Location: [facility];  Service: Obstetrics     Current Outpatient Medications   Medication Instructions    albuterol (ProAir HFA) 90 mcg/act inhaler 2 puffs, Inhalation, Every 6 hours PRN    norethindrone-ethinyl estradiol (JUNEL FE 1/20) 1-20 MG-MCG per tablet 1 tablet, Oral, Daily     Allergies   Allergen Reactions    Fish-Derived Products - Food Allergy     Isoflavones (Soy)     Peanut Oil - Food Allergy        Review of Systems    Objective   LMP 2025 (Approximate)     Video Exam  Physical Exam     Administrative Statements   Encounter provider ALICIA Real    The Patient is located at Home and in the following state in which I hold an active license [place].    The  patient was identified by name and date of birth. Kamilah Ortega was informed that this is a telemedicine visit and that the visit is being conducted through the Epic Embedded platform. She agrees to proceed..  My office door was closed. No one else was in the room.  She acknowledged consent and understanding of privacy and security of the video platform. The patient has agreed to participate and understands they can discontinue the visit at any time.  Behavioral Health Psychotherapy Progress Note    Psychotherapy Provided: Individual Psychotherapy     1. Adjustment disorder with disturbance of conduct            Goals addressed in session: Goal 1     DATA: Met with Kamilah mock for an individual therapy session.  We spent the first portion of the session checking in concerning how she has been doing. She shared that she was recently offered a job, and that she was excited about this, but was concerned with the two weeks of training she would need to participate with and her husbands lack of support to help with this.  Yon processed some of her feelings concerning some recent arguments that she has been having with her  surrounding feelings of not feeling supported. We spent time reviewing positive ways to cope with her circumstance. We discussed the importance of creating a plan to aid her in completing what she needs to to complete her training. We discussed keeping a positive mindset through daily devotions and prayer.  We also spent time discussing making a plan for her to complete her GED.  During this session, this clinician used the following therapeutic modalities: Cognitive Behavioral Therapy, Cognitive Processing Therapy, Motivational Interviewing, and Solution-Focused Therapy    Substance Abuse was not addressed during this session. If the client is diagnosed with a co-occurring substance use disorder, please indicate any changes in the frequency or amount of use: NA. Stage of change for  "addressing substance use diagnoses: No substance use/Not applicable    ASSESSMENT:  Kamilah Ortega presents with a Euthymic/ normal mood.     her affect is Normal range and intensity, which is congruent, with her mood and the content of the session. The client has made progress on their goals.    Yaw has been  using prayer as a way to cope with aspects of her life she does not have power to change Kamilah Ortega presents with a none risk of suicide, none risk of self-harm, and none risk of harm to others.    For any risk assessment that surpasses a \"low\" rating, a safety plan must be developed.    A safety plan was indicated: no  If yes, describe in detail NA    PLAN: Between sessions, Kamilah Ortega will Enjoy her trip with her family to [place] and refrain from arguing with her .. At the next session, the therapist will use Cognitive Behavioral Therapy to address Communication and anger management.    Behavioral Health Treatment Plan and Discharge Planning: Kamilah Ortega is aware of and agrees to continue to work on their treatment plan. They have identified and are working toward their discharge goals. yes    Depression Follow-up Plan Completed: Not applicable    Visit start and stop times:    02/20/25  Start Time: 1300  Stop Time: 1353  Total Visit Time: 53 minutes      "

## 2025-02-25 ENCOUNTER — TELEPHONE (OUTPATIENT)
Dept: PSYCHIATRY | Facility: CLINIC | Age: 25
End: 2025-02-25

## 2025-02-25 NOTE — TELEPHONE ENCOUNTER
Left voicemail informing patient and/or parent/guardian of the Psych Encounter form needing to be signed as a requirement from the insurance company for billing purposes. Patient can access form via [employer] and sign electronically.     Please make patient aware this form must be signed for each visit as a requirement to continue future visits with provider.    Virtual Encounter form 2/20/25 call #1

## 2025-03-03 ENCOUNTER — TELEPHONE (OUTPATIENT)
Dept: PSYCHIATRY | Facility: CLINIC | Age: 25
End: 2025-03-03

## 2025-03-03 NOTE — TELEPHONE ENCOUNTER
Left voicemail informing patient and/or parent/guardian of the Psych Encounter form needing to be signed as a requirement from the insurance company for billing purposes. Patient can access form via [employer] and sign electronically.     Please make patient aware this form must be signed for each visit as a requirement to continue future visits with provider.

## 2025-03-04 ENCOUNTER — TELEMEDICINE (OUTPATIENT)
Dept: BEHAVIORAL/MENTAL HEALTH CLINIC | Facility: CLINIC | Age: 25
End: 2025-03-04
Payer: COMMERCIAL

## 2025-03-04 DIAGNOSIS — F43.24 ADJUSTMENT DISORDER WITH DISTURBANCE OF CONDUCT: Primary | ICD-10-CM

## 2025-03-04 PROCEDURE — 90837 PSYTX W PT 60 MINUTES: CPT | Performed by: COUNSELOR

## 2025-03-04 NOTE — PSYCH
Virtual Regular Visit    Verification of patient location:    Patient is located at Home in the following state in which I hold an active license [place]      Assessment/Plan:    Problem List Items Addressed This Visit       Adjustment disorder with disturbance of conduct - Primary           Reason for visit is No chief complaint on file.       Encounter provider ALICIA Real      Recent Visits  Date Type Provider Dept   25 Telephone ALICIA Real [facility] [place]   Showing recent visits within past 7 days and meeting all other requirements  Today's Visits  Date Type Provider Dept   25 Telemedicine ALICIA Real [facility]rap[facility] [place]   Showing today's visits and meeting all other requirements  Future Appointments  No visits were found meeting these conditions.  Showing future appointments within next 150 days and meeting all other requirements       The patient was identified by name and date of birth. Kamilah Ortega was informed that this is a telemedicine visit and that the visit is being conducted throughthe Epic Embedded platform. She agrees to proceed..  My office door was closed. No one else was in the room.  She acknowledged consent and understanding of privacy and security of the video platform. The patient has agreed to participate and understands they can discontinue the visit at any time.    Patient is aware this is a billable service.     Subjective  Kamilah Ortega is a 24 y.o. female  .      HPI     Past Medical History:   Diagnosis Date    Asthma     Depression        Past Surgical History:   Procedure Laterality Date    [place]  DELIVERY ONLY N/A 2021    Procedure:  SECTION ();  Surgeon: Sari SAPP MD;  Location: [facility];  Service: Obstetrics       Current Outpatient Medications   Medication Sig Dispense Refill    albuterol (ProAir HFA) 90 mcg/act inhaler Inhale 2 puffs every 6 (six) hours as needed for  wheezing 8 g 3    norethindrone-ethinyl estradiol (JUNEL FE 1/20) 1-20 MG-MCG per tablet Take 1 tablet by mouth daily 28 tablet 3     No current facility-administered medications for this visit.        Allergies   Allergen Reactions    Fish-Derived Products - Food Allergy     Isoflavones (Soy)     Peanut Oil - Food Allergy        Review of Systems    Video Exam    There were no vitals filed for this visit.    Physical Exam           Behavioral Health Psychotherapy Progress Note    Psychotherapy Provided: Individual Psychotherapy     1. Adjustment disorder with disturbance of conduct            Goals addressed in session: Goal 1     DATA: Met with Kamilah mock for an individual therapy session. We spent the first portion of the session checking in concerning how she was doing.  She shared that overall she was doing good, and was able to keep from arguing  on her recent family trip, but did have a couple disagreements while traveling in the car to their destination in which they worked through with minimal difficulty.  She shared that overall they were able to enjoy their time together. Kamilah was able to process a lot of her most recent frustrations concerning the process of obtaining a new job as well as communication issues with her .  During this session, this clinician used the following therapeutic modalities: Cognitive Behavioral Therapy, Cognitive Processing Therapy, Motivational Interviewing, and Solution-Focused Therapy    Substance Abuse was not addressed during this session. If the client is diagnosed with a co-occurring substance use disorder, please indicate any changes in the frequency or amount of use: NA. Stage of change for addressing substance use diagnoses: No substance use/Not applicable    ASSESSMENT:  Kamilah Ortega presents with a Euthymic/ normal and Anxious mood.     her affect is Normal range and intensity, which is congruent, with her mood and the content of the session. The  "client has made progress on their goals.    Kamilah was able to acknowledge receiving a lot of the communication from her  as negative when it may not be intended as such. Kamilah Ortega presents with a none risk of suicide, none risk of self-harm, and none risk of harm to others.    For any risk assessment that surpasses a \"low\" rating, a safety plan must be developed.    A safety plan was indicated: no  If yes, describe in detail NA    PLAN: Between sessions, Kamilah Ortega will Make a list and ask her  to also make a list of 3 things they would like the other to work on improving with regards to communication.. At the next session, the therapist will use Cognitive Behavioral Therapy, Cognitive Processing Therapy, Motivational Interviewing, and Solution-Focused Therapy to address Frustration tolerance and communication..    Behavioral Health Treatment Plan and Discharge Planning: Kamilah Ortega is aware of and agrees to continue to work on their treatment plan. They have identified and are working toward their discharge goals. yes    Depression Follow-up Plan Completed: Not applicable    Visit start and stop times:    03/04/25  Start Time: 1800  Stop Time: 1853  Total Visit Time: 53 minutes  "

## 2025-03-07 ENCOUNTER — TELEPHONE (OUTPATIENT)
Dept: PSYCHIATRY | Facility: CLINIC | Age: 25
End: 2025-03-07

## 2025-03-14 ENCOUNTER — TELEPHONE (OUTPATIENT)
Dept: PSYCHIATRY | Facility: CLINIC | Age: 25
End: 2025-03-14

## 2025-03-14 NOTE — TELEPHONE ENCOUNTER
Left voicemail informing patient and/or parent/guardian of the Psych Encounter form needing to be signed as a requirement from the insurance company for billing purposes. Patient can access form via [employer] and sign electronically.     Please make patient aware this form must be signed for each visit as a requirement to continue future visits with provider.

## 2025-03-20 ENCOUNTER — TELEMEDICINE (OUTPATIENT)
Dept: BEHAVIORAL/MENTAL HEALTH CLINIC | Facility: CLINIC | Age: 25
End: 2025-03-20
Payer: COMMERCIAL

## 2025-03-20 DIAGNOSIS — F43.24 ADJUSTMENT DISORDER WITH DISTURBANCE OF CONDUCT: Primary | ICD-10-CM

## 2025-03-20 PROCEDURE — 90834 PSYTX W PT 45 MINUTES: CPT | Performed by: COUNSELOR
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
## 2025-03-25 ENCOUNTER — TELEPHONE (OUTPATIENT)
Dept: PSYCHIATRY | Facility: CLINIC | Age: 25
End: 2025-03-25

## 2025-03-31 ENCOUNTER — TELEPHONE (OUTPATIENT)
Dept: OBGYN CLINIC | Facility: CLINIC | Age: 25
End: 2025-03-31

## 2025-04-09 ENCOUNTER — TELEPHONE (OUTPATIENT)
Age: 25
End: 2025-04-09

## 2025-04-09 NOTE — TELEPHONE ENCOUNTER
Patient is calling regarding cancelling an appointment.    Date/Time: 4/9 @12pm vv.     Reason: work    Patient was rescheduled: YES [] NO [x]  If yes, when was Patient reschedule for: patient has f/u appt scheduled.     Patient requesting call back to reschedule: YES [] NO [x]

## 2025-04-17 ENCOUNTER — TELEPHONE (OUTPATIENT)
Age: 25
End: 2025-04-17

## 2025-04-21 ENCOUNTER — TELEPHONE (OUTPATIENT)
Dept: PSYCHIATRY | Facility: CLINIC | Age: 25
End: 2025-04-21

## 2025-04-21 NOTE — TELEPHONE ENCOUNTER
NO-SHOW LETTER MAILED TO Kamilah Ortega.  ADDRESS: [address] [place] [id number]  SENT VIA [employer]

## 2025-04-21 NOTE — LETTER
25     Kamilah Ortega   : 2000   [address] [place] [facility] [place] [facility] [address]       It is the policy of [facility] to monitor and manage appointments that have been no-showed or cancelled with less than 48-hour notice. This is necessary to ensure that we are able to provide timely access for all patients to our providers. Undue numbers of unutilized appointments delays necessary medical care for all patients.      Dear Kamilah Ortega       We are sorry that you missed your appointment with ALICIA Real on 2025. It has been 1 month or more since your last appointment. Your health and follow-up care are important to us. We want to make you aware of your next appointment with ALICIA Real that is scheduled for Thursday, 2025 at 1:00 pm.    Please be aware that our office policy states that if you 'no show' two or more Therapy appointments without prior notice of cancellation within in a calendar year, you may be discharged from Therapy treatment.  We want to bring this to your attention now to prevent an interruption of your care.  If you have any questions about this policy, please call us at the number above.     If we do not hear from you within 10 business days to make a follow up appointment, we will assume you are no longer interested in care here.    Thank you in advance for your cooperation and assistance.       Sincerely,      [facility] Support Staff

## 2025-05-01 ENCOUNTER — TELEMEDICINE (OUTPATIENT)
Dept: BEHAVIORAL/MENTAL HEALTH CLINIC | Facility: CLINIC | Age: 25
End: 2025-05-01
Payer: COMMERCIAL

## 2025-05-01 DIAGNOSIS — F43.24 ADJUSTMENT DISORDER WITH DISTURBANCE OF CONDUCT: Primary | ICD-10-CM

## 2025-05-01 PROCEDURE — 90837 PSYTX W PT 60 MINUTES: CPT | Performed by: COUNSELOR
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
## 2025-05-06 ENCOUNTER — TELEPHONE (OUTPATIENT)
Dept: PSYCHIATRY | Facility: CLINIC | Age: 25
End: 2025-05-06

## 2025-05-06 NOTE — TELEPHONE ENCOUNTER
Left voicemail informing patient and/or parent/guardian of the Psych Encounter form needing to be signed as a requirement from the insurance company for billing purposes. Patient can access form via [employer] and sign electronically.     Please make patient aware this form must be signed for each visit as a requirement to continue future visits with provider.

## 2025-05-12 ENCOUNTER — TELEPHONE (OUTPATIENT)
Dept: PSYCHIATRY | Facility: CLINIC | Age: 25
End: 2025-05-12

## 2025-05-12 NOTE — TELEPHONE ENCOUNTER
Left voicemail informing patient and/or parent/guardian of the Psych Encounter form needing to be signed as a requirement from the insurance company for billing purposes. Patient can access form via [employer] and sign electronically.     Please make patient aware this form must be signed for each visit as a requirement to continue future visits with provider.

## 2025-05-15 ENCOUNTER — TELEPHONE (OUTPATIENT)
Age: 25
End: 2025-05-15

## 2025-05-15 ENCOUNTER — TELEMEDICINE (OUTPATIENT)
Dept: BEHAVIORAL/MENTAL HEALTH CLINIC | Facility: CLINIC | Age: 25
End: 2025-05-15
Payer: COMMERCIAL

## 2025-05-15 DIAGNOSIS — F43.24 ADJUSTMENT DISORDER WITH DISTURBANCE OF CONDUCT: Primary | ICD-10-CM

## 2025-05-15 PROCEDURE — 90834 PSYTX W PT 45 MINUTES: CPT | Performed by: COUNSELOR

## 2025-05-15 NOTE — TELEPHONE ENCOUNTER
Pt called having issues signing into her virtual visit and requested a call from provider. Writer contacted provider to call pt. Provider acknowledged and stated that she was calling pt.

## 2025-05-15 NOTE — PSYCH
Virtual Regular VisitName: Kamilah Ortega      : 2000      MRN: [id number]  Encounter Provider: ALICIA Real  Encounter Date: 5/15/2025   Encounter department: [facility] THERAPIST [place]  :  Assessment & Plan  Adjustment disorder with disturbance of conduct             Goals addressed in session: Goal 1     DATA:  met with Kamilah mock for an individual therapy session.  We spent the first portion of the session checking and concerning how she has been doing since her last appointment.  She shared that overall she has been doing good and that she is feeling very committed to her job working at the [employer].  She shared that working there has inspired her to want to be more of an  and one day have her own business.  She shared that in order to do so she has been been experiencing a lot of strong feelings concerning becoming more educated and feels the next step for her should be to complete her GED.  We spent some time exploring what this looks like to her in regards to steps she would need to take and having the ability to stay organized and motivated.  We walked through steps she would need to take in order to schedule to complete her GED testing.  We ended the session with outlining  these action steps.   During this session, this clinician used the following therapeutic modalities: Client-centered Therapy, Cognitive Behavioral Therapy, and Cognitive Processing Therapy    Substance Abuse was not addressed during this session. If the client is diagnosed with a co-occurring substance use disorder, please indicate any changes in the frequency or amount of use: NA. Stage of change for addressing substance use diagnoses: No substance use/Not applicable    ASSESSMENT:  Kamilah presents with a Euthymic/ normal mood. Natas affect is Normal range and intensity, which is congruent, with their mood and the content of the session. The client has made progress on  "their goals as evidenced by increased motivation to work on educational goal of completing GED.    Kamilah presents with a none risk of suicide, none risk of self-harm, and none risk of harm to others.    For any risk assessment that surpasses a \"low\" rating, a safety plan must be developed.    A safety plan was indicated: no  If yes, describe in detail NA    PLAN: Between sessions, Kamilah will make an appointment at the [facility] to explore criteria for completing GED.. At the next session, the therapist will use Client-centered Therapy, Cognitive Behavioral Therapy, and Cognitive Processing Therapy to address Decision making.    Behavioral Health Treatment Plan [facility]: Diagnosis and Treatment Plan explained to Kamilah, Kamilah relates understanding diagnosis and is agreeable to Treatment Plan. Yes     Depression Follow-up Plan Completed: Not applicable     Reason for visit is No chief complaint on file.     Recent Visits  Date Type Provider Dept   25 Telephone ALICIA Real [facility] [place]   Showing recent visits within past 7 days and meeting all other requirements  Today's Visits  Date Type Provider Dept   05/15/25 Telephone ALICIA Real [facility]   05/15/25 Telemedicine ALICIA Real [facility]rap[facility] [place]   Showing today's visits and meeting all other requirements  Future Appointments  No visits were found meeting these conditions.  Showing future appointments within next 150 days and meeting all other requirements     History of Present Illness     HPI    Past Medical History   Past Medical History:   Diagnosis Date    Asthma     Depression      Past Surgical History:   Procedure Laterality Date    [place]  DELIVERY ONLY N/A 2021    Procedure:  SECTION ();  Surgeon: Sari SAPP MD;  Location: [facility];  Service: Obstetrics     Current Outpatient Medications   Medication Instructions    albuterol (ProAir HFA) 90 " mcg/act inhaler 2 puffs, Inhalation, Every 6 hours PRN    norethindrone-ethinyl estradiol (JUNEL FE 1/20) 1-20 MG-MCG per tablet 1 tablet, Oral, Daily     Allergies[1]    Objective   There were no vitals taken for this visit.    Video Exam  Physical Exam     Administrative Statements   Encounter provider ALICIA Real    The Patient is located at Other and in the following state in which I hold an active license [place].    The patient was identified by name and date of birth. Kamilah Ortega was informed that this is a telemedicine visit and that the visit is being conducted through the Microsoft Teams platform. She agrees to proceed..  My office door was closed. No one else was in the room.  She acknowledged consent and understanding of privacy and security of the video platform. The patient has agreed to participate and understands they can discontinue the visit at any time.    I have spent a total time of 41 minutes in caring for this patient on the day of the visit/encounter including Counseling / Coordination of care, not including the time spent for establishing the audio/video connection.    Visit Time  Start Time: 1306  Stop Time: 1347  Total Visit Time: 41 minutes       [1]   Allergies  Allergen Reactions    Fish-Derived Products - Food Allergy     Isoflavones (Soy)     Peanut Oil - Food Allergy

## 2025-05-20 ENCOUNTER — TELEPHONE (OUTPATIENT)
Dept: PSYCHIATRY | Facility: CLINIC | Age: 25
End: 2025-05-20

## 2025-05-20 NOTE — TELEPHONE ENCOUNTER
Left voicemail informing patient and/or parent/guardian of the Psych Encounter form needing to be signed as a requirement from the insurance company for billing purposes. Patient can access form via CX and sign electronically.     Please make patient aware this form must be signed for each visit as a requirement to continue future visits with provider.    Virtual Encounter form 5/15/25 call #1

## 2025-05-22 ENCOUNTER — OFFICE VISIT (OUTPATIENT)
Dept: URGENT CARE | Age: 25
End: 2025-05-22
Payer: COMMERCIAL

## 2025-05-22 VITALS
RESPIRATION RATE: 20 BRPM | SYSTOLIC BLOOD PRESSURE: 124 MMHG | OXYGEN SATURATION: 99 % | DIASTOLIC BLOOD PRESSURE: 60 MMHG | HEART RATE: 92 BPM | TEMPERATURE: 97.7 F

## 2025-05-22 DIAGNOSIS — H66.002 NON-RECURRENT ACUTE SUPPURATIVE OTITIS MEDIA OF LEFT EAR WITHOUT SPONTANEOUS RUPTURE OF TYMPANIC MEMBRANE: Primary | ICD-10-CM

## 2025-05-22 PROCEDURE — 99203 OFFICE O/P NEW LOW 30 MIN: CPT | Performed by: NURSE PRACTITIONER

## 2025-05-22 RX ORDER — OFLOXACIN 3 MG/ML
10 SOLUTION AURICULAR (OTIC) 2 TIMES DAILY
Qty: 5 ML | Refills: 0 | Status: SHIPPED | OUTPATIENT
Start: 2025-05-22 | End: 2025-05-27

## 2025-05-22 RX ORDER — AMOXICILLIN 500 MG/1
500 CAPSULE ORAL EVERY 12 HOURS SCHEDULED
Qty: 20 CAPSULE | Refills: 0 | Status: SHIPPED | OUTPATIENT
Start: 2025-05-22 | End: 2025-06-01

## 2025-05-22 NOTE — PROGRESS NOTES
[facility]        NAME: Kamilah Ortega is a 24 y.o. female  : 2000    MRN: [id number]  DATE: May 22, 2025  TIME: 4:24 PM    Assessment and Plan   Non-recurrent acute suppurative otitis media of left ear without spontaneous rupture of tympanic membrane [H66.002]  1. Non-recurrent acute suppurative otitis media of left ear without spontaneous rupture of tympanic membrane  amoxicillin (AMOXIL) 500 mg capsule    ofloxacin (FLOXIN) 0.3 % otic solution            Patient Instructions   Amoxicillin twice a day for 10 days  Ofloxacin drops as directed  Continue to use Tylenol and Ibuprofen for fever control.   Tylenol is every 4 hours ibuprofen is every 6 hours.   Encourage fluid intake   Follow up with the PCP     Follow up with PCP in 3-5 days.  Proceed to  ER if symptoms worsen.    Chief Complaint     Chief Complaint   Patient presents with    Ear Fullness     Started 2 days ago. Left ear feels clogged, has gotten to the point where she cannot hear out of it. Tried hydroxide, but feels no ear wax. Reports no pain or fever nor sinus issues. Discomfort.          History of Present Illness       Patient is a 24-year-old female presenting with 2 days of left ear fullness.  She states her left ear has a clogged sensation.  She states that her ear canals are often itchy and she scratches them with her fingernails.  She did have a cough last week that has resolved.  No over-the-counter medication attempted.    Ear Fullness   Pertinent negatives include no coughing, ear discharge or sore throat.       Review of Systems   Review of Systems   Constitutional:  Negative for activity change, chills and fever.   HENT:  Positive for ear pain. Negative for congestion, ear discharge and sore throat.    Respiratory:  Negative for cough.          Current Medications     Current Medications[1]    Current Allergies     Allergies as of 2025 - Reviewed 2025   Allergen Reaction Noted    Fish-derived products -  food allergy  08/30/2017    Isoflavones (soy)  08/30/2017    Peanut oil - food allergy  08/30/2017            The following portions of the patient's history were reviewed and updated as appropriate: allergies, current medications, past family history, past medical history, past social history, past surgical history and problem list.     Past Medical History[2]    Past Surgical History[3]    Family History[4]      Medications have been verified.        Objective   /60 (BP Location: Left arm, Patient Position: Sitting, Cuff Size: Standard)   Pulse 92   Temp 97.7 °F (36.5 °C) (Tympanic)   Resp 20   SpO2 99%        Physical Exam     Physical Exam  Vitals reviewed.   Constitutional:       General: She is awake. She is not in acute distress.     Appearance: Normal appearance. She is normal weight.   HENT:      Head: Normocephalic.      Right Ear: Hearing normal.      Left Ear: Hearing normal.      Ears:      Comments: Left TM erythema with middle ear effusion.  Bilateral canal irritation.  No active drainage or swelling.     Nose: Nose normal.      Mouth/Throat:      Lips: Pink.      Pharynx: Oropharynx is clear.     Cardiovascular:      Rate and Rhythm: Normal rate.   Pulmonary:      Effort: Pulmonary effort is normal.     Skin:     General: Skin is warm and moist.     Neurological:      General: No focal deficit present.      Mental Status: She is alert and oriented to person, place, and time.     Psychiatric:         Behavior: Behavior is cooperative.                        [1]   Current Outpatient Medications:     amoxicillin (AMOXIL) 500 mg capsule, Take 1 capsule (500 mg total) by mouth every 12 (twelve) hours for 10 days, Disp: 20 capsule, Rfl: 0    ofloxacin (FLOXIN) 0.3 % otic solution, Administer 10 drops into both ears 2 (two) times a day for 5 days, Disp: 5 mL, Rfl: 0    albuterol (ProAir HFA) 90 mcg/act inhaler, Inhale 2 puffs every 6 (six) hours as needed for wheezing, Disp: 8 g, Rfl: 3     norethindrone-ethinyl estradiol (JUNEL FE 1/20) 1-20 MG-MCG per tablet, Take 1 tablet by mouth daily, Disp: 28 tablet, Rfl: 3  [2]   Past Medical History:  Diagnosis Date    Asthma     Depression    [3]   Past Surgical History:  Procedure Laterality Date    [place]  DELIVERY ONLY N/A 2021    Procedure:  SECTION ();  Surgeon: Sari SAPP MD;  Location: [facility];  Service: Obstetrics   [4]   Family History  Problem Relation Name Age of Onset    Diabetes Mother      Fibromyalgia Mother      Hypertension Mother      Anemia Mother      Hypertension Father      Asthma Sister      Schizophrenia Brother      Cancer Maternal Grandmother      Breast cancer Neg Hx      Colon cancer Neg Hx      Ovarian cancer Neg Hx

## 2025-05-22 NOTE — PATIENT INSTRUCTIONS
"Amoxicillin twice a day for 10 days  Ofloxacin drops as directed  Continue to use Tylenol and Ibuprofen for fever control.   Tylenol is every 4 hours ibuprofen is every 6 hours.   Encourage fluid intake   Follow up with the PCP     Patient Education     Ear infections in adults   The Basics   Written by the doctors and editors at [facility]   What is an ear infection? -- An ear infection is a condition that can cause pain in the ear, fever, and trouble hearing. Ear infections are more common in children than in adults.  Ear infections often occur after a cold or problem with seasonal allergies. Ear infections in adults happen more often in people who have a problem with their Eustachian tubes. The Eustachian tube connects the middle ear (the part of the ear behind the eardrum) to the back of the nose and throat.  Fluid can build up in the middle part of the ear behind the eardrum. This fluid can become infected and press on the eardrum, causing it to bulge (figure 1). This causes symptoms.  The medical term for middle ear infections is \"otitis media.\"  What are the symptoms of an ear infection? -- In adults, the symptoms include:   Ear pain   Temporary hearing loss   Feeling dizzy  How do I know if I have an ear infection? -- If you think that you have an ear infection, see a doctor or nurse. They will ask you about your symptoms, do an exam, and look in your ears.  How is an ear infection treated? -- Doctors treat ear infections in adults with antibiotics. These medicines kill the bacteria that cause some ear infections. Even though some ear infections are caused by a virus, doctors often prescribe antibiotics for adults anyway. That's because ear infections can lead to other problems if they are not treated quickly.  Is there anything I can do on my own to feel better?    Take all of your medicines as instructed. If the doctor prescribes antibiotics, finish all of them, even if you start to feel better.   You can " take non-prescription medicines to relieve pain. Examples include acetaminophen (sample brand name: Tylenol), ibuprofen (sample brand names: Advil, Motrin), or naproxen (sample brand name: Aleve).   You can try ice or heat to help with ear pain. Do not sleep with ice or heat on your ear.   Put a cold gel pack, bag of ice, or bag of frozen vegetables on the ear every 1 to 2 hours, for 15 minutes each time. Put a thin towel between the ice (or other cold object) and the skin.   Put a heating pad, warm towel, or hot water bottle on the ear every 1 to 2 hours, for 15 minutes at a time. Put a thin towel between the warm object and the skin.   Do not put anything in your ear unless the doctor or nurse told you to.   Airplane travel can make ear pain worse, especially as the plane starts to land. Chewing gum, drinking, or eating food might help.  Can ear infections be prevented? -- To lower your risk of getting an ear infection:   If you smoke, try to stop. Avoid places where others are smoking.   Wash your hands often.   Stay away from others who are sick with a cold or viral infection.   Get all of the vaccines your doctor recommends.  When should I call the doctor? -- Call for advice if:   Your symptoms are not getting better in 2 to 3 days.   You continue to have hearing problems after 2 to 3 weeks.   You have a fever of 100.4°F (38°C) or higher, or chills.   You have discharge or drainage coming from your ear.  All topics are updated as new evidence becomes available and our peer review process is complete.  This topic retrieved from [employer] on: May 15, 2024.  Topic 966203 Version 1.0  Release: 32.4.3 - C32.134  © 2024 UpToDate, Inc. and/or its affiliates. All rights reserved.  figure 1: Ear infection (otitis media)     The ear on the left is normal and does not have an infection. The ear on the right shows what an infection can look like. The infected fluid in the middle ear causes the eardrum to bulge. Normally,  "fluid in the middle ear drains into the throat through a tube called the \"Eustachian tube.\" But during an infection, swelling blocks off the tube, so fluid builds up.  Graphic 57238 Version 8.0  Consumer Information Use and Disclaimer   Disclaimer: This generalized information is a limited summary of diagnosis, treatment, and/or medication information. It is not meant to be comprehensive and should be used as a tool to help the user understand and/or assess potential diagnostic and treatment options. It does NOT include all information about conditions, treatments, medications, side effects, or risks that may apply to a specific patient. It is not intended to be medical advice or a substitute for the medical advice, diagnosis, or treatment of a health care provider based on the health care provider's examination and assessment of a patient's specific and unique circumstances. Patients must speak with a health care provider for complete information about their health, medical questions, and treatment options, including any risks or benefits regarding use of medications. This information does not endorse any treatments or medications as safe, effective, or approved for treating a specific patient. UpToDate, Inc. and its affiliates disclaim any warranty or liability relating to this information or the use thereof.The use of this information is governed by the Terms of Use, available at https://www.wolterskluwer.com/en/know/clinical-effectiveness-terms. 2024© UpToDate, Inc. and its affiliates and/or licensors. All rights reserved.  Copyright   © 2024 UpToDate, Inc. and/or its affiliates. All rights reserved.    "

## 2025-05-23 ENCOUNTER — TELEPHONE (OUTPATIENT)
Dept: PSYCHIATRY | Facility: CLINIC | Age: 25
End: 2025-05-23

## 2025-05-23 NOTE — TELEPHONE ENCOUNTER
Left voicemail informing patient and/or parent/guardian of the Psych Encounter form needing to be signed as a requirement from the insurance company for billing purposes. Patient can access form via [employer] and sign electronically.     Please make patient aware this form must be signed for each visit as a requirement to continue future visits with provider.

## 2025-05-27 ENCOUNTER — TELEPHONE (OUTPATIENT)
Dept: PSYCHIATRY | Facility: CLINIC | Age: 25
End: 2025-05-27

## 2025-05-27 NOTE — TELEPHONE ENCOUNTER
Left voicemail informing patient and/or parent/guardian of the Psych Encounter form needing to be signed as a requirement from the insurance company for billing purposes. Patient can access form via [employer] and sign electronically.     Please make patient aware this form must be signed for each visit as a requirement to continue future visits with provider.

## 2025-06-06 ENCOUNTER — TELEPHONE (OUTPATIENT)
Dept: PSYCHIATRY | Facility: CLINIC | Age: 25
End: 2025-06-06

## 2025-06-06 NOTE — TELEPHONE ENCOUNTER
NO-SHOW LETTER MAILED TO Kamilah Ortega.  ADDRESS: [address] [place] [phone]  SENT VIA [employer]

## 2025-06-06 NOTE — LETTER
25     Kamilah Ortega   : 2000   [address] [facility] [address]  [facility] [address]       It is the policy of [facility] to monitor and manage appointments that have been no-showed or cancelled with less than 48-hour notice. This is necessary to ensure that we are able to provide timely access for all patients to our providers. Undue numbers of unutilized appointments delays necessary medical care for all patients.      Dear Kamilah Ortega:      We are sorry that you missed your appointment with ALICIA Real on 2025. It has been 3 weeks since your last appointment. Your health and follow-up care are important to us. We want to make you aware of your next appointment with ALICIA Real that is scheduled for Thursday, 2025 at 4:00pm.    Please be aware that our office policy states that if you 'no show' two or more Therapy appointments without prior notice of cancellation within in a calendar year, you may be discharged from Therapy treatment.  We want to bring this to your attention now to prevent an interruption of your care.  If you have any questions about this policy, please call us at the number above.         Thank you in advance for your cooperation and assistance.       Sincerely,      [facility] Support Staff

## 2025-06-09 ENCOUNTER — DOCUMENTATION (OUTPATIENT)
Dept: BEHAVIORAL/MENTAL HEALTH CLINIC | Facility: CLINIC | Age: 25
End: 2025-06-09

## 2025-06-09 ENCOUNTER — TELEPHONE (OUTPATIENT)
Dept: PSYCHIATRY | Facility: CLINIC | Age: 25
End: 2025-06-09

## 2025-06-09 ENCOUNTER — TELEPHONE (OUTPATIENT)
Age: 25
End: 2025-06-09

## 2025-06-09 NOTE — TELEPHONE ENCOUNTER
The patient called to request the soonest appointment with the provider, for an emergency session. The writer was able to offer, and schedule the following appointment:  6.11.25 @ 12:00pm-virtually    The patient does not have a virtual consent form signed on file. The form has been sent to the patient via My Chart, to complete prior to the appointment.     The patient acknowledged the above.

## 2025-06-09 NOTE — PROGRESS NOTES
Psychotherapy Discharge Summary    Preferred Name: Kamilah Ortega  YOB: 2000    Admission date to psychotherapy: 11/09/23    Referred by: Self Referred    Presenting Problem: Relationship difficulty    Course of treatment included : individual therapy     Progress/Outcome of Treatment Goals (brief summary of course of treatment) Began seeking help  regarding relationship difficulty as well as anger management  in relationship with communication difficulty with spouse. Made some progress with expanding on some of her personal career related goals  and reducing angry interactions.interactions.    Treatment Complications (if any): attendance    Treatment Progress: good    Current [facility] Psychiatric Provider: NA    Discharge Medications include: NA    Discharge Date: 6/9/25    Discharge Diagnosis: Adjustment disorder with disturbance of conduct     Criteria for Discharge: two or more unexcused absences for services    Patient is cleared to return to ALICIA Real for continued treatment.    Rationale: CHRIS    Aftercare recommendations include (include specific referral names and phone numbers, if appropriate): Reengage in therapy as needed    Prognosis: good

## 2025-06-09 NOTE — LETTER
06/09/25       Kamilah Ortega   [address] [facility] [address]  [place] [address]       Dear Kamilah Ortega:    It has been our pleasure to serve your needs. Since you are no longer interested in continuing your treatment with ALICIA Real at [facility], your chart is being closed at this time.    If you wish to return to [facility], you will need to have another initial assessment and intake appointment. Please call [phone] to schedule a new psychiatric intake if you are interested in doing so.      Please follow-up with your primary care provider for continual care.  When you have scheduled an appointment with another agency, please feel free to complete a release of information so that your records can be transferred to your new provider prior to your first appointment.  This will aid with the continuity of your care.      Sincerely,           ALICIA Real     [facility]        We will continue to provide psychotropic medications and/or emergency counseling as deemed appropriate by clinical staff for 45 days from receipt of this letter.                For a referral to another agency, we would suggest that you contact your primary health care provider or insurance company.   Please see Provider's List of agencies below:    Outpatient Mental Health Adult  [facility].  [address]  [place].  [phone]   Loi Manzo MD to [address] [place] [address].  Melita ROMAN. [phone]   [address] [place]. [phone]   Morenita Silveira MD [address] [place], [place] [address]Pa. [phone]   Jacobo Trujillo MD, [address] , [place]. [phone]   Outpatient Mental Health Children, Adolescents and Family  [facility] [address]: [address] [place] [address] [phone]  [facility] [address]  [place] [address]  and [place] [id number], [id number]    [phone]  [facility] (14 and over)  [address] JESSIE Mccormack  [phone]  Outpatient Mental Health Georgian Speaking  [facility] [address] [place], [place] [id number] [phone]  [facility]:   [place] [address] Phone: [phone]  [facility] Outpatient:Wilkinson Yamilex, [address] [place] [address] New Admissions [phone] Local [phone]  [facility]:   [facility] :[address] [place], [place] [address] Phone: [phone]  [facility] Outpatient: [address] [place] [address] Phone: New Admissions [phone] / Local Office [phone]  Drug & Alcohol Services:  [facility]:   [phone] or [phone]  [facility]:  [phone] or [phone]  [facility]:  [phone]  [facility]:  [phone]  [facility]: [phone]    [facility]:   [facility]:  [address] [place] [address]  Phone: [phone]  [facility] CRISIS NUMBERS:    [employer]:  [phone]    [place]: [phone] or [phone]    [place] / [place]: [phone]    [place]: [phone]    [place]: [phone]    [place]: [phone] ([phone])    [place]: [phone]    National Suicide Prevention Hotline:  1-855.762.2339 (TALK)

## 2025-06-10 NOTE — TELEPHONE ENCOUNTER
Called PT to inform that TT provider DC the PT before the PT called to schedule a f/u. Writer informed a message would be sent to our intake dept to be placed back on the wait list for another provider. PT understood

## 2025-06-10 NOTE — TELEPHONE ENCOUNTER
DISCHARGE LETTER for ALICIA Real (certified and regular) placed in outgoing mail on 06/10/25.    Article #:  [id number]    Address:  [address] [place] [phone]

## 2025-06-18 ENCOUNTER — TELEPHONE (OUTPATIENT)
Dept: OTHER | Facility: OTHER | Age: 25
End: 2025-06-18

## 2025-06-19 NOTE — TELEPHONE ENCOUNTER
Outreach call placed to advise pt she is currently on the wait list for therapy services. No availability at this time, intake will reach out to schedule once an appt becomes available. No answer; LVM requesting to return call, [phone].      Note: Pt D/C'd from [facility] on 6/9/25; letter sent. Pt advised of D/C and wait list during a previous call on 6/10.

## 2025-06-19 NOTE — TELEPHONE ENCOUNTER
Patient returned phone call stating they are going through a lot right now and are in need to therapy ASAP   Prescription sent.  If she is tolerating this after a month or 2 would recommend follow-up appointment so that we could consider increasing dosage further.

## 2025-07-23 ENCOUNTER — ANNUAL EXAM (OUTPATIENT)
Dept: OBGYN CLINIC | Facility: CLINIC | Age: 25
End: 2025-07-23

## 2025-07-23 DIAGNOSIS — Z53.21 PATIENT LEFT WITHOUT BEING SEEN: Primary | ICD-10-CM

## 2025-07-23 PROCEDURE — NOSHOW: Performed by: NURSE PRACTITIONER

## 2025-07-31 ENCOUNTER — ANNUAL EXAM (OUTPATIENT)
Dept: OBGYN CLINIC | Facility: CLINIC | Age: 25
End: 2025-07-31

## 2025-07-31 VITALS — BODY MASS INDEX: 23.47 KG/M2 | SYSTOLIC BLOOD PRESSURE: 100 MMHG | WEIGHT: 124.2 LBS | DIASTOLIC BLOOD PRESSURE: 72 MMHG

## 2025-07-31 DIAGNOSIS — Z12.39 ENCOUNTER FOR BREAST CANCER SCREENING USING NON-MAMMOGRAM MODALITY: ICD-10-CM

## 2025-07-31 DIAGNOSIS — Z01.419 ENCOUNTER FOR GYNECOLOGICAL EXAMINATION WITHOUT ABNORMAL FINDING: Primary | ICD-10-CM

## 2025-07-31 DIAGNOSIS — Z12.4 SCREENING FOR CERVICAL CANCER: ICD-10-CM

## 2025-07-31 DIAGNOSIS — Z11.3 SCREEN FOR STD (SEXUALLY TRANSMITTED DISEASE): ICD-10-CM

## 2025-07-31 PROCEDURE — 87661 TRICHOMONAS VAGINALIS AMPLIF: CPT | Performed by: NURSE PRACTITIONER

## 2025-07-31 PROCEDURE — 87591 N.GONORRHOEAE DNA AMP PROB: CPT | Performed by: NURSE PRACTITIONER

## 2025-07-31 PROCEDURE — 99395 PREV VISIT EST AGE 18-39: CPT | Performed by: NURSE PRACTITIONER

## 2025-07-31 PROCEDURE — 87491 CHLMYD TRACH DNA AMP PROBE: CPT | Performed by: NURSE PRACTITIONER

## 2025-07-31 PROCEDURE — 87563 M. GENITALIUM AMP PROBE: CPT | Performed by: NURSE PRACTITIONER

## 2025-08-01 LAB
C TRACH DNA SPEC QL NAA+PROBE: NEGATIVE
N GONORRHOEA DNA SPEC QL NAA+PROBE: NEGATIVE

## 2025-08-02 LAB
M GENITALIUM DNA SPEC QL NAA+PROBE: NEGATIVE
T VAGINALIS DNA SPEC QL NAA+PROBE: NEGATIVE

## 2025-08-22 ENCOUNTER — TELEPHONE (OUTPATIENT)
Age: 25
End: 2025-08-22